# Patient Record
Sex: MALE | HISPANIC OR LATINO | Employment: PART TIME | URBAN - METROPOLITAN AREA
[De-identification: names, ages, dates, MRNs, and addresses within clinical notes are randomized per-mention and may not be internally consistent; named-entity substitution may affect disease eponyms.]

---

## 2018-01-01 ENCOUNTER — APPOINTMENT (OUTPATIENT)
Dept: RADIOLOGY | Facility: MEDICAL CENTER | Age: 78
DRG: 981 | End: 2018-01-01
Attending: INTERNAL MEDICINE
Payer: MEDICARE

## 2018-01-01 ENCOUNTER — RESOLUTE PROFESSIONAL BILLING HOSPITAL PROF FEE (OUTPATIENT)
Dept: HOSPITALIST | Facility: MEDICAL CENTER | Age: 78
End: 2018-01-01
Payer: MEDICARE

## 2018-01-01 ENCOUNTER — APPOINTMENT (OUTPATIENT)
Dept: RADIOLOGY | Facility: MEDICAL CENTER | Age: 78
DRG: 981 | End: 2018-01-01
Attending: EMERGENCY MEDICINE
Payer: MEDICARE

## 2018-01-01 ENCOUNTER — HOSPITAL ENCOUNTER (INPATIENT)
Facility: MEDICAL CENTER | Age: 78
LOS: 17 days | DRG: 981 | End: 2018-05-07
Attending: EMERGENCY MEDICINE | Admitting: INTERNAL MEDICINE
Payer: MEDICARE

## 2018-01-01 ENCOUNTER — APPOINTMENT (OUTPATIENT)
Dept: RADIOLOGY | Facility: MEDICAL CENTER | Age: 78
DRG: 981 | End: 2018-01-01
Attending: HOSPITALIST
Payer: MEDICARE

## 2018-01-01 VITALS
DIASTOLIC BLOOD PRESSURE: 69 MMHG | SYSTOLIC BLOOD PRESSURE: 146 MMHG | HEIGHT: 69 IN | HEART RATE: 77 BPM | WEIGHT: 181 LBS | BODY MASS INDEX: 26.81 KG/M2 | RESPIRATION RATE: 22 BRPM | OXYGEN SATURATION: 95 % | TEMPERATURE: 98.6 F

## 2018-01-01 DIAGNOSIS — J96.01 ACUTE RESPIRATORY FAILURE WITH HYPOXIA (HCC): ICD-10-CM

## 2018-01-01 DIAGNOSIS — T68.XXXA HYPOTHERMIA, INITIAL ENCOUNTER: ICD-10-CM

## 2018-01-01 DIAGNOSIS — E87.5 HYPERKALEMIA: ICD-10-CM

## 2018-01-01 DIAGNOSIS — R53.1 WEAKNESS: ICD-10-CM

## 2018-01-01 DIAGNOSIS — J96.01 ACUTE HYPOXEMIC RESPIRATORY FAILURE (HCC): ICD-10-CM

## 2018-01-01 DIAGNOSIS — N17.9 ACUTE KIDNEY INJURY (HCC): ICD-10-CM

## 2018-01-01 DIAGNOSIS — D64.9 ANEMIA, UNSPECIFIED TYPE: ICD-10-CM

## 2018-01-01 DIAGNOSIS — R13.10 DYSPHAGIA, UNSPECIFIED TYPE: ICD-10-CM

## 2018-01-01 DIAGNOSIS — G93.40 ENCEPHALOPATHY: ICD-10-CM

## 2018-01-01 LAB
ABO GROUP BLD: NORMAL
ACTION RANGE TRIGGERED IACRT: NO
ACTION RANGE TRIGGERED IACRT: YES
ACTION RANGE TRIGGERED IACRT: YES
ALBUMIN SERPL BCP-MCNC: 2.5 G/DL (ref 3.2–4.9)
ALBUMIN SERPL BCP-MCNC: 2.7 G/DL (ref 3.2–4.9)
ALBUMIN SERPL BCP-MCNC: 2.8 G/DL (ref 3.2–4.9)
ALBUMIN SERPL BCP-MCNC: 2.9 G/DL (ref 3.2–4.9)
ALBUMIN SERPL BCP-MCNC: 3 G/DL (ref 3.2–4.9)
ALBUMIN SERPL BCP-MCNC: 3.1 G/DL (ref 3.2–4.9)
ALBUMIN SERPL BCP-MCNC: 3.1 G/DL (ref 3.2–4.9)
ALBUMIN/GLOB SERPL: 0.8 G/DL
ALBUMIN/GLOB SERPL: 0.8 G/DL
ALBUMIN/GLOB SERPL: 0.9 G/DL
ALBUMIN/GLOB SERPL: 1 G/DL
ALBUMIN/GLOB SERPL: 1.1 G/DL
ALBUMIN/GLOB SERPL: 1.1 G/DL
ALBUMIN/GLOB SERPL: 1.2 G/DL
ALP SERPL-CCNC: 138 U/L (ref 30–99)
ALP SERPL-CCNC: 145 U/L (ref 30–99)
ALP SERPL-CCNC: 184 U/L (ref 30–99)
ALP SERPL-CCNC: 201 U/L (ref 30–99)
ALP SERPL-CCNC: 363 U/L (ref 30–99)
ALP SERPL-CCNC: 429 U/L (ref 30–99)
ALP SERPL-CCNC: 589 U/L (ref 30–99)
ALT SERPL-CCNC: 17 U/L (ref 2–50)
ALT SERPL-CCNC: 19 U/L (ref 2–50)
ALT SERPL-CCNC: 21 U/L (ref 2–50)
ALT SERPL-CCNC: 35 U/L (ref 2–50)
ALT SERPL-CCNC: 37 U/L (ref 2–50)
ALT SERPL-CCNC: 52 U/L (ref 2–50)
ALT SERPL-CCNC: 53 U/L (ref 2–50)
AMMONIA PLAS-SCNC: 29 UMOL/L (ref 11–45)
ANION GAP SERPL CALC-SCNC: 10 MMOL/L (ref 0–11.9)
ANION GAP SERPL CALC-SCNC: 11 MMOL/L (ref 0–11.9)
ANION GAP SERPL CALC-SCNC: 11 MMOL/L (ref 0–11.9)
ANION GAP SERPL CALC-SCNC: 12 MMOL/L (ref 0–11.9)
ANION GAP SERPL CALC-SCNC: 14 MMOL/L (ref 0–11.9)
ANION GAP SERPL CALC-SCNC: 6 MMOL/L (ref 0–11.9)
ANION GAP SERPL CALC-SCNC: 6 MMOL/L (ref 0–11.9)
ANION GAP SERPL CALC-SCNC: 7 MMOL/L (ref 0–11.9)
ANION GAP SERPL CALC-SCNC: 8 MMOL/L (ref 0–11.9)
ANION GAP SERPL CALC-SCNC: 9 MMOL/L (ref 0–11.9)
ANION GAP SERPL CALC-SCNC: 9 MMOL/L (ref 0–11.9)
APTT PPP: 214.5 SEC (ref 24.7–36)
APTT PPP: >240 SEC (ref 24.7–36)
AST SERPL-CCNC: 102 U/L (ref 12–45)
AST SERPL-CCNC: 23 U/L (ref 12–45)
AST SERPL-CCNC: 30 U/L (ref 12–45)
AST SERPL-CCNC: 34 U/L (ref 12–45)
AST SERPL-CCNC: 35 U/L (ref 12–45)
AST SERPL-CCNC: 35 U/L (ref 12–45)
AST SERPL-CCNC: 62 U/L (ref 12–45)
BACTERIA BLD CULT: NORMAL
BACTERIA BLD CULT: NORMAL
BACTERIA SPEC RESP CULT: ABNORMAL
BARCODED ABORH UBTYP: 7300
BARCODED PRD CODE UBPRD: NORMAL
BARCODED UNIT NUM UBUNT: NORMAL
BASE EXCESS BLDA CALC-SCNC: -10 MMOL/L (ref -4–3)
BASE EXCESS BLDA CALC-SCNC: -11 MMOL/L (ref -4–3)
BASE EXCESS BLDA CALC-SCNC: -11 MMOL/L (ref -4–3)
BASE EXCESS BLDA CALC-SCNC: -15 MMOL/L (ref -4–3)
BASE EXCESS BLDA CALC-SCNC: 1 MMOL/L (ref -4–3)
BASE EXCESS BLDA CALC-SCNC: 4 MMOL/L (ref -4–3)
BASE EXCESS BLDA CALC-SCNC: 6 MMOL/L (ref -4–3)
BASOPHILS # BLD AUTO: 0 % (ref 0–1.8)
BASOPHILS # BLD AUTO: 0.1 % (ref 0–1.8)
BASOPHILS # BLD AUTO: 0.2 % (ref 0–1.8)
BASOPHILS # BLD AUTO: 0.3 % (ref 0–1.8)
BASOPHILS # BLD AUTO: 0.4 % (ref 0–1.8)
BASOPHILS # BLD AUTO: 0.6 % (ref 0–1.8)
BASOPHILS # BLD AUTO: 0.8 % (ref 0–1.8)
BASOPHILS # BLD: 0 K/UL (ref 0–0.12)
BASOPHILS # BLD: 0.01 K/UL (ref 0–0.12)
BASOPHILS # BLD: 0.01 K/UL (ref 0–0.12)
BASOPHILS # BLD: 0.02 K/UL (ref 0–0.12)
BASOPHILS # BLD: 0.03 K/UL (ref 0–0.12)
BASOPHILS # BLD: 0.03 K/UL (ref 0–0.12)
BASOPHILS # BLD: 0.04 K/UL (ref 0–0.12)
BASOPHILS # BLD: 0.06 K/UL (ref 0–0.12)
BASOPHILS # BLD: 0.09 K/UL (ref 0–0.12)
BILIRUB SERPL-MCNC: 0.3 MG/DL (ref 0.1–1.5)
BILIRUB SERPL-MCNC: 0.4 MG/DL (ref 0.1–1.5)
BILIRUB SERPL-MCNC: 0.6 MG/DL (ref 0.1–1.5)
BILIRUB SERPL-MCNC: 0.7 MG/DL (ref 0.1–1.5)
BILIRUB SERPL-MCNC: 0.7 MG/DL (ref 0.1–1.5)
BLD GP AB SCN SERPL QL: NORMAL
BODY TEMPERATURE: ABNORMAL CENTIGRADE
BODY TEMPERATURE: ABNORMAL CENTIGRADE
BODY TEMPERATURE: ABNORMAL DEGREES
BUN SERPL-MCNC: 26 MG/DL (ref 8–22)
BUN SERPL-MCNC: 28 MG/DL (ref 8–22)
BUN SERPL-MCNC: 33 MG/DL (ref 8–22)
BUN SERPL-MCNC: 37 MG/DL (ref 8–22)
BUN SERPL-MCNC: 38 MG/DL (ref 8–22)
BUN SERPL-MCNC: 40 MG/DL (ref 8–22)
BUN SERPL-MCNC: 41 MG/DL (ref 8–22)
BUN SERPL-MCNC: 43 MG/DL (ref 8–22)
BUN SERPL-MCNC: 45 MG/DL (ref 8–22)
BUN SERPL-MCNC: 46 MG/DL (ref 8–22)
BUN SERPL-MCNC: 50 MG/DL (ref 8–22)
BUN SERPL-MCNC: 53 MG/DL (ref 8–22)
BUN SERPL-MCNC: 54 MG/DL (ref 8–22)
BUN SERPL-MCNC: 57 MG/DL (ref 8–22)
BUN SERPL-MCNC: 58 MG/DL (ref 8–22)
BUN SERPL-MCNC: 63 MG/DL (ref 8–22)
BUN SERPL-MCNC: 65 MG/DL (ref 8–22)
CALCIUM SERPL-MCNC: 7.8 MG/DL (ref 8.5–10.5)
CALCIUM SERPL-MCNC: 8 MG/DL (ref 8.5–10.5)
CALCIUM SERPL-MCNC: 8 MG/DL (ref 8.5–10.5)
CALCIUM SERPL-MCNC: 8.2 MG/DL (ref 8.5–10.5)
CALCIUM SERPL-MCNC: 8.4 MG/DL (ref 8.5–10.5)
CALCIUM SERPL-MCNC: 8.5 MG/DL (ref 8.5–10.5)
CALCIUM SERPL-MCNC: 8.5 MG/DL (ref 8.5–10.5)
CALCIUM SERPL-MCNC: 8.7 MG/DL (ref 8.5–10.5)
CALCIUM SERPL-MCNC: 8.7 MG/DL (ref 8.5–10.5)
CALCIUM SERPL-MCNC: 8.8 MG/DL (ref 8.5–10.5)
CALCIUM SERPL-MCNC: 9 MG/DL (ref 8.5–10.5)
CALCIUM SERPL-MCNC: 9.1 MG/DL (ref 8.5–10.5)
CFT BLD TEG: 33.2 MIN (ref 5–10)
CFT BLD TEG: 8.7 MIN (ref 5–10)
CFT P HPASE BLD TEG: 13.9 MIN (ref 5–10)
CHLORIDE SERPL-SCNC: 101 MMOL/L (ref 96–112)
CHLORIDE SERPL-SCNC: 103 MMOL/L (ref 96–112)
CHLORIDE SERPL-SCNC: 104 MMOL/L (ref 96–112)
CHLORIDE SERPL-SCNC: 105 MMOL/L (ref 96–112)
CHLORIDE SERPL-SCNC: 106 MMOL/L (ref 96–112)
CHLORIDE SERPL-SCNC: 106 MMOL/L (ref 96–112)
CHLORIDE SERPL-SCNC: 108 MMOL/L (ref 96–112)
CHLORIDE SERPL-SCNC: 108 MMOL/L (ref 96–112)
CHLORIDE SERPL-SCNC: 110 MMOL/L (ref 96–112)
CHLORIDE SERPL-SCNC: 110 MMOL/L (ref 96–112)
CHLORIDE SERPL-SCNC: 112 MMOL/L (ref 96–112)
CHLORIDE SERPL-SCNC: 97 MMOL/L (ref 96–112)
CHLORIDE SERPL-SCNC: 99 MMOL/L (ref 96–112)
CK SERPL-CCNC: 223 U/L (ref 0–154)
CLOT ANGLE BLD TEG: 28.8 DEGREES (ref 53–72)
CLOT ANGLE BLD TEG: 71.2 DEGREES (ref 53–72)
CLOT ANGLE P HPASE BLD TEG: 69.6 DEGREES (ref 53–72)
CLOT INIT P HPASE BLD TEG: 1.7 MIN (ref 1–3)
CLOT LYSIS 30M P MA LENFR BLD TEG: 0 % (ref 0–8)
CLOT LYSIS 30M P MA LENFR BLD TEG: 0 % (ref 0–8)
CLOT LYSIS 30M P MA LENFR BLD TEG: 3.2 % (ref 0–8)
CO2 BLDA-SCNC: 14 MMOL/L (ref 20–33)
CO2 BLDA-SCNC: 17 MMOL/L (ref 20–33)
CO2 BLDA-SCNC: 17 MMOL/L (ref 20–33)
CO2 BLDA-SCNC: 23 MMOL/L (ref 20–33)
CO2 BLDA-SCNC: 28 MMOL/L (ref 20–33)
CO2 BLDA-SCNC: 30 MMOL/L (ref 20–33)
CO2 BLDA-SCNC: 30 MMOL/L (ref 20–33)
CO2 SERPL-SCNC: 13 MMOL/L (ref 20–33)
CO2 SERPL-SCNC: 14 MMOL/L (ref 20–33)
CO2 SERPL-SCNC: 23 MMOL/L (ref 20–33)
CO2 SERPL-SCNC: 25 MMOL/L (ref 20–33)
CO2 SERPL-SCNC: 25 MMOL/L (ref 20–33)
CO2 SERPL-SCNC: 28 MMOL/L (ref 20–33)
CO2 SERPL-SCNC: 29 MMOL/L (ref 20–33)
CO2 SERPL-SCNC: 31 MMOL/L (ref 20–33)
CO2 SERPL-SCNC: 33 MMOL/L (ref 20–33)
COMPONENT FT 8504FT: NORMAL
COMPONENT FT 8504FT: NORMAL
COMPONENT R 8504R: NORMAL
COMPONENT R 8504R: NORMAL
CORTIS SERPL-MCNC: 60.4 UG/DL (ref 0–23)
CREAT SERPL-MCNC: 1.74 MG/DL (ref 0.5–1.4)
CREAT SERPL-MCNC: 1.76 MG/DL (ref 0.5–1.4)
CREAT SERPL-MCNC: 1.83 MG/DL (ref 0.5–1.4)
CREAT SERPL-MCNC: 2.03 MG/DL (ref 0.5–1.4)
CREAT SERPL-MCNC: 2.09 MG/DL (ref 0.5–1.4)
CREAT SERPL-MCNC: 2.1 MG/DL (ref 0.5–1.4)
CREAT SERPL-MCNC: 2.11 MG/DL (ref 0.5–1.4)
CREAT SERPL-MCNC: 2.26 MG/DL (ref 0.5–1.4)
CREAT SERPL-MCNC: 2.29 MG/DL (ref 0.5–1.4)
CREAT SERPL-MCNC: 2.38 MG/DL (ref 0.5–1.4)
CREAT SERPL-MCNC: 2.54 MG/DL (ref 0.5–1.4)
CREAT SERPL-MCNC: 2.6 MG/DL (ref 0.5–1.4)
CREAT SERPL-MCNC: 2.75 MG/DL (ref 0.5–1.4)
CREAT SERPL-MCNC: 2.78 MG/DL (ref 0.5–1.4)
CREAT SERPL-MCNC: 2.82 MG/DL (ref 0.5–1.4)
CREAT SERPL-MCNC: 2.88 MG/DL (ref 0.5–1.4)
CREAT SERPL-MCNC: 3.01 MG/DL (ref 0.5–1.4)
CREAT SERPL-MCNC: 3.18 MG/DL (ref 0.5–1.4)
CREAT SERPL-MCNC: 3.28 MG/DL (ref 0.5–1.4)
CREAT UR-MCNC: 83.1 MG/DL
CRP SERPL HS-MCNC: 14.98 MG/DL (ref 0–0.75)
CRP SERPL HS-MCNC: 8.3 MG/DL (ref 0–0.75)
CT.EXTRINSIC BLD ROTEM: 1.4 MIN (ref 1–3)
CT.EXTRINSIC BLD ROTEM: 9.9 MIN (ref 1–3)
EKG IMPRESSION: NORMAL
EKG IMPRESSION: NORMAL
EOSINOPHIL # BLD AUTO: 0 K/UL (ref 0–0.51)
EOSINOPHIL # BLD AUTO: 0 K/UL (ref 0–0.51)
EOSINOPHIL # BLD AUTO: 0.01 K/UL (ref 0–0.51)
EOSINOPHIL # BLD AUTO: 0.02 K/UL (ref 0–0.51)
EOSINOPHIL # BLD AUTO: 0.03 K/UL (ref 0–0.51)
EOSINOPHIL # BLD AUTO: 0.05 K/UL (ref 0–0.51)
EOSINOPHIL # BLD AUTO: 0.09 K/UL (ref 0–0.51)
EOSINOPHIL # BLD AUTO: 0.09 K/UL (ref 0–0.51)
EOSINOPHIL # BLD AUTO: 0.14 K/UL (ref 0–0.51)
EOSINOPHIL # BLD AUTO: 0.19 K/UL (ref 0–0.51)
EOSINOPHIL # BLD AUTO: 0.24 K/UL (ref 0–0.51)
EOSINOPHIL NFR BLD: 0 % (ref 0–6.9)
EOSINOPHIL NFR BLD: 0 % (ref 0–6.9)
EOSINOPHIL NFR BLD: 0.1 % (ref 0–6.9)
EOSINOPHIL NFR BLD: 0.2 % (ref 0–6.9)
EOSINOPHIL NFR BLD: 0.2 % (ref 0–6.9)
EOSINOPHIL NFR BLD: 0.5 % (ref 0–6.9)
EOSINOPHIL NFR BLD: 0.8 % (ref 0–6.9)
EOSINOPHIL NFR BLD: 1.1 % (ref 0–6.9)
EOSINOPHIL NFR BLD: 1.6 % (ref 0–6.9)
EOSINOPHIL NFR BLD: 1.8 % (ref 0–6.9)
EOSINOPHIL NFR BLD: 2.1 % (ref 0–6.9)
ERYTHROCYTE [DISTWIDTH] IN BLOOD BY AUTOMATED COUNT: 54.9 FL (ref 35.9–50)
ERYTHROCYTE [DISTWIDTH] IN BLOOD BY AUTOMATED COUNT: 55.4 FL (ref 35.9–50)
ERYTHROCYTE [DISTWIDTH] IN BLOOD BY AUTOMATED COUNT: 55.4 FL (ref 35.9–50)
ERYTHROCYTE [DISTWIDTH] IN BLOOD BY AUTOMATED COUNT: 55.5 FL (ref 35.9–50)
ERYTHROCYTE [DISTWIDTH] IN BLOOD BY AUTOMATED COUNT: 56.7 FL (ref 35.9–50)
ERYTHROCYTE [DISTWIDTH] IN BLOOD BY AUTOMATED COUNT: 57.1 FL (ref 35.9–50)
ERYTHROCYTE [DISTWIDTH] IN BLOOD BY AUTOMATED COUNT: 57.9 FL (ref 35.9–50)
ERYTHROCYTE [DISTWIDTH] IN BLOOD BY AUTOMATED COUNT: 58 FL (ref 35.9–50)
ERYTHROCYTE [DISTWIDTH] IN BLOOD BY AUTOMATED COUNT: 59 FL (ref 35.9–50)
ERYTHROCYTE [DISTWIDTH] IN BLOOD BY AUTOMATED COUNT: 59.7 FL (ref 35.9–50)
ERYTHROCYTE [DISTWIDTH] IN BLOOD BY AUTOMATED COUNT: 60.3 FL (ref 35.9–50)
ERYTHROCYTE [DISTWIDTH] IN BLOOD BY AUTOMATED COUNT: 60.7 FL (ref 35.9–50)
ERYTHROCYTE [DISTWIDTH] IN BLOOD BY AUTOMATED COUNT: 61.5 FL (ref 35.9–50)
ERYTHROCYTE [DISTWIDTH] IN BLOOD BY AUTOMATED COUNT: 61.6 FL (ref 35.9–50)
ERYTHROCYTE [DISTWIDTH] IN BLOOD BY AUTOMATED COUNT: 61.9 FL (ref 35.9–50)
EST. AVERAGE GLUCOSE BLD GHB EST-MCNC: 151 MG/DL
FERRITIN SERPL-MCNC: 127.3 NG/ML (ref 22–322)
FOLATE SERPL-MCNC: >23.5 NG/ML
GGT SERPL-CCNC: 494 U/L (ref 7–51)
GLOBULIN SER CALC-MCNC: 2.1 G/DL (ref 1.9–3.5)
GLOBULIN SER CALC-MCNC: 2.7 G/DL (ref 1.9–3.5)
GLOBULIN SER CALC-MCNC: 2.7 G/DL (ref 1.9–3.5)
GLOBULIN SER CALC-MCNC: 3.1 G/DL (ref 1.9–3.5)
GLOBULIN SER CALC-MCNC: 3.2 G/DL (ref 1.9–3.5)
GLOBULIN SER CALC-MCNC: 3.2 G/DL (ref 1.9–3.5)
GLOBULIN SER CALC-MCNC: 3.7 G/DL (ref 1.9–3.5)
GLUCOSE BLD-MCNC: 100 MG/DL (ref 65–99)
GLUCOSE BLD-MCNC: 103 MG/DL (ref 65–99)
GLUCOSE BLD-MCNC: 109 MG/DL (ref 65–99)
GLUCOSE BLD-MCNC: 110 MG/DL (ref 65–99)
GLUCOSE BLD-MCNC: 113 MG/DL (ref 65–99)
GLUCOSE BLD-MCNC: 118 MG/DL (ref 65–99)
GLUCOSE BLD-MCNC: 119 MG/DL (ref 65–99)
GLUCOSE BLD-MCNC: 120 MG/DL (ref 65–99)
GLUCOSE BLD-MCNC: 120 MG/DL (ref 65–99)
GLUCOSE BLD-MCNC: 121 MG/DL (ref 65–99)
GLUCOSE BLD-MCNC: 121 MG/DL (ref 65–99)
GLUCOSE BLD-MCNC: 124 MG/DL (ref 65–99)
GLUCOSE BLD-MCNC: 126 MG/DL (ref 65–99)
GLUCOSE BLD-MCNC: 127 MG/DL (ref 65–99)
GLUCOSE BLD-MCNC: 128 MG/DL (ref 65–99)
GLUCOSE BLD-MCNC: 130 MG/DL (ref 65–99)
GLUCOSE BLD-MCNC: 130 MG/DL (ref 65–99)
GLUCOSE BLD-MCNC: 131 MG/DL (ref 65–99)
GLUCOSE BLD-MCNC: 132 MG/DL (ref 65–99)
GLUCOSE BLD-MCNC: 136 MG/DL (ref 65–99)
GLUCOSE BLD-MCNC: 137 MG/DL (ref 65–99)
GLUCOSE BLD-MCNC: 139 MG/DL (ref 65–99)
GLUCOSE BLD-MCNC: 147 MG/DL (ref 65–99)
GLUCOSE BLD-MCNC: 149 MG/DL (ref 65–99)
GLUCOSE BLD-MCNC: 149 MG/DL (ref 65–99)
GLUCOSE BLD-MCNC: 151 MG/DL (ref 65–99)
GLUCOSE BLD-MCNC: 153 MG/DL (ref 65–99)
GLUCOSE BLD-MCNC: 157 MG/DL (ref 65–99)
GLUCOSE BLD-MCNC: 158 MG/DL (ref 65–99)
GLUCOSE BLD-MCNC: 158 MG/DL (ref 65–99)
GLUCOSE BLD-MCNC: 166 MG/DL (ref 65–99)
GLUCOSE BLD-MCNC: 168 MG/DL (ref 65–99)
GLUCOSE BLD-MCNC: 170 MG/DL (ref 65–99)
GLUCOSE BLD-MCNC: 171 MG/DL (ref 65–99)
GLUCOSE BLD-MCNC: 175 MG/DL (ref 65–99)
GLUCOSE BLD-MCNC: 177 MG/DL (ref 65–99)
GLUCOSE BLD-MCNC: 178 MG/DL (ref 65–99)
GLUCOSE BLD-MCNC: 181 MG/DL (ref 65–99)
GLUCOSE BLD-MCNC: 183 MG/DL (ref 65–99)
GLUCOSE BLD-MCNC: 184 MG/DL (ref 65–99)
GLUCOSE BLD-MCNC: 191 MG/DL (ref 65–99)
GLUCOSE BLD-MCNC: 196 MG/DL (ref 65–99)
GLUCOSE BLD-MCNC: 199 MG/DL (ref 65–99)
GLUCOSE BLD-MCNC: 200 MG/DL (ref 65–99)
GLUCOSE BLD-MCNC: 204 MG/DL (ref 65–99)
GLUCOSE BLD-MCNC: 218 MG/DL (ref 65–99)
GLUCOSE BLD-MCNC: 223 MG/DL (ref 65–99)
GLUCOSE BLD-MCNC: 223 MG/DL (ref 65–99)
GLUCOSE BLD-MCNC: 229 MG/DL (ref 65–99)
GLUCOSE BLD-MCNC: 240 MG/DL (ref 65–99)
GLUCOSE BLD-MCNC: 251 MG/DL (ref 65–99)
GLUCOSE BLD-MCNC: 254 MG/DL (ref 65–99)
GLUCOSE BLD-MCNC: 293 MG/DL (ref 65–99)
GLUCOSE BLD-MCNC: 306 MG/DL (ref 65–99)
GLUCOSE BLD-MCNC: 306 MG/DL (ref 65–99)
GLUCOSE BLD-MCNC: 53 MG/DL (ref 65–99)
GLUCOSE BLD-MCNC: 81 MG/DL (ref 65–99)
GLUCOSE BLD-MCNC: 88 MG/DL (ref 65–99)
GLUCOSE BLD-MCNC: 91 MG/DL (ref 65–99)
GLUCOSE BLD-MCNC: 92 MG/DL (ref 65–99)
GLUCOSE SERPL-MCNC: 109 MG/DL (ref 65–99)
GLUCOSE SERPL-MCNC: 110 MG/DL (ref 65–99)
GLUCOSE SERPL-MCNC: 113 MG/DL (ref 65–99)
GLUCOSE SERPL-MCNC: 125 MG/DL (ref 65–99)
GLUCOSE SERPL-MCNC: 130 MG/DL (ref 65–99)
GLUCOSE SERPL-MCNC: 136 MG/DL (ref 65–99)
GLUCOSE SERPL-MCNC: 137 MG/DL (ref 65–99)
GLUCOSE SERPL-MCNC: 138 MG/DL (ref 65–99)
GLUCOSE SERPL-MCNC: 155 MG/DL (ref 65–99)
GLUCOSE SERPL-MCNC: 158 MG/DL (ref 65–99)
GLUCOSE SERPL-MCNC: 171 MG/DL (ref 65–99)
GLUCOSE SERPL-MCNC: 177 MG/DL (ref 65–99)
GLUCOSE SERPL-MCNC: 177 MG/DL (ref 65–99)
GLUCOSE SERPL-MCNC: 189 MG/DL (ref 65–99)
GLUCOSE SERPL-MCNC: 218 MG/DL (ref 65–99)
GLUCOSE SERPL-MCNC: 235 MG/DL (ref 65–99)
GLUCOSE SERPL-MCNC: 300 MG/DL (ref 65–99)
GLUCOSE SERPL-MCNC: 60 MG/DL (ref 65–99)
GLUCOSE SERPL-MCNC: 84 MG/DL (ref 65–99)
GRAM STN SPEC: ABNORMAL
GRAM STN SPEC: NORMAL
HAPTOGLOB SERPL-MCNC: 171 MG/DL (ref 30–200)
HAV IGM SERPL QL IA: NEGATIVE
HBA1C MFR BLD: 6.9 % (ref 0–5.6)
HBV CORE IGM SER QL: NEGATIVE
HBV SURFACE AB SERPL IA-ACNC: 22.23 MIU/ML (ref 0–10)
HBV SURFACE AG SER QL: NEGATIVE
HCO3 BLDA-SCNC: 13 MMOL/L (ref 17–25)
HCO3 BLDA-SCNC: 13.5 MMOL/L (ref 17–25)
HCO3 BLDA-SCNC: 15.8 MMOL/L (ref 17–25)
HCO3 BLDA-SCNC: 15.8 MMOL/L (ref 17–25)
HCO3 BLDA-SCNC: 22.5 MMOL/L (ref 17–25)
HCO3 BLDA-SCNC: 26.9 MMOL/L (ref 17–25)
HCO3 BLDA-SCNC: 29 MMOL/L (ref 17–25)
HCO3 BLDA-SCNC: 29.4 MMOL/L (ref 17–25)
HCO3 BLDA-SCNC: 31 MMOL/L (ref 17–25)
HCT VFR BLD AUTO: 17.4 % (ref 42–52)
HCT VFR BLD AUTO: 21 % (ref 42–52)
HCT VFR BLD AUTO: 21.6 % (ref 42–52)
HCT VFR BLD AUTO: 22 % (ref 42–52)
HCT VFR BLD AUTO: 22.3 % (ref 42–52)
HCT VFR BLD AUTO: 22.3 % (ref 42–52)
HCT VFR BLD AUTO: 22.7 % (ref 42–52)
HCT VFR BLD AUTO: 22.8 % (ref 42–52)
HCT VFR BLD AUTO: 23.5 % (ref 42–52)
HCT VFR BLD AUTO: 23.6 % (ref 42–52)
HCT VFR BLD AUTO: 23.9 % (ref 42–52)
HCT VFR BLD AUTO: 24 % (ref 42–52)
HCT VFR BLD AUTO: 24.1 % (ref 42–52)
HCT VFR BLD AUTO: 24.5 % (ref 42–52)
HCT VFR BLD AUTO: 25.3 % (ref 42–52)
HCT VFR BLD AUTO: 25.3 % (ref 42–52)
HCT VFR BLD AUTO: 25.6 % (ref 42–52)
HCT VFR BLD AUTO: 26.1 % (ref 42–52)
HCT VFR BLD AUTO: 27.2 % (ref 42–52)
HCT VFR BLD AUTO: 28.3 % (ref 42–52)
HCT VFR BLD AUTO: 31.8 % (ref 42–52)
HCV AB SER QL: NEGATIVE
HGB BLD-MCNC: 5.7 G/DL (ref 14–18)
HGB BLD-MCNC: 7.2 G/DL (ref 14–18)
HGB BLD-MCNC: 7.3 G/DL (ref 14–18)
HGB BLD-MCNC: 7.4 G/DL (ref 14–18)
HGB BLD-MCNC: 7.5 G/DL (ref 14–18)
HGB BLD-MCNC: 7.7 G/DL (ref 14–18)
HGB BLD-MCNC: 7.8 G/DL (ref 14–18)
HGB BLD-MCNC: 7.9 G/DL (ref 14–18)
HGB BLD-MCNC: 8.1 G/DL (ref 14–18)
HGB BLD-MCNC: 8.3 G/DL (ref 14–18)
HGB BLD-MCNC: 8.5 G/DL (ref 14–18)
HGB BLD-MCNC: 8.7 G/DL (ref 14–18)
HGB BLD-MCNC: 8.8 G/DL (ref 14–18)
HGB BLD-MCNC: 9 G/DL (ref 14–18)
HGB BLD-MCNC: 9.8 G/DL (ref 14–18)
HGB RETIC QN AUTO: 31.3 PG/CELL (ref 29–35)
IMM GRANULOCYTES # BLD AUTO: 0.01 K/UL (ref 0–0.11)
IMM GRANULOCYTES # BLD AUTO: 0.01 K/UL (ref 0–0.11)
IMM GRANULOCYTES # BLD AUTO: 0.04 K/UL (ref 0–0.11)
IMM GRANULOCYTES # BLD AUTO: 0.05 K/UL (ref 0–0.11)
IMM GRANULOCYTES # BLD AUTO: 0.07 K/UL (ref 0–0.11)
IMM GRANULOCYTES # BLD AUTO: 0.09 K/UL (ref 0–0.11)
IMM GRANULOCYTES # BLD AUTO: 0.1 K/UL (ref 0–0.11)
IMM GRANULOCYTES NFR BLD AUTO: 0.3 % (ref 0–0.9)
IMM GRANULOCYTES NFR BLD AUTO: 0.4 % (ref 0–0.9)
IMM GRANULOCYTES NFR BLD AUTO: 0.5 % (ref 0–0.9)
IMM GRANULOCYTES NFR BLD AUTO: 0.5 % (ref 0–0.9)
IMM GRANULOCYTES NFR BLD AUTO: 0.7 % (ref 0–0.9)
IMM GRANULOCYTES NFR BLD AUTO: 0.8 % (ref 0–0.9)
IMM GRANULOCYTES NFR BLD AUTO: 0.8 % (ref 0–0.9)
IMM GRANULOCYTES NFR BLD AUTO: 0.9 % (ref 0–0.9)
IMM GRANULOCYTES NFR BLD AUTO: 0.9 % (ref 0–0.9)
IMM GRANULOCYTES NFR BLD AUTO: 1 % (ref 0–0.9)
IMM GRANULOCYTES NFR BLD AUTO: 1.2 % (ref 0–0.9)
IMM RETICS NFR: 24.8 % (ref 9.3–17.4)
INHALED O2 FLOW RATE: 3.5 L/MIN (ref 2–10)
INR PPP: 1.07 (ref 0.87–1.13)
INR PPP: 2.34 (ref 0.87–1.13)
INST. QUALIFIED PATIENT IIQPT: YES
IRON SATN MFR SERPL: 11 % (ref 15–55)
IRON SERPL-MCNC: 36 UG/DL (ref 50–180)
LACTATE BLD-SCNC: 1.2 MMOL/L (ref 0.5–2)
LACTATE BLD-SCNC: 1.9 MMOL/L (ref 0.5–2)
LDH SERPL L TO P-CCNC: 174 U/L (ref 107–266)
LDH SERPL L TO P-CCNC: 291 U/L (ref 107–266)
LIPASE SERPL-CCNC: 237 U/L (ref 11–82)
LV EJECT FRACT  99904: 65
LV EJECT FRACT MOD 2C 99903: 68.83
LV EJECT FRACT MOD 4C 99902: 78.63
LV EJECT FRACT MOD BP 99901: 73.13
LYMPHOCYTES # BLD AUTO: 0.35 K/UL (ref 1–4.8)
LYMPHOCYTES # BLD AUTO: 0.38 K/UL (ref 1–4.8)
LYMPHOCYTES # BLD AUTO: 0.48 K/UL (ref 1–4.8)
LYMPHOCYTES # BLD AUTO: 0.54 K/UL (ref 1–4.8)
LYMPHOCYTES # BLD AUTO: 0.55 K/UL (ref 1–4.8)
LYMPHOCYTES # BLD AUTO: 0.95 K/UL (ref 1–4.8)
LYMPHOCYTES # BLD AUTO: 0.95 K/UL (ref 1–4.8)
LYMPHOCYTES # BLD AUTO: 1.03 K/UL (ref 1–4.8)
LYMPHOCYTES # BLD AUTO: 1.12 K/UL (ref 1–4.8)
LYMPHOCYTES # BLD AUTO: 1.19 K/UL (ref 1–4.8)
LYMPHOCYTES # BLD AUTO: 1.19 K/UL (ref 1–4.8)
LYMPHOCYTES # BLD AUTO: 1.29 K/UL (ref 1–4.8)
LYMPHOCYTES # BLD AUTO: 1.4 K/UL (ref 1–4.8)
LYMPHOCYTES NFR BLD: 10.3 % (ref 22–41)
LYMPHOCYTES NFR BLD: 10.6 % (ref 22–41)
LYMPHOCYTES NFR BLD: 11.2 % (ref 22–41)
LYMPHOCYTES NFR BLD: 11.6 % (ref 22–41)
LYMPHOCYTES NFR BLD: 11.7 % (ref 22–41)
LYMPHOCYTES NFR BLD: 12.2 % (ref 22–41)
LYMPHOCYTES NFR BLD: 12.5 % (ref 22–41)
LYMPHOCYTES NFR BLD: 12.6 % (ref 22–41)
LYMPHOCYTES NFR BLD: 13.3 % (ref 22–41)
LYMPHOCYTES NFR BLD: 14 % (ref 22–41)
LYMPHOCYTES NFR BLD: 3.6 % (ref 22–41)
LYMPHOCYTES NFR BLD: 5.9 % (ref 22–41)
LYMPHOCYTES NFR BLD: 5.9 % (ref 22–41)
MAGNESIUM SERPL-MCNC: 2 MG/DL (ref 1.5–2.5)
MAGNESIUM SERPL-MCNC: 2 MG/DL (ref 1.5–2.5)
MAGNESIUM SERPL-MCNC: 2.1 MG/DL (ref 1.5–2.5)
MAGNESIUM SERPL-MCNC: 2.4 MG/DL (ref 1.5–2.5)
MAGNESIUM SERPL-MCNC: 2.6 MG/DL (ref 1.5–2.5)
MAGNESIUM SERPL-MCNC: 2.7 MG/DL (ref 1.5–2.5)
MCF BLD TEG: 76.5 MM (ref 50–70)
MCF BLD TEG: 76.7 MM (ref 50–70)
MCF P HPASE BLD TEG: 77.1 MM (ref 50–70)
MCH RBC QN AUTO: 30.3 PG (ref 27–33)
MCH RBC QN AUTO: 30.6 PG (ref 27–33)
MCH RBC QN AUTO: 30.8 PG (ref 27–33)
MCH RBC QN AUTO: 30.8 PG (ref 27–33)
MCH RBC QN AUTO: 31.2 PG (ref 27–33)
MCH RBC QN AUTO: 31.3 PG (ref 27–33)
MCH RBC QN AUTO: 31.8 PG (ref 27–33)
MCH RBC QN AUTO: 31.9 PG (ref 27–33)
MCH RBC QN AUTO: 32 PG (ref 27–33)
MCH RBC QN AUTO: 32.5 PG (ref 27–33)
MCH RBC QN AUTO: 33.3 PG (ref 27–33)
MCH RBC QN AUTO: 38.4 PG (ref 27–33)
MCHC RBC AUTO-ENTMCNC: 30.4 G/DL (ref 33.7–35.3)
MCHC RBC AUTO-ENTMCNC: 30.4 G/DL (ref 33.7–35.3)
MCHC RBC AUTO-ENTMCNC: 30.8 G/DL (ref 33.7–35.3)
MCHC RBC AUTO-ENTMCNC: 31.3 G/DL (ref 33.7–35.3)
MCHC RBC AUTO-ENTMCNC: 31.6 G/DL (ref 33.7–35.3)
MCHC RBC AUTO-ENTMCNC: 31.8 G/DL (ref 33.7–35.3)
MCHC RBC AUTO-ENTMCNC: 32.1 G/DL (ref 33.7–35.3)
MCHC RBC AUTO-ENTMCNC: 32.2 G/DL (ref 33.7–35.3)
MCHC RBC AUTO-ENTMCNC: 32.4 G/DL (ref 33.7–35.3)
MCHC RBC AUTO-ENTMCNC: 32.7 G/DL (ref 33.7–35.3)
MCHC RBC AUTO-ENTMCNC: 33.1 G/DL (ref 33.7–35.3)
MCHC RBC AUTO-ENTMCNC: 33.8 G/DL (ref 33.7–35.3)
MCHC RBC AUTO-ENTMCNC: 34.4 G/DL (ref 33.7–35.3)
MCHC RBC AUTO-ENTMCNC: 34.7 G/DL (ref 33.7–35.3)
MCHC RBC AUTO-ENTMCNC: 35.3 G/DL (ref 33.7–35.3)
MCV RBC AUTO: 100 FL (ref 81.4–97.8)
MCV RBC AUTO: 100.4 FL (ref 81.4–97.8)
MCV RBC AUTO: 101.3 FL (ref 81.4–97.8)
MCV RBC AUTO: 102.4 FL (ref 81.4–97.8)
MCV RBC AUTO: 105 FL (ref 81.4–97.8)
MCV RBC AUTO: 108.2 FL (ref 81.4–97.8)
MCV RBC AUTO: 108.8 FL (ref 81.4–97.8)
MCV RBC AUTO: 90.9 FL (ref 81.4–97.8)
MCV RBC AUTO: 91.1 FL (ref 81.4–97.8)
MCV RBC AUTO: 91.6 FL (ref 81.4–97.8)
MCV RBC AUTO: 93.3 FL (ref 81.4–97.8)
MCV RBC AUTO: 94.5 FL (ref 81.4–97.8)
MCV RBC AUTO: 95.7 FL (ref 81.4–97.8)
MCV RBC AUTO: 95.8 FL (ref 81.4–97.8)
MCV RBC AUTO: 97.2 FL (ref 81.4–97.8)
MONOCYTES # BLD AUTO: 0.11 K/UL (ref 0–0.85)
MONOCYTES # BLD AUTO: 0.35 K/UL (ref 0–0.85)
MONOCYTES # BLD AUTO: 0.36 K/UL (ref 0–0.85)
MONOCYTES # BLD AUTO: 0.49 K/UL (ref 0–0.85)
MONOCYTES # BLD AUTO: 0.51 K/UL (ref 0–0.85)
MONOCYTES # BLD AUTO: 0.52 K/UL (ref 0–0.85)
MONOCYTES # BLD AUTO: 0.52 K/UL (ref 0–0.85)
MONOCYTES # BLD AUTO: 0.56 K/UL (ref 0–0.85)
MONOCYTES # BLD AUTO: 0.58 K/UL (ref 0–0.85)
MONOCYTES # BLD AUTO: 0.59 K/UL (ref 0–0.85)
MONOCYTES # BLD AUTO: 0.6 K/UL (ref 0–0.85)
MONOCYTES # BLD AUTO: 0.61 K/UL (ref 0–0.85)
MONOCYTES # BLD AUTO: 0.69 K/UL (ref 0–0.85)
MONOCYTES NFR BLD AUTO: 4.2 % (ref 0–13.4)
MONOCYTES NFR BLD AUTO: 4.3 % (ref 0–13.4)
MONOCYTES NFR BLD AUTO: 4.8 % (ref 0–13.4)
MONOCYTES NFR BLD AUTO: 5.2 % (ref 0–13.4)
MONOCYTES NFR BLD AUTO: 5.4 % (ref 0–13.4)
MONOCYTES NFR BLD AUTO: 5.5 % (ref 0–13.4)
MONOCYTES NFR BLD AUTO: 5.7 % (ref 0–13.4)
MONOCYTES NFR BLD AUTO: 5.9 % (ref 0–13.4)
MONOCYTES NFR BLD AUTO: 6 % (ref 0–13.4)
MONOCYTES NFR BLD AUTO: 6.1 % (ref 0–13.4)
MONOCYTES NFR BLD AUTO: 6.4 % (ref 0–13.4)
MONOCYTES NFR BLD AUTO: 6.9 % (ref 0–13.4)
MONOCYTES NFR BLD AUTO: 9.2 % (ref 0–13.4)
NEUTROPHILS # BLD AUTO: 2.14 K/UL (ref 1.82–7.42)
NEUTROPHILS # BLD AUTO: 2.96 K/UL (ref 1.82–7.42)
NEUTROPHILS # BLD AUTO: 6.51 K/UL (ref 1.82–7.42)
NEUTROPHILS # BLD AUTO: 6.73 K/UL (ref 1.82–7.42)
NEUTROPHILS # BLD AUTO: 6.74 K/UL (ref 1.82–7.42)
NEUTROPHILS # BLD AUTO: 7.2 K/UL (ref 1.82–7.42)
NEUTROPHILS # BLD AUTO: 7.51 K/UL (ref 1.82–7.42)
NEUTROPHILS # BLD AUTO: 7.97 K/UL (ref 1.82–7.42)
NEUTROPHILS # BLD AUTO: 8.55 K/UL (ref 1.82–7.42)
NEUTROPHILS # BLD AUTO: 8.86 K/UL (ref 1.82–7.42)
NEUTROPHILS # BLD AUTO: 9.03 K/UL (ref 1.82–7.42)
NEUTROPHILS # BLD AUTO: 9.51 K/UL (ref 1.82–7.42)
NEUTROPHILS # BLD AUTO: 9.71 K/UL (ref 1.82–7.42)
NEUTROPHILS NFR BLD: 75.4 % (ref 44–72)
NEUTROPHILS NFR BLD: 79.1 % (ref 44–72)
NEUTROPHILS NFR BLD: 79.1 % (ref 44–72)
NEUTROPHILS NFR BLD: 79.6 % (ref 44–72)
NEUTROPHILS NFR BLD: 79.6 % (ref 44–72)
NEUTROPHILS NFR BLD: 80.1 % (ref 44–72)
NEUTROPHILS NFR BLD: 80.6 % (ref 44–72)
NEUTROPHILS NFR BLD: 80.9 % (ref 44–72)
NEUTROPHILS NFR BLD: 81.3 % (ref 44–72)
NEUTROPHILS NFR BLD: 82.5 % (ref 44–72)
NEUTROPHILS NFR BLD: 87.9 % (ref 44–72)
NEUTROPHILS NFR BLD: 89.1 % (ref 44–72)
NEUTROPHILS NFR BLD: 91 % (ref 44–72)
NRBC # BLD AUTO: 0 K/UL
NRBC # BLD AUTO: 0.02 K/UL
NRBC # BLD AUTO: 0.03 K/UL
NRBC # BLD AUTO: 0.04 K/UL
NRBC # BLD AUTO: 0.05 K/UL
NRBC # BLD AUTO: 0.11 K/UL
NRBC BLD-RTO: 0 /100 WBC
NRBC BLD-RTO: 0.2 /100 WBC
NRBC BLD-RTO: 0.3 /100 WBC
NRBC BLD-RTO: 0.5 /100 WBC
NRBC BLD-RTO: 0.5 /100 WBC
NRBC BLD-RTO: 0.6 /100 WBC
NRBC BLD-RTO: 1 /100 WBC
O2/TOTAL GAS SETTING VFR VENT: 30 %
O2/TOTAL GAS SETTING VFR VENT: 30 %
O2/TOTAL GAS SETTING VFR VENT: 40 %
O2/TOTAL GAS SETTING VFR VENT: 80 %
O2/TOTAL GAS SETTING VFR VENT: 80 %
PA AA BLD-ACNC: 40.9 %
PA AA BLD-ACNC: 45.3 %
PA ADP BLD-ACNC: 27.5 %
PA ADP BLD-ACNC: 34.9 %
PCO2 BLDA: 24.8 MMHG (ref 26–37)
PCO2 BLDA: 24.9 MMHG (ref 26–37)
PCO2 BLDA: 32.1 MMHG (ref 26–37)
PCO2 BLDA: 34.3 MMHG (ref 26–37)
PCO2 BLDA: 34.8 MMHG (ref 26–37)
PCO2 BLDA: 34.9 MMHG (ref 26–37)
PCO2 BLDA: 38.6 MMHG (ref 26–37)
PCO2 BLDA: 40.2 MMHG (ref 26–37)
PCO2 BLDA: 44.2 MMHG (ref 26–37)
PCO2 TEMP ADJ BLDA: 23.1 MMHG (ref 26–37)
PCO2 TEMP ADJ BLDA: 25 MMHG (ref 26–37)
PCO2 TEMP ADJ BLDA: 28.8 MMHG (ref 26–37)
PCO2 TEMP ADJ BLDA: 32 MMHG (ref 26–37)
PCO2 TEMP ADJ BLDA: 32.9 MMHG (ref 26–37)
PCO2 TEMP ADJ BLDA: 33.4 MMHG (ref 26–37)
PCO2 TEMP ADJ BLDA: 34.6 MMHG (ref 26–37)
PH BLDA: 7.14 [PH] (ref 7.4–7.5)
PH BLDA: 7.2 [PH] (ref 7.4–7.5)
PH BLDA: 7.26 [PH] (ref 7.4–7.5)
PH BLDA: 7.34 [PH] (ref 7.4–7.5)
PH BLDA: 7.46 [PH] (ref 7.4–7.5)
PH BLDA: 7.53 [PH] (ref 7.4–7.5)
PH BLDA: 7.53 [PH] (ref 7.4–7.5)
PH BLDA: 7.54 [PH] (ref 7.4–7.5)
PH BLDA: 7.56 [PH] (ref 7.4–7.5)
PH TEMP ADJ BLDA: 7.26 [PH] (ref 7.4–7.5)
PH TEMP ADJ BLDA: 7.33 [PH] (ref 7.4–7.5)
PH TEMP ADJ BLDA: 7.37 [PH] (ref 7.4–7.5)
PH TEMP ADJ BLDA: 7.53 [PH] (ref 7.4–7.5)
PH TEMP ADJ BLDA: 7.54 [PH] (ref 7.4–7.5)
PH TEMP ADJ BLDA: 7.54 [PH] (ref 7.4–7.5)
PH TEMP ADJ BLDA: 7.56 [PH] (ref 7.4–7.5)
PHOSPHATE SERPL-MCNC: 3.7 MG/DL (ref 2.5–4.5)
PHOSPHATE SERPL-MCNC: 4.2 MG/DL (ref 2.5–4.5)
PHOSPHATE SERPL-MCNC: 5.6 MG/DL (ref 2.5–4.5)
PHOSPHATE SERPL-MCNC: 5.9 MG/DL (ref 2.5–4.5)
PHOSPHATE SERPL-MCNC: 7.2 MG/DL (ref 2.5–4.5)
PLATELET # BLD AUTO: 162 K/UL (ref 164–446)
PLATELET # BLD AUTO: 170 K/UL (ref 164–446)
PLATELET # BLD AUTO: 184 K/UL (ref 164–446)
PLATELET # BLD AUTO: 196 K/UL (ref 164–446)
PLATELET # BLD AUTO: 207 K/UL (ref 164–446)
PLATELET # BLD AUTO: 234 K/UL (ref 164–446)
PLATELET # BLD AUTO: 239 K/UL (ref 164–446)
PLATELET # BLD AUTO: 267 K/UL (ref 164–446)
PLATELET # BLD AUTO: 268 K/UL (ref 164–446)
PLATELET # BLD AUTO: 287 K/UL (ref 164–446)
PLATELET # BLD AUTO: 343 K/UL (ref 164–446)
PLATELET # BLD AUTO: 354 K/UL (ref 164–446)
PLATELET # BLD AUTO: 377 K/UL (ref 164–446)
PLATELET # BLD AUTO: 419 K/UL (ref 164–446)
PLATELET # BLD AUTO: 437 K/UL (ref 164–446)
PMV BLD AUTO: 10 FL (ref 9–12.9)
PMV BLD AUTO: 10 FL (ref 9–12.9)
PMV BLD AUTO: 10.1 FL (ref 9–12.9)
PMV BLD AUTO: 10.2 FL (ref 9–12.9)
PMV BLD AUTO: 10.2 FL (ref 9–12.9)
PMV BLD AUTO: 10.3 FL (ref 9–12.9)
PMV BLD AUTO: 10.5 FL (ref 9–12.9)
PMV BLD AUTO: 10.5 FL (ref 9–12.9)
PMV BLD AUTO: 10.6 FL (ref 9–12.9)
PMV BLD AUTO: 10.7 FL (ref 9–12.9)
PMV BLD AUTO: 10.8 FL (ref 9–12.9)
PMV BLD AUTO: 11.1 FL (ref 9–12.9)
PMV BLD AUTO: 9.5 FL (ref 9–12.9)
PMV BLD AUTO: 9.9 FL (ref 9–12.9)
PMV BLD AUTO: 9.9 FL (ref 9–12.9)
PO2 BLDA: 111 MMHG (ref 64–87)
PO2 BLDA: 125 MMHG (ref 64–87)
PO2 BLDA: 245 MMHG (ref 64–87)
PO2 BLDA: 41 MMHG (ref 64–87)
PO2 BLDA: 63 MMHG (ref 64–87)
PO2 BLDA: 65.6 MMHG (ref 64–87)
PO2 BLDA: 71.1 MMHG (ref 64–87)
PO2 BLDA: 72 MMHG (ref 64–87)
PO2 BLDA: 84 MMHG (ref 64–87)
PO2 TEMP ADJ BLDA: 102 MMHG (ref 64–87)
PO2 TEMP ADJ BLDA: 125 MMHG (ref 64–87)
PO2 TEMP ADJ BLDA: 225 MMHG (ref 64–87)
PO2 TEMP ADJ BLDA: 30 MMHG (ref 64–87)
PO2 TEMP ADJ BLDA: 63 MMHG (ref 64–87)
PO2 TEMP ADJ BLDA: 68 MMHG (ref 64–87)
PO2 TEMP ADJ BLDA: 86 MMHG (ref 64–87)
POTASSIUM SERPL-SCNC: 3.3 MMOL/L (ref 3.6–5.5)
POTASSIUM SERPL-SCNC: 3.5 MMOL/L (ref 3.6–5.5)
POTASSIUM SERPL-SCNC: 3.5 MMOL/L (ref 3.6–5.5)
POTASSIUM SERPL-SCNC: 3.6 MMOL/L (ref 3.6–5.5)
POTASSIUM SERPL-SCNC: 3.7 MMOL/L (ref 3.6–5.5)
POTASSIUM SERPL-SCNC: 3.8 MMOL/L (ref 3.6–5.5)
POTASSIUM SERPL-SCNC: 3.9 MMOL/L (ref 3.6–5.5)
POTASSIUM SERPL-SCNC: 4 MMOL/L (ref 3.6–5.5)
POTASSIUM SERPL-SCNC: 4.2 MMOL/L (ref 3.6–5.5)
POTASSIUM SERPL-SCNC: 4.2 MMOL/L (ref 3.6–5.5)
POTASSIUM SERPL-SCNC: 4.3 MMOL/L (ref 3.6–5.5)
POTASSIUM SERPL-SCNC: 4.9 MMOL/L (ref 3.6–5.5)
POTASSIUM SERPL-SCNC: 5.3 MMOL/L (ref 3.6–5.5)
POTASSIUM SERPL-SCNC: 6 MMOL/L (ref 3.6–5.5)
POTASSIUM SERPL-SCNC: 6.5 MMOL/L (ref 3.6–5.5)
PREALB SERPL-MCNC: 12 MG/DL (ref 18–38)
PREALB SERPL-MCNC: 16 MG/DL (ref 18–38)
PROCALCITONIN SERPL-MCNC: 0.14 NG/ML
PRODUCT TYPE UPROD: NORMAL
PROT SERPL-MCNC: 4.6 G/DL (ref 6–8.2)
PROT SERPL-MCNC: 5.6 G/DL (ref 6–8.2)
PROT SERPL-MCNC: 5.8 G/DL (ref 6–8.2)
PROT SERPL-MCNC: 5.9 G/DL (ref 6–8.2)
PROT SERPL-MCNC: 5.9 G/DL (ref 6–8.2)
PROT SERPL-MCNC: 6.3 G/DL (ref 6–8.2)
PROT SERPL-MCNC: 6.7 G/DL (ref 6–8.2)
PROTHROMBIN TIME: 13.6 SEC (ref 12–14.6)
PROTHROMBIN TIME: 25.3 SEC (ref 12–14.6)
RBC # BLD AUTO: 1.83 M/UL (ref 4.7–6.1)
RBC # BLD AUTO: 2.16 M/UL (ref 4.7–6.1)
RBC # BLD AUTO: 2.33 M/UL (ref 4.7–6.1)
RBC # BLD AUTO: 2.37 M/UL (ref 4.7–6.1)
RBC # BLD AUTO: 2.37 M/UL (ref 4.7–6.1)
RBC # BLD AUTO: 2.38 M/UL (ref 4.7–6.1)
RBC # BLD AUTO: 2.41 M/UL (ref 4.7–6.1)
RBC # BLD AUTO: 2.47 M/UL (ref 4.7–6.1)
RBC # BLD AUTO: 2.52 M/UL (ref 4.7–6.1)
RBC # BLD AUTO: 2.53 M/UL (ref 4.7–6.1)
RBC # BLD AUTO: 2.61 M/UL (ref 4.7–6.1)
RBC # BLD AUTO: 2.65 M/UL (ref 4.7–6.1)
RBC # BLD AUTO: 2.72 M/UL (ref 4.7–6.1)
RBC # BLD AUTO: 2.82 M/UL (ref 4.7–6.1)
RBC # BLD AUTO: 2.94 M/UL (ref 4.7–6.1)
RETICS # AUTO: 0.07 M/UL (ref 0.04–0.06)
RETICS/RBC NFR: 2.8 % (ref 0.8–2.1)
RH BLD: NORMAL
RHODAMINE-AURAMINE STN SPEC: NORMAL
SAO2 % BLDA: 100 % (ref 93–99)
SAO2 % BLDA: 66 % (ref 93–99)
SAO2 % BLDA: 85.1 % (ref 93–99)
SAO2 % BLDA: 92.9 % (ref 93–99)
SAO2 % BLDA: 95 % (ref 93–99)
SAO2 % BLDA: 96 % (ref 93–99)
SAO2 % BLDA: 98 % (ref 93–99)
SAO2 % BLDA: 98 % (ref 93–99)
SAO2 % BLDA: 99 % (ref 93–99)
SIGNIFICANT IND 70042: ABNORMAL
SIGNIFICANT IND 70042: NORMAL
SITE SITE: ABNORMAL
SITE SITE: NORMAL
SODIUM SERPL-SCNC: 130 MMOL/L (ref 135–145)
SODIUM SERPL-SCNC: 131 MMOL/L (ref 135–145)
SODIUM SERPL-SCNC: 132 MMOL/L (ref 135–145)
SODIUM SERPL-SCNC: 134 MMOL/L (ref 135–145)
SODIUM SERPL-SCNC: 137 MMOL/L (ref 135–145)
SODIUM SERPL-SCNC: 137 MMOL/L (ref 135–145)
SODIUM SERPL-SCNC: 140 MMOL/L (ref 135–145)
SODIUM SERPL-SCNC: 142 MMOL/L (ref 135–145)
SODIUM SERPL-SCNC: 143 MMOL/L (ref 135–145)
SODIUM SERPL-SCNC: 144 MMOL/L (ref 135–145)
SODIUM SERPL-SCNC: 144 MMOL/L (ref 135–145)
SODIUM SERPL-SCNC: 146 MMOL/L (ref 135–145)
SODIUM SERPL-SCNC: 147 MMOL/L (ref 135–145)
SODIUM SERPL-SCNC: 147 MMOL/L (ref 135–145)
SODIUM SERPL-SCNC: 148 MMOL/L (ref 135–145)
SODIUM SERPL-SCNC: 150 MMOL/L (ref 135–145)
SODIUM SERPL-SCNC: 151 MMOL/L (ref 135–145)
SODIUM UR-SCNC: 40 MMOL/L
SOURCE SOURCE: ABNORMAL
SOURCE SOURCE: NORMAL
SPECIMEN DRAWN FROM PATIENT: ABNORMAL
TEG ALGORITHM TGALG: ABNORMAL
TEG ALGORITHM TGALG: ABNORMAL
TIBC SERPL-MCNC: 319 UG/DL (ref 250–450)
TRIGL SERPL-MCNC: 145 MG/DL (ref 0–149)
TRIGL SERPL-MCNC: 446 MG/DL (ref 0–149)
TRIGL SERPL-MCNC: 92 MG/DL (ref 0–149)
TROPONIN I SERPL-MCNC: 0.36 NG/ML (ref 0–0.04)
TROPONIN I SERPL-MCNC: 0.4 NG/ML (ref 0–0.04)
TROPONIN I SERPL-MCNC: 0.41 NG/ML (ref 0–0.04)
TSH SERPL DL<=0.005 MIU/L-ACNC: 0.88 UIU/ML (ref 0.38–5.33)
UNIT STATUS USTAT: NORMAL
VIT B12 SERPL-MCNC: >1500 PG/ML (ref 211–911)
WBC # BLD AUTO: 10.6 K/UL (ref 4.8–10.8)
WBC # BLD AUTO: 10.7 K/UL (ref 4.8–10.8)
WBC # BLD AUTO: 11.2 K/UL (ref 4.8–10.8)
WBC # BLD AUTO: 11.2 K/UL (ref 4.8–10.8)
WBC # BLD AUTO: 11.5 K/UL (ref 4.8–10.8)
WBC # BLD AUTO: 2.6 K/UL (ref 4.8–10.8)
WBC # BLD AUTO: 3.9 K/UL (ref 4.8–10.8)
WBC # BLD AUTO: 4.3 K/UL (ref 4.8–10.8)
WBC # BLD AUTO: 7.1 K/UL (ref 4.8–10.8)
WBC # BLD AUTO: 8.1 K/UL (ref 4.8–10.8)
WBC # BLD AUTO: 8.1 K/UL (ref 4.8–10.8)
WBC # BLD AUTO: 8.5 K/UL (ref 4.8–10.8)
WBC # BLD AUTO: 8.5 K/UL (ref 4.8–10.8)
WBC # BLD AUTO: 9.1 K/UL (ref 4.8–10.8)
WBC # BLD AUTO: 9.4 K/UL (ref 4.8–10.8)

## 2018-01-01 PROCEDURE — A9270 NON-COVERED ITEM OR SERVICE: HCPCS | Performed by: HOSPITALIST

## 2018-01-01 PROCEDURE — 97162 PT EVAL MOD COMPLEX 30 MIN: CPT

## 2018-01-01 PROCEDURE — 51798 US URINE CAPACITY MEASURE: CPT

## 2018-01-01 PROCEDURE — 700102 HCHG RX REV CODE 250 W/ 637 OVERRIDE(OP): Performed by: INTERNAL MEDICINE

## 2018-01-01 PROCEDURE — 71045 X-RAY EXAM CHEST 1 VIEW: CPT

## 2018-01-01 PROCEDURE — 95951 EEG: CPT | Mod: 52

## 2018-01-01 PROCEDURE — 82962 GLUCOSE BLOOD TEST: CPT

## 2018-01-01 PROCEDURE — 700105 HCHG RX REV CODE 258

## 2018-01-01 PROCEDURE — 36415 COLL VENOUS BLD VENIPUNCTURE: CPT

## 2018-01-01 PROCEDURE — 94003 VENT MGMT INPAT SUBQ DAY: CPT

## 2018-01-01 PROCEDURE — 31720 CLEARANCE OF AIRWAYS: CPT

## 2018-01-01 PROCEDURE — 700102 HCHG RX REV CODE 250 W/ 637 OVERRIDE(OP): Performed by: HOSPITALIST

## 2018-01-01 PROCEDURE — 86901 BLOOD TYPING SEROLOGIC RH(D): CPT

## 2018-01-01 PROCEDURE — 700111 HCHG RX REV CODE 636 W/ 250 OVERRIDE (IP): Performed by: INTERNAL MEDICINE

## 2018-01-01 PROCEDURE — 5A1D70Z PERFORMANCE OF URINARY FILTRATION, INTERMITTENT, LESS THAN 6 HOURS PER DAY: ICD-10-PCS | Performed by: INTERNAL MEDICINE

## 2018-01-01 PROCEDURE — A9270 NON-COVERED ITEM OR SERVICE: HCPCS | Performed by: INTERNAL MEDICINE

## 2018-01-01 PROCEDURE — 80048 BASIC METABOLIC PNL TOTAL CA: CPT

## 2018-01-01 PROCEDURE — 80053 COMPREHEN METABOLIC PANEL: CPT

## 2018-01-01 PROCEDURE — 700101 HCHG RX REV CODE 250: Performed by: EMERGENCY MEDICINE

## 2018-01-01 PROCEDURE — 84132 ASSAY OF SERUM POTASSIUM: CPT

## 2018-01-01 PROCEDURE — 84100 ASSAY OF PHOSPHORUS: CPT

## 2018-01-01 PROCEDURE — 700102 HCHG RX REV CODE 250 W/ 637 OVERRIDE(OP): Performed by: PSYCHIATRY & NEUROLOGY

## 2018-01-01 PROCEDURE — 99233 SBSQ HOSP IP/OBS HIGH 50: CPT | Performed by: INTERNAL MEDICINE

## 2018-01-01 PROCEDURE — 700105 HCHG RX REV CODE 258: Performed by: INTERNAL MEDICINE

## 2018-01-01 PROCEDURE — C9113 INJ PANTOPRAZOLE SODIUM, VIA: HCPCS | Performed by: INTERNAL MEDICINE

## 2018-01-01 PROCEDURE — 87070 CULTURE OTHR SPECIMN AEROBIC: CPT

## 2018-01-01 PROCEDURE — 86140 C-REACTIVE PROTEIN: CPT

## 2018-01-01 PROCEDURE — 83036 HEMOGLOBIN GLYCOSYLATED A1C: CPT

## 2018-01-01 PROCEDURE — 4A00X4Z MEASUREMENT OF CENTRAL NERVOUS ELECTRICAL ACTIVITY, EXTERNAL APPROACH: ICD-10-PCS | Performed by: PSYCHIATRY & NEUROLOGY

## 2018-01-01 PROCEDURE — 770022 HCHG ROOM/CARE - ICU (200)

## 2018-01-01 PROCEDURE — P9017 PLASMA 1 DONOR FRZ W/IN 8 HR: HCPCS | Mod: 91

## 2018-01-01 PROCEDURE — 85018 HEMOGLOBIN: CPT | Mod: 91

## 2018-01-01 PROCEDURE — 83690 ASSAY OF LIPASE: CPT

## 2018-01-01 PROCEDURE — 85025 COMPLETE CBC W/AUTO DIFF WBC: CPT

## 2018-01-01 PROCEDURE — A9270 NON-COVERED ITEM OR SERVICE: HCPCS | Performed by: PSYCHIATRY & NEUROLOGY

## 2018-01-01 PROCEDURE — 36600 WITHDRAWAL OF ARTERIAL BLOOD: CPT

## 2018-01-01 PROCEDURE — 87102 FUNGUS ISOLATION CULTURE: CPT

## 2018-01-01 PROCEDURE — 82962 GLUCOSE BLOOD TEST: CPT | Mod: 91

## 2018-01-01 PROCEDURE — 700111 HCHG RX REV CODE 636 W/ 250 OVERRIDE (IP): Performed by: HOSPITALIST

## 2018-01-01 PROCEDURE — 86706 HEP B SURFACE ANTIBODY: CPT

## 2018-01-01 PROCEDURE — 84300 ASSAY OF URINE SODIUM: CPT

## 2018-01-01 PROCEDURE — 90935 HEMODIALYSIS ONE EVALUATION: CPT

## 2018-01-01 PROCEDURE — G8996 SWALLOW CURRENT STATUS: HCPCS | Mod: CK

## 2018-01-01 PROCEDURE — G8988 SELF CARE GOAL STATUS: HCPCS | Mod: CJ

## 2018-01-01 PROCEDURE — 770001 HCHG ROOM/CARE - MED/SURG/GYN PRIV*

## 2018-01-01 PROCEDURE — 83615 LACTATE (LD) (LDH) ENZYME: CPT

## 2018-01-01 PROCEDURE — 83605 ASSAY OF LACTIC ACID: CPT | Mod: 91

## 2018-01-01 PROCEDURE — 700111 HCHG RX REV CODE 636 W/ 250 OVERRIDE (IP)

## 2018-01-01 PROCEDURE — 770006 HCHG ROOM/CARE - MED/SURG/GYN SEMI*

## 2018-01-01 PROCEDURE — 700101 HCHG RX REV CODE 250: Performed by: INTERNAL MEDICINE

## 2018-01-01 PROCEDURE — C1751 CATH, INF, PER/CENT/MIDLINE: HCPCS

## 2018-01-01 PROCEDURE — 99233 SBSQ HOSP IP/OBS HIGH 50: CPT | Performed by: HOSPITALIST

## 2018-01-01 PROCEDURE — 84443 ASSAY THYROID STIM HORMONE: CPT

## 2018-01-01 PROCEDURE — 306595 SYSTEM,FEED TUBE CLOG ZAPPER: Performed by: INTERNAL MEDICINE

## 2018-01-01 PROCEDURE — 84134 ASSAY OF PREALBUMIN: CPT

## 2018-01-01 PROCEDURE — 770020 HCHG ROOM/CARE - TELE (206)

## 2018-01-01 PROCEDURE — 96367 TX/PROPH/DG ADDL SEQ IV INF: CPT

## 2018-01-01 PROCEDURE — 85027 COMPLETE CBC AUTOMATED: CPT

## 2018-01-01 PROCEDURE — 82803 BLOOD GASES ANY COMBINATION: CPT

## 2018-01-01 PROCEDURE — C1751 CATH, INF, PER/CENT/MIDLINE: HCPCS | Performed by: EMERGENCY MEDICINE

## 2018-01-01 PROCEDURE — 82728 ASSAY OF FERRITIN: CPT

## 2018-01-01 PROCEDURE — 83735 ASSAY OF MAGNESIUM: CPT

## 2018-01-01 PROCEDURE — 99223 1ST HOSP IP/OBS HIGH 75: CPT | Mod: AI | Performed by: HOSPITALIST

## 2018-01-01 PROCEDURE — 82140 ASSAY OF AMMONIA: CPT

## 2018-01-01 PROCEDURE — 87205 SMEAR GRAM STAIN: CPT

## 2018-01-01 PROCEDURE — 94640 AIRWAY INHALATION TREATMENT: CPT

## 2018-01-01 PROCEDURE — 85018 HEMOGLOBIN: CPT

## 2018-01-01 PROCEDURE — 97530 THERAPEUTIC ACTIVITIES: CPT

## 2018-01-01 PROCEDURE — 74018 RADEX ABDOMEN 1 VIEW: CPT

## 2018-01-01 PROCEDURE — 83010 ASSAY OF HAPTOGLOBIN QUANT: CPT

## 2018-01-01 PROCEDURE — 83540 ASSAY OF IRON: CPT

## 2018-01-01 PROCEDURE — 85014 HEMATOCRIT: CPT

## 2018-01-01 PROCEDURE — 94002 VENT MGMT INPAT INIT DAY: CPT

## 2018-01-01 PROCEDURE — 30233K1 TRANSFUSION OF NONAUTOLOGOUS FROZEN PLASMA INTO PERIPHERAL VEIN, PERCUTANEOUS APPROACH: ICD-10-PCS | Performed by: EMERGENCY MEDICINE

## 2018-01-01 PROCEDURE — 85347 COAGULATION TIME ACTIVATED: CPT | Mod: 91

## 2018-01-01 PROCEDURE — 99291 CRITICAL CARE FIRST HOUR: CPT

## 2018-01-01 PROCEDURE — 84478 ASSAY OF TRIGLYCERIDES: CPT

## 2018-01-01 PROCEDURE — 84145 PROCALCITONIN (PCT): CPT

## 2018-01-01 PROCEDURE — 700105 HCHG RX REV CODE 258: Performed by: HOSPITALIST

## 2018-01-01 PROCEDURE — 82550 ASSAY OF CK (CPK): CPT

## 2018-01-01 PROCEDURE — 84132 ASSAY OF SERUM POTASSIUM: CPT | Mod: 91

## 2018-01-01 PROCEDURE — 85046 RETICYTE/HGB CONCENTRATE: CPT

## 2018-01-01 PROCEDURE — 82746 ASSAY OF FOLIC ACID SERUM: CPT

## 2018-01-01 PROCEDURE — 92610 EVALUATE SWALLOWING FUNCTION: CPT

## 2018-01-01 PROCEDURE — 93306 TTE W/DOPPLER COMPLETE: CPT

## 2018-01-01 PROCEDURE — 36556 INSERT NON-TUNNEL CV CATH: CPT

## 2018-01-01 PROCEDURE — 700101 HCHG RX REV CODE 250

## 2018-01-01 PROCEDURE — G8987 SELF CARE CURRENT STATUS: HCPCS | Mod: CL

## 2018-01-01 PROCEDURE — 5A1955Z RESPIRATORY VENTILATION, GREATER THAN 96 CONSECUTIVE HOURS: ICD-10-PCS | Performed by: EMERGENCY MEDICINE

## 2018-01-01 PROCEDURE — 82607 VITAMIN B-12: CPT

## 2018-01-01 PROCEDURE — 302978 HCHG BRONCHOSCOPY-DIAGNOSTIC

## 2018-01-01 PROCEDURE — 0B9F8ZZ DRAINAGE OF RIGHT LOWER LUNG LOBE, VIA NATURAL OR ARTIFICIAL OPENING ENDOSCOPIC: ICD-10-PCS | Performed by: INTERNAL MEDICINE

## 2018-01-01 PROCEDURE — 74019 RADEX ABDOMEN 2 VIEWS: CPT

## 2018-01-01 PROCEDURE — 85025 COMPLETE CBC W/AUTO DIFF WBC: CPT | Mod: 91

## 2018-01-01 PROCEDURE — 96375 TX/PRO/DX INJ NEW DRUG ADDON: CPT

## 2018-01-01 PROCEDURE — 96368 THER/DIAG CONCURRENT INF: CPT

## 2018-01-01 PROCEDURE — 70450 CT HEAD/BRAIN W/O DYE: CPT

## 2018-01-01 PROCEDURE — 82977 ASSAY OF GGT: CPT

## 2018-01-01 PROCEDURE — 700111 HCHG RX REV CODE 636 W/ 250 OVERRIDE (IP): Performed by: FAMILY MEDICINE

## 2018-01-01 PROCEDURE — 93005 ELECTROCARDIOGRAM TRACING: CPT | Performed by: INTERNAL MEDICINE

## 2018-01-01 PROCEDURE — 96365 THER/PROPH/DIAG IV INF INIT: CPT

## 2018-01-01 PROCEDURE — A6206 CONTACT LAYER <= 16 SQ IN: HCPCS | Performed by: HOSPITALIST

## 2018-01-01 PROCEDURE — 93010 ELECTROCARDIOGRAM REPORT: CPT | Performed by: INTERNAL MEDICINE

## 2018-01-01 PROCEDURE — 94150 VITAL CAPACITY TEST: CPT

## 2018-01-01 PROCEDURE — 74150 CT ABDOMEN W/O CONTRAST: CPT

## 2018-01-01 PROCEDURE — 97165 OT EVAL LOW COMPLEX 30 MIN: CPT

## 2018-01-01 PROCEDURE — 302136 NUTRITION PUMP: Performed by: INTERNAL MEDICINE

## 2018-01-01 PROCEDURE — 700101 HCHG RX REV CODE 250: Performed by: HOSPITALIST

## 2018-01-01 PROCEDURE — B543ZZA ULTRASONOGRAPHY OF RIGHT JUGULAR VEINS, GUIDANCE: ICD-10-PCS | Performed by: EMERGENCY MEDICINE

## 2018-01-01 PROCEDURE — 02HV33Z INSERTION OF INFUSION DEVICE INTO SUPERIOR VENA CAVA, PERCUTANEOUS APPROACH: ICD-10-PCS | Performed by: INTERNAL MEDICINE

## 2018-01-01 PROCEDURE — 93306 TTE W/DOPPLER COMPLETE: CPT | Mod: 26 | Performed by: INTERNAL MEDICINE

## 2018-01-01 PROCEDURE — 86850 RBC ANTIBODY SCREEN: CPT

## 2018-01-01 PROCEDURE — 82803 BLOOD GASES ANY COMBINATION: CPT | Mod: 91

## 2018-01-01 PROCEDURE — 85730 THROMBOPLASTIN TIME PARTIAL: CPT

## 2018-01-01 PROCEDURE — G8997 SWALLOW GOAL STATUS: HCPCS | Mod: CI

## 2018-01-01 PROCEDURE — 84100 ASSAY OF PHOSPHORUS: CPT | Mod: 91

## 2018-01-01 PROCEDURE — 87040 BLOOD CULTURE FOR BACTERIA: CPT

## 2018-01-01 PROCEDURE — 302214 INTUBATION BOX: Performed by: EMERGENCY MEDICINE

## 2018-01-01 PROCEDURE — 99356 PR PROLONGED SVC I/P OR OBS SETTING 1ST HOUR: CPT | Performed by: INTERNAL MEDICINE

## 2018-01-01 PROCEDURE — 88305 TISSUE EXAM BY PATHOLOGIST: CPT

## 2018-01-01 PROCEDURE — 700111 HCHG RX REV CODE 636 W/ 250 OVERRIDE (IP): Mod: JG | Performed by: INTERNAL MEDICINE

## 2018-01-01 PROCEDURE — P9016 RBC LEUKOCYTES REDUCED: HCPCS | Mod: 91

## 2018-01-01 PROCEDURE — 84484 ASSAY OF TROPONIN QUANT: CPT | Mod: 91

## 2018-01-01 PROCEDURE — 87116 MYCOBACTERIA CULTURE: CPT

## 2018-01-01 PROCEDURE — 88112 CYTOPATH CELL ENHANCE TECH: CPT

## 2018-01-01 PROCEDURE — 83550 IRON BINDING TEST: CPT

## 2018-01-01 PROCEDURE — 30233L1 TRANSFUSION OF NONAUTOLOGOUS FRESH PLASMA INTO PERIPHERAL VEIN, PERCUTANEOUS APPROACH: ICD-10-PCS | Performed by: EMERGENCY MEDICINE

## 2018-01-01 PROCEDURE — 86900 BLOOD TYPING SEROLOGIC ABO: CPT

## 2018-01-01 PROCEDURE — 700105 HCHG RX REV CODE 258: Performed by: EMERGENCY MEDICINE

## 2018-01-01 PROCEDURE — G8979 MOBILITY GOAL STATUS: HCPCS | Mod: CJ

## 2018-01-01 PROCEDURE — 700111 HCHG RX REV CODE 636 W/ 250 OVERRIDE (IP): Performed by: EMERGENCY MEDICINE

## 2018-01-01 PROCEDURE — 02HV33Z INSERTION OF INFUSION DEVICE INTO SUPERIOR VENA CAVA, PERCUTANEOUS APPROACH: ICD-10-PCS | Performed by: EMERGENCY MEDICINE

## 2018-01-01 PROCEDURE — 85610 PROTHROMBIN TIME: CPT

## 2018-01-01 PROCEDURE — 86923 COMPATIBILITY TEST ELECTRIC: CPT | Mod: 91

## 2018-01-01 PROCEDURE — 85384 FIBRINOGEN ACTIVITY: CPT

## 2018-01-01 PROCEDURE — 0BH18EZ INSERTION OF ENDOTRACHEAL AIRWAY INTO TRACHEA, VIA NATURAL OR ARTIFICIAL OPENING ENDOSCOPIC: ICD-10-PCS | Performed by: EMERGENCY MEDICINE

## 2018-01-01 PROCEDURE — 80048 BASIC METABOLIC PNL TOTAL CA: CPT | Mod: 91

## 2018-01-01 PROCEDURE — G8978 MOBILITY CURRENT STATUS: HCPCS | Mod: CL

## 2018-01-01 PROCEDURE — 82570 ASSAY OF URINE CREATININE: CPT

## 2018-01-01 PROCEDURE — 87206 SMEAR FLUORESCENT/ACID STAI: CPT

## 2018-01-01 PROCEDURE — 306565 RIGID MIT RESTRAINT(PAIR): Performed by: INTERNAL MEDICINE

## 2018-01-01 PROCEDURE — 30233N1 TRANSFUSION OF NONAUTOLOGOUS RED BLOOD CELLS INTO PERIPHERAL VEIN, PERCUTANEOUS APPROACH: ICD-10-PCS | Performed by: EMERGENCY MEDICINE

## 2018-01-01 PROCEDURE — 82533 TOTAL CORTISOL: CPT

## 2018-01-01 PROCEDURE — 87015 SPECIMEN INFECT AGNT CONCNTJ: CPT

## 2018-01-01 PROCEDURE — 85576 BLOOD PLATELET AGGREGATION: CPT | Mod: 91

## 2018-01-01 PROCEDURE — 36430 TRANSFUSION BLD/BLD COMPNT: CPT

## 2018-01-01 PROCEDURE — 93005 ELECTROCARDIOGRAM TRACING: CPT | Performed by: EMERGENCY MEDICINE

## 2018-01-01 PROCEDURE — 36556 INSERT NON-TUNNEL CV CATH: CPT | Mod: RT

## 2018-01-01 PROCEDURE — 80074 ACUTE HEPATITIS PANEL: CPT

## 2018-01-01 RX ORDER — LORAZEPAM 2 MG/ML
1 CONCENTRATE ORAL
Status: DISCONTINUED | OUTPATIENT
Start: 2018-01-01 | End: 2018-05-08 | Stop reason: HOSPADM

## 2018-01-01 RX ORDER — CALCIUM CHLORIDE 100 MG/ML
INJECTION INTRAVENOUS; INTRAVENTRICULAR
Status: COMPLETED
Start: 2018-01-01 | End: 2018-01-01

## 2018-01-01 RX ORDER — AZITHROMYCIN 250 MG/1
250 TABLET, FILM COATED ORAL DAILY
Status: COMPLETED | OUTPATIENT
Start: 2018-01-01 | End: 2018-01-01

## 2018-01-01 RX ORDER — AMOXICILLIN 250 MG
2 CAPSULE ORAL 2 TIMES DAILY
Status: DISCONTINUED | OUTPATIENT
Start: 2018-01-01 | End: 2018-01-01

## 2018-01-01 RX ORDER — CHLORHEXIDINE GLUCONATE ORAL RINSE 1.2 MG/ML
15 SOLUTION DENTAL 2 TIMES DAILY
Status: DISCONTINUED | OUTPATIENT
Start: 2018-01-01 | End: 2018-01-01

## 2018-01-01 RX ORDER — GEMFIBROZIL 600 MG/1
600 TABLET, FILM COATED ORAL DAILY
Status: DISCONTINUED | OUTPATIENT
Start: 2018-01-01 | End: 2018-01-01

## 2018-01-01 RX ORDER — LEVETIRACETAM 100 MG/ML
500 SOLUTION ORAL EVERY 12 HOURS
Qty: 240 ML
Start: 2018-01-01

## 2018-01-01 RX ORDER — ALPRAZOLAM 0.5 MG/1
1 TABLET ORAL ONCE
Status: COMPLETED | OUTPATIENT
Start: 2018-01-01 | End: 2018-01-01

## 2018-01-01 RX ORDER — POTASSIUM CHLORIDE 7.45 MG/ML
10 INJECTION INTRAVENOUS
Status: COMPLETED | OUTPATIENT
Start: 2018-01-01 | End: 2018-01-01

## 2018-01-01 RX ORDER — PHENYLEPHRINE HCL IN 0.9% NACL 0.5 MG/5ML
100 SYRINGE (ML) INTRAVENOUS
Status: DISCONTINUED | OUTPATIENT
Start: 2018-01-01 | End: 2018-01-01

## 2018-01-01 RX ORDER — ONDANSETRON 4 MG/1
4 TABLET, ORALLY DISINTEGRATING ORAL EVERY 4 HOURS PRN
Status: DISCONTINUED | OUTPATIENT
Start: 2018-01-01 | End: 2018-01-01

## 2018-01-01 RX ORDER — INSULIN GLARGINE 100 [IU]/ML
10 INJECTION, SOLUTION SUBCUTANEOUS EVERY EVENING
Status: DISCONTINUED | OUTPATIENT
Start: 2018-01-01 | End: 2018-01-01

## 2018-01-01 RX ORDER — HEPARIN SODIUM 1000 [USP'U]/ML
INJECTION, SOLUTION INTRAVENOUS; SUBCUTANEOUS
Status: COMPLETED
Start: 2018-01-01 | End: 2018-01-01

## 2018-01-01 RX ORDER — GEMFIBROZIL 600 MG/1
600 TABLET, FILM COATED ORAL 2 TIMES DAILY
Status: DISCONTINUED | OUTPATIENT
Start: 2018-01-01 | End: 2018-01-01

## 2018-01-01 RX ORDER — HALOPERIDOL 5 MG/ML
1 INJECTION INTRAMUSCULAR EVERY 4 HOURS PRN
Status: DISCONTINUED | OUTPATIENT
Start: 2018-01-01 | End: 2018-01-01

## 2018-01-01 RX ORDER — LEVETIRACETAM 100 MG/ML
1000 SOLUTION ORAL EVERY 12 HOURS
Status: DISCONTINUED | OUTPATIENT
Start: 2018-01-01 | End: 2018-01-01

## 2018-01-01 RX ORDER — HALOPERIDOL 5 MG/ML
2 INJECTION INTRAMUSCULAR
Status: DISCONTINUED | OUTPATIENT
Start: 2018-01-01 | End: 2018-01-01

## 2018-01-01 RX ORDER — HALOPERIDOL 5 MG/ML
1 INJECTION INTRAMUSCULAR
Status: DISCONTINUED | OUTPATIENT
Start: 2018-01-01 | End: 2018-01-01

## 2018-01-01 RX ORDER — ONDANSETRON 2 MG/ML
4 INJECTION INTRAMUSCULAR; INTRAVENOUS EVERY 4 HOURS PRN
Status: DISCONTINUED | OUTPATIENT
Start: 2018-01-01 | End: 2018-01-01

## 2018-01-01 RX ORDER — ACETAMINOPHEN 325 MG/1
650 TABLET ORAL EVERY 6 HOURS PRN
Status: DISCONTINUED | OUTPATIENT
Start: 2018-01-01 | End: 2018-01-01

## 2018-01-01 RX ORDER — DEXTROSE, SODIUM CHLORIDE, SODIUM LACTATE, POTASSIUM CHLORIDE, AND CALCIUM CHLORIDE 5; .6; .31; .03; .02 G/100ML; G/100ML; G/100ML; G/100ML; G/100ML
INJECTION, SOLUTION INTRAVENOUS CONTINUOUS
Status: DISCONTINUED | OUTPATIENT
Start: 2018-01-01 | End: 2018-01-01

## 2018-01-01 RX ORDER — MIDAZOLAM HYDROCHLORIDE 1 MG/ML
2 INJECTION INTRAMUSCULAR; INTRAVENOUS ONCE
Status: COMPLETED | OUTPATIENT
Start: 2018-01-01 | End: 2018-01-01

## 2018-01-01 RX ORDER — SODIUM CHLORIDE, SODIUM LACTATE, POTASSIUM CHLORIDE, CALCIUM CHLORIDE 600; 310; 30; 20 MG/100ML; MG/100ML; MG/100ML; MG/100ML
2000 INJECTION, SOLUTION INTRAVENOUS ONCE
Status: COMPLETED | OUTPATIENT
Start: 2018-01-01 | End: 2018-01-01

## 2018-01-01 RX ORDER — SODIUM CHLORIDE 450 MG/100ML
INJECTION, SOLUTION INTRAVENOUS CONTINUOUS
Status: DISCONTINUED | OUTPATIENT
Start: 2018-01-01 | End: 2018-01-01

## 2018-01-01 RX ORDER — SODIUM CHLORIDE 9 MG/ML
INJECTION, SOLUTION INTRAVENOUS
Status: COMPLETED
Start: 2018-01-01 | End: 2018-01-01

## 2018-01-01 RX ORDER — AZITHROMYCIN 500 MG/1
500 INJECTION, POWDER, LYOPHILIZED, FOR SOLUTION INTRAVENOUS ONCE
Status: COMPLETED | OUTPATIENT
Start: 2018-01-01 | End: 2018-01-01

## 2018-01-01 RX ORDER — SODIUM CHLORIDE 9 MG/ML
INJECTION, SOLUTION INTRAVENOUS
Status: ACTIVE
Start: 2018-01-01 | End: 2018-01-01

## 2018-01-01 RX ORDER — POTASSIUM CHLORIDE 14.9 MG/ML
20 INJECTION INTRAVENOUS ONCE
Status: COMPLETED | OUTPATIENT
Start: 2018-01-01 | End: 2018-01-01

## 2018-01-01 RX ORDER — LEVETIRACETAM 100 MG/ML
500 SOLUTION ORAL EVERY 12 HOURS
Status: DISCONTINUED | OUTPATIENT
Start: 2018-01-01 | End: 2018-01-01

## 2018-01-01 RX ORDER — HYDRALAZINE HYDROCHLORIDE 20 MG/ML
20 INJECTION INTRAMUSCULAR; INTRAVENOUS EVERY 6 HOURS PRN
Status: DISCONTINUED | OUTPATIENT
Start: 2018-01-01 | End: 2018-01-01

## 2018-01-01 RX ORDER — HEPARIN SODIUM 1000 [USP'U]/ML
3000 INJECTION, SOLUTION INTRAVENOUS; SUBCUTANEOUS
Status: DISCONTINUED | OUTPATIENT
Start: 2018-01-01 | End: 2018-01-01 | Stop reason: HOSPADM

## 2018-01-01 RX ORDER — DEXTROSE MONOHYDRATE 50 MG/ML
INJECTION, SOLUTION INTRAVENOUS CONTINUOUS
Status: DISCONTINUED | OUTPATIENT
Start: 2018-01-01 | End: 2018-01-01

## 2018-01-01 RX ORDER — CALCIUM CHLORIDE 100 MG/ML
1 INJECTION INTRAVENOUS; INTRAVENTRICULAR ONCE
Status: COMPLETED | OUTPATIENT
Start: 2018-01-01 | End: 2018-01-01

## 2018-01-01 RX ORDER — LORAZEPAM 2 MG/ML
1 INJECTION INTRAMUSCULAR ONCE
Status: COMPLETED | OUTPATIENT
Start: 2018-01-01 | End: 2018-01-01

## 2018-01-01 RX ORDER — PANTOPRAZOLE SODIUM 40 MG/10ML
40 INJECTION, POWDER, LYOPHILIZED, FOR SOLUTION INTRAVENOUS EVERY 12 HOURS
Status: DISCONTINUED | OUTPATIENT
Start: 2018-01-01 | End: 2018-01-01

## 2018-01-01 RX ORDER — DEXTROSE MONOHYDRATE 25 G/50ML
25 INJECTION, SOLUTION INTRAVENOUS
Status: DISCONTINUED | OUTPATIENT
Start: 2018-01-01 | End: 2018-01-01 | Stop reason: HOSPADM

## 2018-01-01 RX ORDER — ATROPINE SULFATE 10 MG/ML
2 SOLUTION/ DROPS OPHTHALMIC EVERY 4 HOURS PRN
Status: DISCONTINUED | OUTPATIENT
Start: 2018-01-01 | End: 2018-05-08 | Stop reason: HOSPADM

## 2018-01-01 RX ORDER — POLYETHYLENE GLYCOL 3350 17 G/17G
1 POWDER, FOR SOLUTION ORAL
Status: DISCONTINUED | OUTPATIENT
Start: 2018-01-01 | End: 2018-01-01

## 2018-01-01 RX ORDER — ROCURONIUM BROMIDE 10 MG/ML
INJECTION, SOLUTION INTRAVENOUS
Status: COMPLETED
Start: 2018-01-01 | End: 2018-01-01

## 2018-01-01 RX ORDER — AMLODIPINE BESYLATE 10 MG/1
10 TABLET ORAL ONCE
Status: COMPLETED | OUTPATIENT
Start: 2018-01-01 | End: 2018-01-01

## 2018-01-01 RX ORDER — LISINOPRIL 10 MG/1
10 TABLET ORAL EVERY EVENING
COMMUNITY

## 2018-01-01 RX ORDER — GEMFIBROZIL 600 MG/1
300 TABLET, FILM COATED ORAL NIGHTLY
Status: DISCONTINUED | OUTPATIENT
Start: 2018-01-01 | End: 2018-01-01

## 2018-01-01 RX ORDER — LEVETIRACETAM 500 MG/1
500 TABLET ORAL 2 TIMES DAILY
Status: DISCONTINUED | OUTPATIENT
Start: 2018-01-01 | End: 2018-01-01

## 2018-01-01 RX ORDER — ROCURONIUM BROMIDE 10 MG/ML
50 INJECTION, SOLUTION INTRAVENOUS ONCE
Status: ACTIVE | OUTPATIENT
Start: 2018-01-01 | End: 2018-01-01

## 2018-01-01 RX ORDER — ETOMIDATE 2 MG/ML
10 INJECTION INTRAVENOUS ONCE
Status: COMPLETED | OUTPATIENT
Start: 2018-01-01 | End: 2018-01-01

## 2018-01-01 RX ORDER — LORAZEPAM 2 MG/ML
1 INJECTION INTRAMUSCULAR
Status: DISCONTINUED | OUTPATIENT
Start: 2018-01-01 | End: 2018-05-08 | Stop reason: HOSPADM

## 2018-01-01 RX ORDER — BISACODYL 10 MG
10 SUPPOSITORY, RECTAL RECTAL
Status: DISCONTINUED | OUTPATIENT
Start: 2018-01-01 | End: 2018-01-01

## 2018-01-01 RX ORDER — DEXTROSE MONOHYDRATE 25 G/50ML
50 INJECTION, SOLUTION INTRAVENOUS ONCE
Status: COMPLETED | OUTPATIENT
Start: 2018-01-01 | End: 2018-01-01

## 2018-01-01 RX ORDER — MORPHINE SULFATE 4 MG/ML
2-4 INJECTION, SOLUTION INTRAMUSCULAR; INTRAVENOUS EVERY 4 HOURS PRN
Status: DISCONTINUED | OUTPATIENT
Start: 2018-01-01 | End: 2018-01-01

## 2018-01-01 RX ORDER — HALOPERIDOL 5 MG/ML
2-5 INJECTION INTRAMUSCULAR EVERY 6 HOURS PRN
Status: DISCONTINUED | OUTPATIENT
Start: 2018-01-01 | End: 2018-01-01

## 2018-01-01 RX ORDER — AMLODIPINE BESYLATE 10 MG/1
10 TABLET ORAL
Status: DISCONTINUED | OUTPATIENT
Start: 2018-01-01 | End: 2018-01-01

## 2018-01-01 RX ORDER — FAMOTIDINE 20 MG/1
20 TABLET, FILM COATED ORAL DAILY
Status: DISCONTINUED | OUTPATIENT
Start: 2018-01-01 | End: 2018-01-01

## 2018-01-01 RX ORDER — LORAZEPAM 2 MG/ML
1-2 INJECTION INTRAMUSCULAR
Status: DISCONTINUED | OUTPATIENT
Start: 2018-01-01 | End: 2018-01-01

## 2018-01-01 RX ORDER — LIDOCAINE HYDROCHLORIDE 10 MG/ML
1 INJECTION, SOLUTION INFILTRATION; PERINEURAL PRN
Status: DISCONTINUED | OUTPATIENT
Start: 2018-01-01 | End: 2018-01-01 | Stop reason: HOSPADM

## 2018-01-01 RX ORDER — HEPARIN SODIUM 5000 [USP'U]/ML
5000 INJECTION, SOLUTION INTRAVENOUS; SUBCUTANEOUS EVERY 8 HOURS
Status: DISCONTINUED | OUTPATIENT
Start: 2018-01-01 | End: 2018-01-01

## 2018-01-01 RX ORDER — IPRATROPIUM BROMIDE AND ALBUTEROL SULFATE 2.5; .5 MG/3ML; MG/3ML
3 SOLUTION RESPIRATORY (INHALATION)
Status: DISCONTINUED | OUTPATIENT
Start: 2018-01-01 | End: 2018-01-01

## 2018-01-01 RX ORDER — HEPARIN SODIUM 1000 [USP'U]/ML
3100 INJECTION, SOLUTION INTRAVENOUS; SUBCUTANEOUS
Status: DISCONTINUED | OUTPATIENT
Start: 2018-01-01 | End: 2018-01-01

## 2018-01-01 RX ORDER — GEMFIBROZIL 600 MG/1
600 TABLET, FILM COATED ORAL 2 TIMES DAILY
COMMUNITY

## 2018-01-01 RX ORDER — DIPHENHYDRAMINE HYDROCHLORIDE 50 MG/ML
25 INJECTION INTRAMUSCULAR; INTRAVENOUS ONCE
Status: COMPLETED | OUTPATIENT
Start: 2018-01-01 | End: 2018-01-01

## 2018-01-01 RX ORDER — ALUMINA, MAGNESIA, AND SIMETHICONE 2400; 2400; 240 MG/30ML; MG/30ML; MG/30ML
10 SUSPENSION ORAL 4 TIMES DAILY PRN
Status: DISCONTINUED | OUTPATIENT
Start: 2018-01-01 | End: 2018-01-01

## 2018-01-01 RX ORDER — SODIUM CHLORIDE, SODIUM LACTATE, POTASSIUM CHLORIDE, CALCIUM CHLORIDE 600; 310; 30; 20 MG/100ML; MG/100ML; MG/100ML; MG/100ML
INJECTION, SOLUTION INTRAVENOUS CONTINUOUS
Status: DISCONTINUED | OUTPATIENT
Start: 2018-01-01 | End: 2018-01-01

## 2018-01-01 RX ORDER — ROCURONIUM BROMIDE 10 MG/ML
100 INJECTION, SOLUTION INTRAVENOUS ONCE
Status: COMPLETED | OUTPATIENT
Start: 2018-01-01 | End: 2018-01-01

## 2018-01-01 RX ORDER — POTASSIUM CHLORIDE 14.9 MG/ML
20 INJECTION INTRAVENOUS ONCE
Status: DISCONTINUED | OUTPATIENT
Start: 2018-01-01 | End: 2018-01-01

## 2018-01-01 RX ORDER — AMLODIPINE BESYLATE 5 MG/1
5 TABLET ORAL
Status: DISCONTINUED | OUTPATIENT
Start: 2018-01-01 | End: 2018-01-01

## 2018-01-01 RX ORDER — AMLODIPINE BESYLATE 10 MG/1
10 TABLET ORAL DAILY
Qty: 30 TAB
Start: 2018-01-01

## 2018-01-01 RX ORDER — ASPIRIN 81 MG/1
81 TABLET, CHEWABLE ORAL DAILY
Status: DISCONTINUED | OUTPATIENT
Start: 2018-01-01 | End: 2018-01-01

## 2018-01-01 RX ADMIN — LEVETIRACETAM 500 MG: 100 SOLUTION ORAL at 08:58

## 2018-01-01 RX ADMIN — CHLORHEXIDINE GLUCONATE 15 ML: 1.2 RINSE ORAL at 20:12

## 2018-01-01 RX ADMIN — LEVETIRACETAM 500 MG: 100 SOLUTION ORAL at 18:39

## 2018-01-01 RX ADMIN — SODIUM CHLORIDE 1000 MG: 9 INJECTION, SOLUTION INTRAVENOUS at 00:38

## 2018-01-01 RX ADMIN — SODIUM CHLORIDE: 9 INJECTION, SOLUTION INTRAVENOUS at 08:30

## 2018-01-01 RX ADMIN — GEMFIBROZIL 600 MG: 600 TABLET ORAL at 10:50

## 2018-01-01 RX ADMIN — IPRATROPIUM BROMIDE AND ALBUTEROL SULFATE 3 ML: .5; 3 SOLUTION RESPIRATORY (INHALATION) at 03:43

## 2018-01-01 RX ADMIN — HEPARIN SODIUM 3100 UNITS: 1000 INJECTION, SOLUTION INTRAVENOUS; SUBCUTANEOUS at 18:34

## 2018-01-01 RX ADMIN — LORAZEPAM 2 MG: 2 INJECTION INTRAMUSCULAR; INTRAVENOUS at 09:10

## 2018-01-01 RX ADMIN — HYDROCORTISONE SODIUM SUCCINATE 100 MG: 100 INJECTION, POWDER, FOR SOLUTION INTRAMUSCULAR; INTRAVENOUS at 21:21

## 2018-01-01 RX ADMIN — SENNOSIDES AND DOCUSATE SODIUM 2 TABLET: 8.6; 5 TABLET ORAL at 09:04

## 2018-01-01 RX ADMIN — FENTANYL CITRATE 50 MCG: 50 INJECTION INTRAMUSCULAR; INTRAVENOUS at 20:42

## 2018-01-01 RX ADMIN — SODIUM CHLORIDE, POTASSIUM CHLORIDE, SODIUM LACTATE AND CALCIUM CHLORIDE: 600; 310; 30; 20 INJECTION, SOLUTION INTRAVENOUS at 11:19

## 2018-01-01 RX ADMIN — INSULIN HUMAN 2 UNITS: 100 INJECTION, SOLUTION PARENTERAL at 06:37

## 2018-01-01 RX ADMIN — SENNOSIDES AND DOCUSATE SODIUM 2 TABLET: 8.6; 5 TABLET ORAL at 20:38

## 2018-01-01 RX ADMIN — HEPARIN SODIUM 5000 UNITS: 5000 INJECTION, SOLUTION INTRAVENOUS; SUBCUTANEOUS at 21:08

## 2018-01-01 RX ADMIN — HEPARIN SODIUM 3000 UNITS: 1000 INJECTION, SOLUTION INTRAVENOUS; SUBCUTANEOUS at 12:25

## 2018-01-01 RX ADMIN — VASOPRESSIN 0.03 UNITS/MIN: 20 INJECTION INTRAVENOUS at 08:37

## 2018-01-01 RX ADMIN — IPRATROPIUM BROMIDE AND ALBUTEROL SULFATE 3 ML: .5; 3 SOLUTION RESPIRATORY (INHALATION) at 06:33

## 2018-01-01 RX ADMIN — HEPARIN SODIUM 5000 UNITS: 5000 INJECTION, SOLUTION INTRAVENOUS; SUBCUTANEOUS at 13:20

## 2018-01-01 RX ADMIN — INSULIN HUMAN 6 UNITS: 100 INJECTION, SOLUTION PARENTERAL at 00:34

## 2018-01-01 RX ADMIN — CALCIUM CHLORIDE 1 G: 100 INJECTION INTRAVENOUS; INTRAVENTRICULAR at 08:39

## 2018-01-01 RX ADMIN — INSULIN HUMAN 2 UNITS: 100 INJECTION, SOLUTION PARENTERAL at 16:46

## 2018-01-01 RX ADMIN — IPRATROPIUM BROMIDE AND ALBUTEROL SULFATE 3 ML: .5; 3 SOLUTION RESPIRATORY (INHALATION) at 10:59

## 2018-01-01 RX ADMIN — SENNOSIDES AND DOCUSATE SODIUM 2 TABLET: 8.6; 5 TABLET ORAL at 18:17

## 2018-01-01 RX ADMIN — LEVETIRACETAM 1000 MG: 100 SOLUTION ORAL at 08:18

## 2018-01-01 RX ADMIN — SENNOSIDES AND DOCUSATE SODIUM 2 TABLET: 8.6; 5 TABLET ORAL at 21:08

## 2018-01-01 RX ADMIN — CHLORHEXIDINE GLUCONATE 15 ML: 1.2 RINSE ORAL at 09:06

## 2018-01-01 RX ADMIN — FENTANYL CITRATE 100 MCG: 50 INJECTION, SOLUTION INTRAMUSCULAR; INTRAVENOUS at 20:37

## 2018-01-01 RX ADMIN — HEPARIN SODIUM 5000 UNITS: 5000 INJECTION, SOLUTION INTRAVENOUS; SUBCUTANEOUS at 14:30

## 2018-01-01 RX ADMIN — INSULIN HUMAN 2 UNITS: 100 INJECTION, SOLUTION PARENTERAL at 05:48

## 2018-01-01 RX ADMIN — AZITHROMYCIN MONOHYDRATE 500 MG: 500 INJECTION, POWDER, LYOPHILIZED, FOR SOLUTION INTRAVENOUS at 03:08

## 2018-01-01 RX ADMIN — CALCIUM CHLORIDE 1 G: 100 INJECTION, SOLUTION INTRAVENOUS at 08:39

## 2018-01-01 RX ADMIN — LEVETIRACETAM 500 MG: 100 SOLUTION ORAL at 23:43

## 2018-01-01 RX ADMIN — HYDROCORTISONE SODIUM SUCCINATE 25 MG: 100 INJECTION, POWDER, FOR SOLUTION INTRAMUSCULAR; INTRAVENOUS at 12:40

## 2018-01-01 RX ADMIN — DEXMEDETOMIDINE HYDROCHLORIDE 1.5 MCG/KG/HR: 100 INJECTION, SOLUTION INTRAVENOUS at 22:30

## 2018-01-01 RX ADMIN — SENNOSIDES AND DOCUSATE SODIUM 2 TABLET: 8.6; 5 TABLET ORAL at 17:42

## 2018-01-01 RX ADMIN — GEMFIBROZIL 600 MG: 600 TABLET ORAL at 22:29

## 2018-01-01 RX ADMIN — FENTANYL CITRATE 50 MCG: 50 INJECTION INTRAMUSCULAR; INTRAVENOUS at 22:00

## 2018-01-01 RX ADMIN — AMPICILLIN SODIUM AND SULBACTAM SODIUM 3 G: 2; 1 INJECTION, POWDER, FOR SOLUTION INTRAMUSCULAR; INTRAVENOUS at 15:00

## 2018-01-01 RX ADMIN — SODIUM CHLORIDE 1000 MG: 9 INJECTION, SOLUTION INTRAVENOUS at 00:02

## 2018-01-01 RX ADMIN — HYDROCORTISONE SODIUM SUCCINATE 100 MG: 100 INJECTION, POWDER, FOR SOLUTION INTRAMUSCULAR; INTRAVENOUS at 21:05

## 2018-01-01 RX ADMIN — SENNOSIDES AND DOCUSATE SODIUM 2 TABLET: 8.6; 5 TABLET ORAL at 08:44

## 2018-01-01 RX ADMIN — ALUMINUM HYDROXIDE, MAGNESIUM HYDROXIDE,SIMETHICONE 10 ML: 400; 400; 40 LIQUID ORAL at 15:20

## 2018-01-01 RX ADMIN — HEPARIN SODIUM 5000 UNITS: 5000 INJECTION, SOLUTION INTRAVENOUS; SUBCUTANEOUS at 05:34

## 2018-01-01 RX ADMIN — AMLODIPINE BESYLATE 10 MG: 10 TABLET ORAL at 04:06

## 2018-01-01 RX ADMIN — ACETAMINOPHEN 650 MG: 325 TABLET, FILM COATED ORAL at 20:10

## 2018-01-01 RX ADMIN — HYDROCORTISONE SODIUM SUCCINATE 100 MG: 100 INJECTION, POWDER, FOR SOLUTION INTRAMUSCULAR; INTRAVENOUS at 05:41

## 2018-01-01 RX ADMIN — LEVETIRACETAM 500 MG: 100 SOLUTION ORAL at 08:40

## 2018-01-01 RX ADMIN — MAGNESIUM HYDROXIDE 30 ML: 400 SUSPENSION ORAL at 21:50

## 2018-01-01 RX ADMIN — AMPICILLIN SODIUM AND SULBACTAM SODIUM 3 G: 2; 1 INJECTION, POWDER, FOR SOLUTION INTRAMUSCULAR; INTRAVENOUS at 03:03

## 2018-01-01 RX ADMIN — LEVETIRACETAM 1000 MG: 100 SOLUTION ORAL at 20:40

## 2018-01-01 RX ADMIN — FAMOTIDINE 20 MG: 20 TABLET ORAL at 08:56

## 2018-01-01 RX ADMIN — INSULIN HUMAN 6 UNITS: 100 INJECTION, SOLUTION PARENTERAL at 05:56

## 2018-01-01 RX ADMIN — NOREPINEPHRINE BITARTRATE 3 MCG/MIN: 1 INJECTION INTRAVENOUS at 06:14

## 2018-01-01 RX ADMIN — INSULIN HUMAN 5 UNITS: 100 INJECTION, SOLUTION PARENTERAL at 11:36

## 2018-01-01 RX ADMIN — SENNOSIDES AND DOCUSATE SODIUM 2 TABLET: 8.6; 5 TABLET ORAL at 09:06

## 2018-01-01 RX ADMIN — SENNOSIDES AND DOCUSATE SODIUM 2 TABLET: 8.6; 5 TABLET ORAL at 22:29

## 2018-01-01 RX ADMIN — CHLORHEXIDINE GLUCONATE 15 ML: 1.2 RINSE ORAL at 21:20

## 2018-01-01 RX ADMIN — IPRATROPIUM BROMIDE AND ALBUTEROL SULFATE 3 ML: .5; 3 SOLUTION RESPIRATORY (INHALATION) at 10:35

## 2018-01-01 RX ADMIN — HEPARIN SODIUM 5000 UNITS: 5000 INJECTION, SOLUTION INTRAVENOUS; SUBCUTANEOUS at 14:38

## 2018-01-01 RX ADMIN — LORAZEPAM 1 MG: 2 INJECTION INTRAMUSCULAR; INTRAVENOUS at 21:38

## 2018-01-01 RX ADMIN — HEPARIN SODIUM 5000 UNITS: 5000 INJECTION, SOLUTION INTRAVENOUS; SUBCUTANEOUS at 13:08

## 2018-01-01 RX ADMIN — HEPARIN SODIUM 5000 UNITS: 5000 INJECTION, SOLUTION INTRAVENOUS; SUBCUTANEOUS at 13:50

## 2018-01-01 RX ADMIN — OMEPRAZOLE 40 MG: 20 CAPSULE, DELAYED RELEASE ORAL at 09:04

## 2018-01-01 RX ADMIN — PROPOFOL 25 MCG/KG/MIN: 10 INJECTION, EMULSION INTRAVENOUS at 16:06

## 2018-01-01 RX ADMIN — POTASSIUM CHLORIDE 10 MEQ: 7.46 INJECTION, SOLUTION INTRAVENOUS at 23:24

## 2018-01-01 RX ADMIN — IPRATROPIUM BROMIDE AND ALBUTEROL SULFATE 3 ML: .5; 3 SOLUTION RESPIRATORY (INHALATION) at 07:18

## 2018-01-01 RX ADMIN — SODIUM CHLORIDE 1000 MG: 9 INJECTION, SOLUTION INTRAVENOUS at 12:33

## 2018-01-01 RX ADMIN — SENNOSIDES AND DOCUSATE SODIUM 2 TABLET: 8.6; 5 TABLET ORAL at 21:20

## 2018-01-01 RX ADMIN — HYDROCORTISONE SODIUM SUCCINATE 50 MG: 100 INJECTION, POWDER, FOR SOLUTION INTRAMUSCULAR; INTRAVENOUS at 13:14

## 2018-01-01 RX ADMIN — GEMFIBROZIL 600 MG: 600 TABLET ORAL at 19:37

## 2018-01-01 RX ADMIN — LEVETIRACETAM 500 MG: 100 SOLUTION ORAL at 23:24

## 2018-01-01 RX ADMIN — SODIUM CHLORIDE: 4.5 INJECTION, SOLUTION INTRAVENOUS at 07:36

## 2018-01-01 RX ADMIN — INSULIN HUMAN 5 UNITS: 100 INJECTION, SOLUTION PARENTERAL at 00:08

## 2018-01-01 RX ADMIN — DEXMEDETOMIDINE HYDROCHLORIDE 1.4 MCG/KG/HR: 100 INJECTION, SOLUTION INTRAVENOUS at 20:37

## 2018-01-01 RX ADMIN — DEXTROSE MONOHYDRATE: 50 INJECTION, SOLUTION INTRAVENOUS at 09:32

## 2018-01-01 RX ADMIN — IPRATROPIUM BROMIDE AND ALBUTEROL SULFATE 3 ML: .5; 3 SOLUTION RESPIRATORY (INHALATION) at 19:02

## 2018-01-01 RX ADMIN — OMEPRAZOLE 40 MG: 20 CAPSULE, DELAYED RELEASE ORAL at 07:55

## 2018-01-01 RX ADMIN — CHLORHEXIDINE GLUCONATE 15 ML: 1.2 RINSE ORAL at 09:04

## 2018-01-01 RX ADMIN — LEVETIRACETAM 500 MG: 100 SOLUTION ORAL at 04:15

## 2018-01-01 RX ADMIN — IPRATROPIUM BROMIDE AND ALBUTEROL SULFATE 3 ML: .5; 3 SOLUTION RESPIRATORY (INHALATION) at 22:49

## 2018-01-01 RX ADMIN — HEPARIN SODIUM 5000 UNITS: 5000 INJECTION, SOLUTION INTRAVENOUS; SUBCUTANEOUS at 20:30

## 2018-01-01 RX ADMIN — ALUMINUM HYDROXIDE, MAGNESIUM HYDROXIDE,SIMETHICONE 10 ML: 400; 400; 40 LIQUID ORAL at 17:13

## 2018-01-01 RX ADMIN — HEPARIN SODIUM 5000 UNITS: 5000 INJECTION, SOLUTION INTRAVENOUS; SUBCUTANEOUS at 08:58

## 2018-01-01 RX ADMIN — FENTANYL CITRATE 100 MCG: 50 INJECTION, SOLUTION INTRAMUSCULAR; INTRAVENOUS at 18:27

## 2018-01-01 RX ADMIN — ALUMINUM HYDROXIDE, MAGNESIUM HYDROXIDE,SIMETHICONE 10 ML: 400; 400; 40 LIQUID ORAL at 09:27

## 2018-01-01 RX ADMIN — GEMFIBROZIL 600 MG: 600 TABLET ORAL at 23:09

## 2018-01-01 RX ADMIN — INSULIN HUMAN 2 UNITS: 100 INJECTION, SOLUTION PARENTERAL at 11:41

## 2018-01-01 RX ADMIN — LORAZEPAM 2 MG: 2 INJECTION INTRAMUSCULAR; INTRAVENOUS at 03:36

## 2018-01-01 RX ADMIN — IPRATROPIUM BROMIDE AND ALBUTEROL SULFATE 3 ML: .5; 3 SOLUTION RESPIRATORY (INHALATION) at 19:03

## 2018-01-01 RX ADMIN — HYDROCORTISONE SODIUM SUCCINATE 50 MG: 100 INJECTION, POWDER, FOR SOLUTION INTRAMUSCULAR; INTRAVENOUS at 20:38

## 2018-01-01 RX ADMIN — AZITHROMYCIN 250 MG: 250 TABLET, FILM COATED ORAL at 09:06

## 2018-01-01 RX ADMIN — HEPARIN SODIUM 5000 UNITS: 5000 INJECTION, SOLUTION INTRAVENOUS; SUBCUTANEOUS at 05:18

## 2018-01-01 RX ADMIN — INSULIN HUMAN 2 UNITS: 100 INJECTION, SOLUTION PARENTERAL at 23:37

## 2018-01-01 RX ADMIN — FAMOTIDINE 20 MG: 20 TABLET ORAL at 09:14

## 2018-01-01 RX ADMIN — HEPARIN SODIUM 5000 UNITS: 5000 INJECTION, SOLUTION INTRAVENOUS; SUBCUTANEOUS at 06:04

## 2018-01-01 RX ADMIN — ROCURONIUM BROMIDE: 10 INJECTION, SOLUTION INTRAVENOUS at 12:18

## 2018-01-01 RX ADMIN — HEPARIN SODIUM 3000 UNITS: 1000 INJECTION, SOLUTION INTRAVENOUS; SUBCUTANEOUS at 08:50

## 2018-01-01 RX ADMIN — HEPARIN SODIUM 5000 UNITS: 5000 INJECTION, SOLUTION INTRAVENOUS; SUBCUTANEOUS at 14:31

## 2018-01-01 RX ADMIN — INSULIN HUMAN 2 UNITS: 100 INJECTION, SOLUTION PARENTERAL at 00:27

## 2018-01-01 RX ADMIN — LORAZEPAM 1 MG: 2 INJECTION INTRAMUSCULAR; INTRAVENOUS at 04:05

## 2018-01-01 RX ADMIN — AMLODIPINE BESYLATE 10 MG: 10 TABLET ORAL at 07:33

## 2018-01-01 RX ADMIN — ALUMINUM HYDROXIDE, MAGNESIUM HYDROXIDE,SIMETHICONE 10 ML: 400; 400; 40 LIQUID ORAL at 13:08

## 2018-01-01 RX ADMIN — GEMFIBROZIL 600 MG: 600 TABLET ORAL at 08:00

## 2018-01-01 RX ADMIN — PROPOFOL 50 MCG/KG/MIN: 10 INJECTION, EMULSION INTRAVENOUS at 19:07

## 2018-01-01 RX ADMIN — NOREPINEPHRINE BITARTRATE 20 MCG/MIN: 1 INJECTION INTRAVENOUS at 17:00

## 2018-01-01 RX ADMIN — LEVETIRACETAM 1000 MG: 100 SOLUTION ORAL at 21:16

## 2018-01-01 RX ADMIN — SENNOSIDES AND DOCUSATE SODIUM 2 TABLET: 8.6; 5 TABLET ORAL at 08:58

## 2018-01-01 RX ADMIN — ALUMINUM HYDROXIDE, MAGNESIUM HYDROXIDE,SIMETHICONE 10 ML: 400; 400; 40 LIQUID ORAL at 17:02

## 2018-01-01 RX ADMIN — ALUMINUM HYDROXIDE, MAGNESIUM HYDROXIDE,SIMETHICONE 10 ML: 400; 400; 40 LIQUID ORAL at 10:25

## 2018-01-01 RX ADMIN — INSULIN HUMAN 3 UNITS: 100 INJECTION, SOLUTION PARENTERAL at 00:08

## 2018-01-01 RX ADMIN — GEMFIBROZIL 600 MG: 600 TABLET ORAL at 21:09

## 2018-01-01 RX ADMIN — AZITHROMYCIN 250 MG: 250 TABLET, FILM COATED ORAL at 09:04

## 2018-01-01 RX ADMIN — LORAZEPAM 2 MG: 2 INJECTION INTRAMUSCULAR; INTRAVENOUS at 00:37

## 2018-01-01 RX ADMIN — ALUMINUM HYDROXIDE, MAGNESIUM HYDROXIDE,SIMETHICONE 10 ML: 400; 400; 40 LIQUID ORAL at 12:56

## 2018-01-01 RX ADMIN — HALOPERIDOL LACTATE 1 MG: 5 INJECTION, SOLUTION INTRAMUSCULAR at 18:12

## 2018-01-01 RX ADMIN — LORAZEPAM 2 MG: 2 INJECTION INTRAMUSCULAR; INTRAVENOUS at 09:40

## 2018-01-01 RX ADMIN — IPRATROPIUM BROMIDE AND ALBUTEROL SULFATE 3 ML: .5; 3 SOLUTION RESPIRATORY (INHALATION) at 03:00

## 2018-01-01 RX ADMIN — SENNOSIDES AND DOCUSATE SODIUM 2 TABLET: 8.6; 5 TABLET ORAL at 09:00

## 2018-01-01 RX ADMIN — PANTOPRAZOLE SODIUM 40 MG: 40 INJECTION, POWDER, LYOPHILIZED, FOR SOLUTION INTRAVENOUS at 09:00

## 2018-01-01 RX ADMIN — LORAZEPAM 2 MG: 2 INJECTION INTRAMUSCULAR; INTRAVENOUS at 02:35

## 2018-01-01 RX ADMIN — IPRATROPIUM BROMIDE AND ALBUTEROL SULFATE 3 ML: .5; 3 SOLUTION RESPIRATORY (INHALATION) at 19:22

## 2018-01-01 RX ADMIN — SENNOSIDES AND DOCUSATE SODIUM 2 TABLET: 8.6; 5 TABLET ORAL at 04:14

## 2018-01-01 RX ADMIN — HEPARIN SODIUM 5000 UNITS: 5000 INJECTION, SOLUTION INTRAVENOUS; SUBCUTANEOUS at 05:22

## 2018-01-01 RX ADMIN — SODIUM CHLORIDE 250 ML: 9 INJECTION, SOLUTION INTRAVENOUS at 05:23

## 2018-01-01 RX ADMIN — HEPARIN SODIUM 5000 UNITS: 5000 INJECTION, SOLUTION INTRAVENOUS; SUBCUTANEOUS at 05:27

## 2018-01-01 RX ADMIN — INSULIN HUMAN 2 UNITS: 100 INJECTION, SOLUTION PARENTERAL at 05:58

## 2018-01-01 RX ADMIN — ACETAMINOPHEN 650 MG: 325 TABLET, FILM COATED ORAL at 19:37

## 2018-01-01 RX ADMIN — ASPIRIN 81 MG: 81 TABLET, CHEWABLE ORAL at 22:59

## 2018-01-01 RX ADMIN — HEPARIN SODIUM 5000 UNITS: 5000 INJECTION, SOLUTION INTRAVENOUS; SUBCUTANEOUS at 22:24

## 2018-01-01 RX ADMIN — SENNOSIDES AND DOCUSATE SODIUM 2 TABLET: 8.6; 5 TABLET ORAL at 20:10

## 2018-01-01 RX ADMIN — IPRATROPIUM BROMIDE AND ALBUTEROL SULFATE 3 ML: .5; 3 SOLUTION RESPIRATORY (INHALATION) at 18:47

## 2018-01-01 RX ADMIN — SENNOSIDES AND DOCUSATE SODIUM 2 TABLET: 8.6; 5 TABLET ORAL at 09:14

## 2018-01-01 RX ADMIN — LEVETIRACETAM 500 MG: 100 SOLUTION ORAL at 23:10

## 2018-01-01 RX ADMIN — ALUMINUM HYDROXIDE, MAGNESIUM HYDROXIDE,SIMETHICONE 10 ML: 400; 400; 40 LIQUID ORAL at 20:40

## 2018-01-01 RX ADMIN — HALOPERIDOL LACTATE 1 MG: 5 INJECTION, SOLUTION INTRAMUSCULAR at 22:28

## 2018-01-01 RX ADMIN — HEPARIN SODIUM 5000 UNITS: 5000 INJECTION, SOLUTION INTRAVENOUS; SUBCUTANEOUS at 23:46

## 2018-01-01 RX ADMIN — INSULIN GLARGINE 10 UNITS: 100 INJECTION, SOLUTION SUBCUTANEOUS at 20:40

## 2018-01-01 RX ADMIN — HEPARIN SODIUM 5000 UNITS: 5000 INJECTION, SOLUTION INTRAVENOUS; SUBCUTANEOUS at 14:53

## 2018-01-01 RX ADMIN — ACETAMINOPHEN 650 MG: 325 TABLET, FILM COATED ORAL at 22:39

## 2018-01-01 RX ADMIN — EPOETIN ALFA 3000 UNITS: 3000 SOLUTION INTRAVENOUS; SUBCUTANEOUS at 08:57

## 2018-01-01 RX ADMIN — HEPARIN SODIUM 5000 UNITS: 5000 INJECTION, SOLUTION INTRAVENOUS; SUBCUTANEOUS at 05:44

## 2018-01-01 RX ADMIN — EPINEPHRINE 5 MCG/MIN: 1 INJECTION, SOLUTION INTRAMUSCULAR; SUBCUTANEOUS at 02:55

## 2018-01-01 RX ADMIN — FENTANYL CITRATE 100 MCG: 50 INJECTION, SOLUTION INTRAMUSCULAR; INTRAVENOUS at 23:12

## 2018-01-01 RX ADMIN — INSULIN GLARGINE 10 UNITS: 100 INJECTION, SOLUTION SUBCUTANEOUS at 20:56

## 2018-01-01 RX ADMIN — SENNOSIDES AND DOCUSATE SODIUM 2 TABLET: 8.6; 5 TABLET ORAL at 20:33

## 2018-01-01 RX ADMIN — HEPARIN SODIUM 5000 UNITS: 5000 INJECTION, SOLUTION INTRAVENOUS; SUBCUTANEOUS at 17:05

## 2018-01-01 RX ADMIN — VASOPRESSIN 0.03 UNITS/MIN: 20 INJECTION INTRAVENOUS at 19:42

## 2018-01-01 RX ADMIN — LEVETIRACETAM 1000 MG: 100 SOLUTION ORAL at 09:13

## 2018-01-01 RX ADMIN — ACETAMINOPHEN 650 MG: 325 TABLET, FILM COATED ORAL at 01:57

## 2018-01-01 RX ADMIN — SODIUM CHLORIDE, POTASSIUM CHLORIDE, SODIUM LACTATE AND CALCIUM CHLORIDE 2000 ML: 600; 310; 30; 20 INJECTION, SOLUTION INTRAVENOUS at 02:45

## 2018-01-01 RX ADMIN — LORAZEPAM 1 MG: 2 INJECTION INTRAMUSCULAR; INTRAVENOUS at 19:28

## 2018-01-01 RX ADMIN — DEXMEDETOMIDINE HYDROCHLORIDE 0.4 MCG/KG/HR: 100 INJECTION, SOLUTION INTRAVENOUS at 13:42

## 2018-01-01 RX ADMIN — DEXMEDETOMIDINE HYDROCHLORIDE 1.5 MCG/KG/HR: 100 INJECTION, SOLUTION INTRAVENOUS at 14:15

## 2018-01-01 RX ADMIN — SODIUM CHLORIDE: 4.5 INJECTION, SOLUTION INTRAVENOUS at 03:00

## 2018-01-01 RX ADMIN — GEMFIBROZIL 300 MG: 600 TABLET ORAL at 23:09

## 2018-01-01 RX ADMIN — HYDROCORTISONE SODIUM SUCCINATE 50 MG: 100 INJECTION, POWDER, FOR SOLUTION INTRAMUSCULAR; INTRAVENOUS at 20:33

## 2018-01-01 RX ADMIN — ALUMINUM HYDROXIDE, MAGNESIUM HYDROXIDE,SIMETHICONE 10 ML: 400; 400; 40 LIQUID ORAL at 17:27

## 2018-01-01 RX ADMIN — GEMFIBROZIL 600 MG: 600 TABLET ORAL at 12:31

## 2018-01-01 RX ADMIN — VASOPRESSIN 0.03 UNITS/MIN: 20 INJECTION INTRAVENOUS at 18:38

## 2018-01-01 RX ADMIN — ALPRAZOLAM 1 MG: 0.5 TABLET ORAL at 22:12

## 2018-01-01 RX ADMIN — ONDANSETRON HYDROCHLORIDE 4 MG: 2 INJECTION, SOLUTION INTRAMUSCULAR; INTRAVENOUS at 20:12

## 2018-01-01 RX ADMIN — SODIUM CHLORIDE: 9 INJECTION, SOLUTION INTRAVENOUS at 14:30

## 2018-01-01 RX ADMIN — IPRATROPIUM BROMIDE AND ALBUTEROL SULFATE 3 ML: .5; 3 SOLUTION RESPIRATORY (INHALATION) at 06:41

## 2018-01-01 RX ADMIN — AMPICILLIN SODIUM AND SULBACTAM SODIUM 3 G: 2; 1 INJECTION, POWDER, FOR SOLUTION INTRAMUSCULAR; INTRAVENOUS at 13:30

## 2018-01-01 RX ADMIN — HEPARIN SODIUM 5000 UNITS: 5000 INJECTION, SOLUTION INTRAVENOUS; SUBCUTANEOUS at 22:29

## 2018-01-01 RX ADMIN — IPRATROPIUM BROMIDE AND ALBUTEROL SULFATE 3 ML: .5; 3 SOLUTION RESPIRATORY (INHALATION) at 14:21

## 2018-01-01 RX ADMIN — ALUMINUM HYDROXIDE, MAGNESIUM HYDROXIDE,SIMETHICONE 10 ML: 400; 400; 40 LIQUID ORAL at 23:34

## 2018-01-01 RX ADMIN — HYDROCORTISONE SODIUM SUCCINATE 100 MG: 100 INJECTION, POWDER, FOR SOLUTION INTRAMUSCULAR; INTRAVENOUS at 18:09

## 2018-01-01 RX ADMIN — HEPARIN SODIUM 5000 UNITS: 5000 INJECTION, SOLUTION INTRAVENOUS; SUBCUTANEOUS at 20:10

## 2018-01-01 RX ADMIN — GEMFIBROZIL 300 MG: 600 TABLET ORAL at 20:10

## 2018-01-01 RX ADMIN — SENNOSIDES AND DOCUSATE SODIUM 2 TABLET: 8.6; 5 TABLET ORAL at 08:00

## 2018-01-01 RX ADMIN — CHLORHEXIDINE GLUCONATE 15 ML: 1.2 RINSE ORAL at 20:32

## 2018-01-01 RX ADMIN — GEMFIBROZIL 600 MG: 600 TABLET ORAL at 11:50

## 2018-01-01 RX ADMIN — AMLODIPINE BESYLATE 5 MG: 5 TABLET ORAL at 22:14

## 2018-01-01 RX ADMIN — AMLODIPINE BESYLATE 10 MG: 10 TABLET ORAL at 19:36

## 2018-01-01 RX ADMIN — SENNOSIDES AND DOCUSATE SODIUM 2 TABLET: 8.6; 5 TABLET ORAL at 20:12

## 2018-01-01 RX ADMIN — HEPARIN SODIUM 3000 UNITS: 1000 INJECTION, SOLUTION INTRAVENOUS; SUBCUTANEOUS at 10:36

## 2018-01-01 RX ADMIN — HYDROCORTISONE SODIUM SUCCINATE 50 MG: 100 INJECTION, POWDER, FOR SOLUTION INTRAMUSCULAR; INTRAVENOUS at 05:44

## 2018-01-01 RX ADMIN — LEVETIRACETAM 500 MG: 100 SOLUTION ORAL at 05:56

## 2018-01-01 RX ADMIN — DEXTROSE MONOHYDRATE: 50 INJECTION, SOLUTION INTRAVENOUS at 23:24

## 2018-01-01 RX ADMIN — LEVETIRACETAM 500 MG: 100 SOLUTION ORAL at 19:35

## 2018-01-01 RX ADMIN — IPRATROPIUM BROMIDE AND ALBUTEROL SULFATE 3 ML: .5; 3 SOLUTION RESPIRATORY (INHALATION) at 11:25

## 2018-01-01 RX ADMIN — LORAZEPAM 1 MG: 2 INJECTION INTRAMUSCULAR; INTRAVENOUS at 06:20

## 2018-01-01 RX ADMIN — HYDROCORTISONE SODIUM SUCCINATE 100 MG: 100 INJECTION, POWDER, FOR SOLUTION INTRAMUSCULAR; INTRAVENOUS at 14:00

## 2018-01-01 RX ADMIN — HEPARIN SODIUM 5000 UNITS: 5000 INJECTION, SOLUTION INTRAVENOUS; SUBCUTANEOUS at 12:40

## 2018-01-01 RX ADMIN — AMLODIPINE BESYLATE 10 MG: 10 TABLET ORAL at 09:03

## 2018-01-01 RX ADMIN — LEVETIRACETAM 500 MG: 100 SOLUTION ORAL at 05:21

## 2018-01-01 RX ADMIN — SENNOSIDES AND DOCUSATE SODIUM 2 TABLET: 8.6; 5 TABLET ORAL at 08:40

## 2018-01-01 RX ADMIN — FAMOTIDINE 20 MG: 20 TABLET ORAL at 08:18

## 2018-01-01 RX ADMIN — PROPOFOL 25 MCG/KG/MIN: 10 INJECTION, EMULSION INTRAVENOUS at 21:11

## 2018-01-01 RX ADMIN — IPRATROPIUM BROMIDE AND ALBUTEROL SULFATE 3 ML: .5; 3 SOLUTION RESPIRATORY (INHALATION) at 06:25

## 2018-01-01 RX ADMIN — ONDANSETRON HYDROCHLORIDE 4 MG: 2 INJECTION, SOLUTION INTRAMUSCULAR; INTRAVENOUS at 02:27

## 2018-01-01 RX ADMIN — PROPOFOL 35 MCG/KG/MIN: 10 INJECTION, EMULSION INTRAVENOUS at 03:41

## 2018-01-01 RX ADMIN — SODIUM CHLORIDE, POTASSIUM CHLORIDE, SODIUM LACTATE AND CALCIUM CHLORIDE: 600; 310; 30; 20 INJECTION, SOLUTION INTRAVENOUS at 06:29

## 2018-01-01 RX ADMIN — HEPARIN SODIUM 5000 UNITS: 5000 INJECTION, SOLUTION INTRAVENOUS; SUBCUTANEOUS at 21:16

## 2018-01-01 RX ADMIN — SODIUM CHLORIDE: 4.5 INJECTION, SOLUTION INTRAVENOUS at 23:17

## 2018-01-01 RX ADMIN — LEVETIRACETAM 500 MG: 100 SOLUTION ORAL at 08:00

## 2018-01-01 RX ADMIN — SENNOSIDES AND DOCUSATE SODIUM 2 TABLET: 8.6; 5 TABLET ORAL at 05:54

## 2018-01-01 RX ADMIN — DEXMEDETOMIDINE HYDROCHLORIDE 1.5 MCG/KG/HR: 100 INJECTION, SOLUTION INTRAVENOUS at 08:51

## 2018-01-01 RX ADMIN — HALOPERIDOL LACTATE 2 MG: 5 INJECTION, SOLUTION INTRAMUSCULAR at 21:05

## 2018-01-01 RX ADMIN — POTASSIUM CHLORIDE 20 MEQ: 200 INJECTION, SOLUTION INTRAVENOUS at 11:01

## 2018-01-01 RX ADMIN — PROPOFOL 25 MCG/KG/MIN: 10 INJECTION, EMULSION INTRAVENOUS at 11:40

## 2018-01-01 RX ADMIN — HEPARIN SODIUM 5000 UNITS: 5000 INJECTION, SOLUTION INTRAVENOUS; SUBCUTANEOUS at 05:59

## 2018-01-01 RX ADMIN — ALUMINUM HYDROXIDE, MAGNESIUM HYDROXIDE,SIMETHICONE 10 ML: 400; 400; 40 LIQUID ORAL at 21:51

## 2018-01-01 RX ADMIN — HEPARIN SODIUM 5000 UNITS: 5000 INJECTION, SOLUTION INTRAVENOUS; SUBCUTANEOUS at 14:23

## 2018-01-01 RX ADMIN — LEVETIRACETAM 1000 MG: 100 SOLUTION ORAL at 12:06

## 2018-01-01 RX ADMIN — FENTANYL CITRATE 25 MCG: 50 INJECTION, SOLUTION INTRAMUSCULAR; INTRAVENOUS at 03:09

## 2018-01-01 RX ADMIN — PANTOPRAZOLE SODIUM 40 MG: 40 INJECTION, POWDER, LYOPHILIZED, FOR SOLUTION INTRAVENOUS at 08:44

## 2018-01-01 RX ADMIN — FENTANYL CITRATE 50 MCG: 50 INJECTION INTRAMUSCULAR; INTRAVENOUS at 23:01

## 2018-01-01 RX ADMIN — MORPHINE SULFATE 4 MG: 4 INJECTION INTRAVENOUS at 03:03

## 2018-01-01 RX ADMIN — LEVETIRACETAM 500 MG: 100 SOLUTION ORAL at 20:12

## 2018-01-01 RX ADMIN — AMLODIPINE BESYLATE 10 MG: 10 TABLET ORAL at 08:00

## 2018-01-01 RX ADMIN — LEVETIRACETAM 500 MG: 100 SOLUTION ORAL at 17:37

## 2018-01-01 RX ADMIN — HYDROCORTISONE SODIUM SUCCINATE 50 MG: 100 INJECTION, POWDER, FOR SOLUTION INTRAMUSCULAR; INTRAVENOUS at 05:07

## 2018-01-01 RX ADMIN — ALUMINUM HYDROXIDE, MAGNESIUM HYDROXIDE,SIMETHICONE 10 ML: 400; 400; 40 LIQUID ORAL at 13:17

## 2018-01-01 RX ADMIN — GEMFIBROZIL 600 MG: 600 TABLET ORAL at 09:03

## 2018-01-01 RX ADMIN — LORAZEPAM 2 MG: 2 INJECTION INTRAMUSCULAR; INTRAVENOUS at 20:17

## 2018-01-01 RX ADMIN — SODIUM BICARBONATE 100 MEQ: 84 INJECTION, SOLUTION INTRAVENOUS at 02:00

## 2018-01-01 RX ADMIN — HYDROCORTISONE SODIUM SUCCINATE 100 MG: 100 INJECTION, POWDER, FOR SOLUTION INTRAMUSCULAR; INTRAVENOUS at 05:50

## 2018-01-01 RX ADMIN — HEPARIN SODIUM 5000 UNITS: 5000 INJECTION, SOLUTION INTRAVENOUS; SUBCUTANEOUS at 15:07

## 2018-01-01 RX ADMIN — DEXMEDETOMIDINE HYDROCHLORIDE 1.5 MCG/KG/HR: 100 INJECTION, SOLUTION INTRAVENOUS at 00:00

## 2018-01-01 RX ADMIN — MORPHINE SULFATE 4 MG: 4 INJECTION INTRAVENOUS at 20:18

## 2018-01-01 RX ADMIN — AMPICILLIN SODIUM AND SULBACTAM SODIUM 3 G: 2; 1 INJECTION, POWDER, FOR SOLUTION INTRAMUSCULAR; INTRAVENOUS at 15:02

## 2018-01-01 RX ADMIN — SODIUM CHLORIDE 1000 MG: 9 INJECTION, SOLUTION INTRAVENOUS at 13:04

## 2018-01-01 RX ADMIN — DEXMEDETOMIDINE HYDROCHLORIDE 1.4 MCG/KG/HR: 100 INJECTION, SOLUTION INTRAVENOUS at 17:59

## 2018-01-01 RX ADMIN — GEMFIBROZIL 600 MG: 600 TABLET ORAL at 11:26

## 2018-01-01 RX ADMIN — SODIUM CHLORIDE 1000 MG: 9 INJECTION, SOLUTION INTRAVENOUS at 23:12

## 2018-01-01 RX ADMIN — SENNOSIDES AND DOCUSATE SODIUM 2 TABLET: 8.6; 5 TABLET ORAL at 09:03

## 2018-01-01 RX ADMIN — CHLORHEXIDINE GLUCONATE 15 ML: 1.2 RINSE ORAL at 20:38

## 2018-01-01 RX ADMIN — INSULIN HUMAN 3 UNITS: 100 INJECTION, SOLUTION PARENTERAL at 16:21

## 2018-01-01 RX ADMIN — SENNOSIDES AND DOCUSATE SODIUM 2 TABLET: 8.6; 5 TABLET ORAL at 22:13

## 2018-01-01 RX ADMIN — IPRATROPIUM BROMIDE AND ALBUTEROL SULFATE 3 ML: .5; 3 SOLUTION RESPIRATORY (INHALATION) at 11:31

## 2018-01-01 RX ADMIN — AMPICILLIN SODIUM AND SULBACTAM SODIUM 3 G: 2; 1 INJECTION, POWDER, FOR SOLUTION INTRAMUSCULAR; INTRAVENOUS at 03:10

## 2018-01-01 RX ADMIN — HEPARIN SODIUM 5000 UNITS: 5000 INJECTION, SOLUTION INTRAVENOUS; SUBCUTANEOUS at 04:05

## 2018-01-01 RX ADMIN — SENNOSIDES AND DOCUSATE SODIUM 2 TABLET: 8.6; 5 TABLET ORAL at 20:40

## 2018-01-01 RX ADMIN — DEXMEDETOMIDINE HYDROCHLORIDE 1.3 MCG/KG/HR: 100 INJECTION, SOLUTION INTRAVENOUS at 11:24

## 2018-01-01 RX ADMIN — AZITHROMYCIN 250 MG: 250 TABLET, FILM COATED ORAL at 07:55

## 2018-01-01 RX ADMIN — MIDAZOLAM 2 MG: 1 INJECTION INTRAMUSCULAR; INTRAVENOUS at 02:00

## 2018-01-01 RX ADMIN — CHLORHEXIDINE GLUCONATE 15 ML: 1.2 RINSE ORAL at 21:06

## 2018-01-01 RX ADMIN — HEPARIN SODIUM 5000 UNITS: 5000 INJECTION, SOLUTION INTRAVENOUS; SUBCUTANEOUS at 05:32

## 2018-01-01 RX ADMIN — POLYETHYLENE GLYCOL 3350 1 PACKET: 17 POWDER, FOR SOLUTION ORAL at 21:16

## 2018-01-01 RX ADMIN — DEXTROSE MONOHYDRATE 50 ML: 25 INJECTION, SOLUTION INTRAVENOUS at 02:15

## 2018-01-01 RX ADMIN — CHLORHEXIDINE GLUCONATE 15 ML: 1.2 RINSE ORAL at 09:00

## 2018-01-01 RX ADMIN — SENNOSIDES AND DOCUSATE SODIUM 2 TABLET: 8.6; 5 TABLET ORAL at 19:35

## 2018-01-01 RX ADMIN — GEMFIBROZIL 600 MG: 600 TABLET ORAL at 20:31

## 2018-01-01 RX ADMIN — HEPARIN SODIUM 5000 UNITS: 5000 INJECTION, SOLUTION INTRAVENOUS; SUBCUTANEOUS at 05:54

## 2018-01-01 RX ADMIN — IPRATROPIUM BROMIDE AND ALBUTEROL SULFATE 3 ML: .5; 3 SOLUTION RESPIRATORY (INHALATION) at 22:42

## 2018-01-01 RX ADMIN — MORPHINE SULFATE 2 MG: 4 INJECTION INTRAVENOUS at 00:23

## 2018-01-01 RX ADMIN — INSULIN HUMAN 3 UNITS: 100 INJECTION, SOLUTION PARENTERAL at 12:00

## 2018-01-01 RX ADMIN — SODIUM CHLORIDE: 4.5 INJECTION, SOLUTION INTRAVENOUS at 05:35

## 2018-01-01 RX ADMIN — IPRATROPIUM BROMIDE AND ALBUTEROL SULFATE 3 ML: .5; 3 SOLUTION RESPIRATORY (INHALATION) at 15:07

## 2018-01-01 RX ADMIN — INSULIN HUMAN 2 UNITS: 100 INJECTION, SOLUTION PARENTERAL at 11:56

## 2018-01-01 RX ADMIN — DEXMEDETOMIDINE HYDROCHLORIDE 1.5 MCG/KG/HR: 100 INJECTION, SOLUTION INTRAVENOUS at 10:56

## 2018-01-01 RX ADMIN — DEXMEDETOMIDINE HYDROCHLORIDE 1.5 MCG/KG/HR: 100 INJECTION, SOLUTION INTRAVENOUS at 03:00

## 2018-01-01 RX ADMIN — CHLORHEXIDINE GLUCONATE 15 ML: 1.2 RINSE ORAL at 07:55

## 2018-01-01 RX ADMIN — ALUMINUM HYDROXIDE, MAGNESIUM HYDROXIDE,SIMETHICONE 10 ML: 400; 400; 40 LIQUID ORAL at 18:12

## 2018-01-01 RX ADMIN — ATROPINE SULFATE 1 MG: 0.1 INJECTION, SOLUTION INTRAVENOUS at 02:15

## 2018-01-01 RX ADMIN — INSULIN HUMAN 2 UNITS: 100 INJECTION, SOLUTION PARENTERAL at 18:24

## 2018-01-01 RX ADMIN — HEPARIN SODIUM 5000 UNITS: 5000 INJECTION, SOLUTION INTRAVENOUS; SUBCUTANEOUS at 05:30

## 2018-01-01 RX ADMIN — POTASSIUM CHLORIDE 10 MEQ: 7.46 INJECTION, SOLUTION INTRAVENOUS at 22:11

## 2018-01-01 RX ADMIN — IPRATROPIUM BROMIDE AND ALBUTEROL SULFATE 3 ML: .5; 3 SOLUTION RESPIRATORY (INHALATION) at 03:05

## 2018-01-01 RX ADMIN — FENTANYL CITRATE 50 MCG: 50 INJECTION, SOLUTION INTRAMUSCULAR; INTRAVENOUS at 01:02

## 2018-01-01 RX ADMIN — SODIUM CHLORIDE: 4.5 INJECTION, SOLUTION INTRAVENOUS at 18:16

## 2018-01-01 RX ADMIN — IPRATROPIUM BROMIDE AND ALBUTEROL SULFATE 3 ML: .5; 3 SOLUTION RESPIRATORY (INHALATION) at 14:55

## 2018-01-01 RX ADMIN — PROPOFOL 20 MCG/KG/MIN: 10 INJECTION, EMULSION INTRAVENOUS at 09:16

## 2018-01-01 RX ADMIN — HYDROCORTISONE SODIUM SUCCINATE 25 MG: 100 INJECTION, POWDER, FOR SOLUTION INTRAMUSCULAR; INTRAVENOUS at 05:45

## 2018-01-01 RX ADMIN — SODIUM CHLORIDE 1000 MG: 9 INJECTION, SOLUTION INTRAVENOUS at 01:21

## 2018-01-01 RX ADMIN — AMLODIPINE BESYLATE 5 MG: 5 TABLET ORAL at 05:20

## 2018-01-01 RX ADMIN — PROPOFOL 15 MCG/KG/MIN: 10 INJECTION, EMULSION INTRAVENOUS at 04:46

## 2018-01-01 RX ADMIN — HEPARIN SODIUM 5000 UNITS: 5000 INJECTION, SOLUTION INTRAVENOUS; SUBCUTANEOUS at 06:02

## 2018-01-01 RX ADMIN — HEPARIN SODIUM 5000 UNITS: 5000 INJECTION, SOLUTION INTRAVENOUS; SUBCUTANEOUS at 20:32

## 2018-01-01 RX ADMIN — INSULIN HUMAN 3 UNITS: 100 INJECTION, SOLUTION PARENTERAL at 18:07

## 2018-01-01 RX ADMIN — HEPARIN SODIUM 5000 UNITS: 5000 INJECTION, SOLUTION INTRAVENOUS; SUBCUTANEOUS at 20:18

## 2018-01-01 RX ADMIN — IPRATROPIUM BROMIDE AND ALBUTEROL SULFATE 3 ML: .5; 3 SOLUTION RESPIRATORY (INHALATION) at 11:22

## 2018-01-01 RX ADMIN — SENNOSIDES AND DOCUSATE SODIUM 2 TABLET: 8.6; 5 TABLET ORAL at 17:39

## 2018-01-01 RX ADMIN — ALTEPLASE 2 MG: 2.2 INJECTION, POWDER, LYOPHILIZED, FOR SOLUTION INTRAVENOUS at 12:02

## 2018-01-01 RX ADMIN — IPRATROPIUM BROMIDE AND ALBUTEROL SULFATE 3 ML: .5; 3 SOLUTION RESPIRATORY (INHALATION) at 14:34

## 2018-01-01 RX ADMIN — DEXTROSE MONOHYDRATE: 50 INJECTION, SOLUTION INTRAVENOUS at 11:28

## 2018-01-01 RX ADMIN — INSULIN HUMAN 3 UNITS: 100 INJECTION, SOLUTION PARENTERAL at 05:15

## 2018-01-01 RX ADMIN — Medication 100 MCG: at 01:43

## 2018-01-01 RX ADMIN — IPRATROPIUM BROMIDE AND ALBUTEROL SULFATE 3 ML: .5; 3 SOLUTION RESPIRATORY (INHALATION) at 02:41

## 2018-01-01 RX ADMIN — LEVETIRACETAM 500 MG: 100 SOLUTION ORAL at 18:03

## 2018-01-01 RX ADMIN — IPRATROPIUM BROMIDE AND ALBUTEROL SULFATE 3 ML: .5; 3 SOLUTION RESPIRATORY (INHALATION) at 23:19

## 2018-01-01 RX ADMIN — FENTANYL CITRATE 100 MCG: 50 INJECTION, SOLUTION INTRAMUSCULAR; INTRAVENOUS at 05:21

## 2018-01-01 RX ADMIN — ROCURONIUM BROMIDE 100 MG: 10 INJECTION, SOLUTION INTRAVENOUS at 02:30

## 2018-01-01 RX ADMIN — GEMFIBROZIL 600 MG: 600 TABLET ORAL at 17:31

## 2018-01-01 RX ADMIN — GEMFIBROZIL 300 MG: 600 TABLET ORAL at 22:13

## 2018-01-01 RX ADMIN — ACETAMINOPHEN 650 MG: 325 TABLET, FILM COATED ORAL at 21:23

## 2018-01-01 RX ADMIN — FAMOTIDINE 20 MG: 20 TABLET ORAL at 09:06

## 2018-01-01 RX ADMIN — PROPOFOL 25 MCG/KG/MIN: 10 INJECTION, EMULSION INTRAVENOUS at 14:27

## 2018-01-01 RX ADMIN — INSULIN HUMAN 5 UNITS: 100 INJECTION, SOLUTION PARENTERAL at 11:20

## 2018-01-01 RX ADMIN — HYDROCORTISONE SODIUM SUCCINATE 50 MG: 100 INJECTION, POWDER, FOR SOLUTION INTRAMUSCULAR; INTRAVENOUS at 13:41

## 2018-01-01 RX ADMIN — INSULIN HUMAN 2 UNITS: 100 INJECTION, SOLUTION PARENTERAL at 18:00

## 2018-01-01 RX ADMIN — MORPHINE SULFATE 2 MG: 4 INJECTION INTRAVENOUS at 04:26

## 2018-01-01 RX ADMIN — DEXMEDETOMIDINE HYDROCHLORIDE 1.5 MCG/KG/HR: 100 INJECTION, SOLUTION INTRAVENOUS at 08:15

## 2018-01-01 RX ADMIN — INSULIN GLARGINE 10 UNITS: 100 INJECTION, SOLUTION SUBCUTANEOUS at 20:45

## 2018-01-01 RX ADMIN — SODIUM BICARBONATE 50 MEQ: 84 INJECTION, SOLUTION INTRAVENOUS at 08:59

## 2018-01-01 RX ADMIN — FENTANYL CITRATE 100 MCG: 50 INJECTION, SOLUTION INTRAMUSCULAR; INTRAVENOUS at 01:12

## 2018-01-01 RX ADMIN — GEMFIBROZIL 600 MG: 600 TABLET ORAL at 18:05

## 2018-01-01 RX ADMIN — IPRATROPIUM BROMIDE AND ALBUTEROL SULFATE 3 ML: .5; 3 SOLUTION RESPIRATORY (INHALATION) at 08:25

## 2018-01-01 RX ADMIN — LEVETIRACETAM 500 MG: 100 SOLUTION ORAL at 07:35

## 2018-01-01 RX ADMIN — ALUMINUM HYDROXIDE, MAGNESIUM HYDROXIDE,SIMETHICONE 10 ML: 400; 400; 40 LIQUID ORAL at 11:05

## 2018-01-01 RX ADMIN — ASPIRIN 81 MG: 81 TABLET, CHEWABLE ORAL at 08:00

## 2018-01-01 RX ADMIN — PROPOFOL 25 MCG/KG/MIN: 10 INJECTION, EMULSION INTRAVENOUS at 00:05

## 2018-01-01 RX ADMIN — IPRATROPIUM BROMIDE AND ALBUTEROL SULFATE 3 ML: .5; 3 SOLUTION RESPIRATORY (INHALATION) at 18:36

## 2018-01-01 RX ADMIN — BISACODYL 10 MG: 10 SUPPOSITORY RECTAL at 09:28

## 2018-01-01 RX ADMIN — NOREPINEPHRINE BITARTRATE 14 MCG/MIN: 1 INJECTION INTRAVENOUS at 01:42

## 2018-01-01 RX ADMIN — PANTOPRAZOLE SODIUM 40 MG: 40 INJECTION, POWDER, LYOPHILIZED, FOR SOLUTION INTRAVENOUS at 21:24

## 2018-01-01 RX ADMIN — AMPICILLIN SODIUM AND SULBACTAM SODIUM 3 G: 2; 1 INJECTION, POWDER, FOR SOLUTION INTRAMUSCULAR; INTRAVENOUS at 14:25

## 2018-01-01 RX ADMIN — LEVETIRACETAM 500 MG: 100 SOLUTION ORAL at 22:29

## 2018-01-01 RX ADMIN — ETOMIDATE 10 MG: 2 INJECTION INTRAVENOUS at 02:30

## 2018-01-01 RX ADMIN — SODIUM CHLORIDE: 4.5 INJECTION, SOLUTION INTRAVENOUS at 08:12

## 2018-01-01 RX ADMIN — HYDROCORTISONE SODIUM SUCCINATE 25 MG: 100 INJECTION, POWDER, FOR SOLUTION INTRAMUSCULAR; INTRAVENOUS at 20:32

## 2018-01-01 RX ADMIN — IPRATROPIUM BROMIDE AND ALBUTEROL SULFATE 3 ML: .5; 3 SOLUTION RESPIRATORY (INHALATION) at 06:52

## 2018-01-01 RX ADMIN — CHLORHEXIDINE GLUCONATE 15 ML: 1.2 RINSE ORAL at 08:44

## 2018-01-01 RX ADMIN — HEPARIN SODIUM 5000 UNITS: 5000 INJECTION, SOLUTION INTRAVENOUS; SUBCUTANEOUS at 23:10

## 2018-01-01 RX ADMIN — VASOPRESSIN 0.03 UNITS/MIN: 20 INJECTION INTRAVENOUS at 05:15

## 2018-01-01 RX ADMIN — AMLODIPINE BESYLATE 10 MG: 10 TABLET ORAL at 05:54

## 2018-01-01 RX ADMIN — ASPIRIN 81 MG: 81 TABLET, CHEWABLE ORAL at 08:41

## 2018-01-01 RX ADMIN — GEMFIBROZIL 600 MG: 600 TABLET ORAL at 11:32

## 2018-01-01 RX ADMIN — PROPOFOL 5 MCG/KG/MIN: 10 INJECTION, EMULSION INTRAVENOUS at 08:49

## 2018-01-01 RX ADMIN — IPRATROPIUM BROMIDE AND ALBUTEROL SULFATE 3 ML: .5; 3 SOLUTION RESPIRATORY (INHALATION) at 14:46

## 2018-01-01 RX ADMIN — GEMFIBROZIL 600 MG: 600 TABLET ORAL at 08:41

## 2018-01-01 RX ADMIN — SODIUM CHLORIDE, POTASSIUM CHLORIDE, SODIUM LACTATE AND CALCIUM CHLORIDE: 600; 310; 30; 20 INJECTION, SOLUTION INTRAVENOUS at 23:08

## 2018-01-01 RX ADMIN — FAMOTIDINE 20 MG: 20 TABLET ORAL at 05:21

## 2018-01-01 RX ADMIN — ALUMINUM HYDROXIDE, MAGNESIUM HYDROXIDE,SIMETHICONE 10 ML: 400; 400; 40 LIQUID ORAL at 18:00

## 2018-01-01 RX ADMIN — ACETAMINOPHEN 650 MG: 325 TABLET, FILM COATED ORAL at 22:13

## 2018-01-01 RX ADMIN — INSULIN HUMAN 3 UNITS: 100 INJECTION, SOLUTION PARENTERAL at 17:18

## 2018-01-01 RX ADMIN — EPOETIN ALFA 2000 UNITS: 2000 SOLUTION INTRAVENOUS; SUBCUTANEOUS at 08:57

## 2018-01-01 RX ADMIN — AMLODIPINE BESYLATE 10 MG: 10 TABLET ORAL at 08:58

## 2018-01-01 RX ADMIN — PROPOFOL 15 MCG/KG/MIN: 10 INJECTION, EMULSION INTRAVENOUS at 01:39

## 2018-01-01 RX ADMIN — MIDAZOLAM HYDROCHLORIDE 2 MG: 1 INJECTION, SOLUTION INTRAMUSCULAR; INTRAVENOUS at 03:08

## 2018-01-01 RX ADMIN — SENNOSIDES AND DOCUSATE SODIUM 2 TABLET: 8.6; 5 TABLET ORAL at 08:18

## 2018-01-01 RX ADMIN — CALCIUM CHLORIDE 1 G: 100 INJECTION, SOLUTION INTRAVENOUS at 01:30

## 2018-01-01 RX ADMIN — ACETAMINOPHEN 650 MG: 325 TABLET, FILM COATED ORAL at 16:59

## 2018-01-01 RX ADMIN — DIPHENHYDRAMINE HYDROCHLORIDE 25 MG: 50 INJECTION, SOLUTION INTRAMUSCULAR; INTRAVENOUS at 03:42

## 2018-01-01 RX ADMIN — DEXMEDETOMIDINE HYDROCHLORIDE 1.5 MCG/KG/HR: 100 INJECTION, SOLUTION INTRAVENOUS at 05:07

## 2018-01-01 RX ADMIN — SODIUM CHLORIDE: 4.5 INJECTION, SOLUTION INTRAVENOUS at 22:21

## 2018-01-01 RX ADMIN — LEVETIRACETAM 500 MG: 100 SOLUTION ORAL at 21:07

## 2018-01-01 RX ADMIN — AMLODIPINE BESYLATE 10 MG: 10 TABLET ORAL at 08:41

## 2018-01-01 RX ADMIN — IPRATROPIUM BROMIDE AND ALBUTEROL SULFATE 3 ML: .5; 3 SOLUTION RESPIRATORY (INHALATION) at 15:43

## 2018-01-01 RX ADMIN — AMPICILLIN SODIUM AND SULBACTAM SODIUM 3 G: 2; 1 INJECTION, POWDER, FOR SOLUTION INTRAMUSCULAR; INTRAVENOUS at 04:25

## 2018-01-01 RX ADMIN — INSULIN HUMAN 2 UNITS: 100 INJECTION, SOLUTION PARENTERAL at 06:16

## 2018-01-01 RX ADMIN — IPRATROPIUM BROMIDE AND ALBUTEROL SULFATE 3 ML: .5; 3 SOLUTION RESPIRATORY (INHALATION) at 03:27

## 2018-01-01 RX ADMIN — SODIUM CHLORIDE 1000 MG: 9 INJECTION, SOLUTION INTRAVENOUS at 13:31

## 2018-01-01 RX ADMIN — HEPARIN SODIUM 5000 UNITS: 5000 INJECTION, SOLUTION INTRAVENOUS; SUBCUTANEOUS at 20:40

## 2018-01-01 RX ADMIN — ALUMINUM HYDROXIDE, MAGNESIUM HYDROXIDE,SIMETHICONE 10 ML: 400; 400; 40 LIQUID ORAL at 03:41

## 2018-01-01 RX ADMIN — ALUMINUM HYDROXIDE, MAGNESIUM HYDROXIDE,SIMETHICONE 10 ML: 400; 400; 40 LIQUID ORAL at 15:47

## 2018-01-01 RX ADMIN — CHLORHEXIDINE GLUCONATE 15 ML: 1.2 RINSE ORAL at 08:56

## 2018-01-01 RX ADMIN — INSULIN HUMAN 2 UNITS: 100 INJECTION, SOLUTION PARENTERAL at 17:48

## 2018-01-01 RX ADMIN — IPRATROPIUM BROMIDE AND ALBUTEROL SULFATE 3 ML: .5; 3 SOLUTION RESPIRATORY (INHALATION) at 23:47

## 2018-01-01 RX ADMIN — INSULIN HUMAN 2 UNITS: 100 INJECTION, SOLUTION PARENTERAL at 11:14

## 2018-01-01 RX ADMIN — AZITHROMYCIN FOR INJECTION INJECTION, POWDER, LYOPHILIZED, FOR SOLUTION 500 MG: 500 INJECTION INTRAVENOUS at 04:12

## 2018-01-01 RX ADMIN — SODIUM CHLORIDE: 4.5 INJECTION, SOLUTION INTRAVENOUS at 12:35

## 2018-01-01 RX ADMIN — CHLORHEXIDINE GLUCONATE 15 ML: 1.2 RINSE ORAL at 20:33

## 2018-01-01 RX ADMIN — SODIUM CHLORIDE 500 ML: 9 INJECTION, SOLUTION INTRAVENOUS at 22:11

## 2018-01-01 RX ADMIN — INSULIN HUMAN 2 UNITS: 100 INJECTION, SOLUTION PARENTERAL at 12:00

## 2018-01-01 RX ADMIN — DEXMEDETOMIDINE HYDROCHLORIDE 1.5 MCG/KG/HR: 100 INJECTION, SOLUTION INTRAVENOUS at 05:35

## 2018-01-01 RX ADMIN — INSULIN HUMAN 2 UNITS: 100 INJECTION, SOLUTION PARENTERAL at 00:43

## 2018-01-01 RX ADMIN — SODIUM CHLORIDE: 4.5 INJECTION, SOLUTION INTRAVENOUS at 00:44

## 2018-01-01 ASSESSMENT — ENCOUNTER SYMPTOMS
CHILLS: 0
CHILLS: 0
SENSORY CHANGE: 0
HEADACHES: 0
CHILLS: 0
COUGH: 0
MUSCULOSKELETAL NEGATIVE: 1
DIAPHORESIS: 0
NERVOUS/ANXIOUS: 0
NERVOUS/ANXIOUS: 0
NAUSEA: 1
FEVER: 0
SPEECH CHANGE: 0
DIAPHORESIS: 0
EYES NEGATIVE: 1
NAUSEA: 1
HEADACHES: 0
SPEECH CHANGE: 0
INSOMNIA: 1
ABDOMINAL PAIN: 0
DIARRHEA: 0
FEVER: 0
SPUTUM PRODUCTION: 0
FEVER: 0
SHORTNESS OF BREATH: 1
VOMITING: 0
SHORTNESS OF BREATH: 0
ABDOMINAL PAIN: 0
SPUTUM PRODUCTION: 0
FOCAL WEAKNESS: 0
EYES NEGATIVE: 1
MUSCULOSKELETAL NEGATIVE: 1
FEVER: 0
SENSORY CHANGE: 0
COUGH: 0
CHILLS: 0
FOCAL WEAKNESS: 0
PALPITATIONS: 0
INSOMNIA: 1
SHORTNESS OF BREATH: 1
DIARRHEA: 0
VOMITING: 0
PALPITATIONS: 0
SHORTNESS OF BREATH: 0

## 2018-01-01 ASSESSMENT — COGNITIVE AND FUNCTIONAL STATUS - GENERAL
EATING MEALS: TOTAL
TOILETING: TOTAL
MOVING FROM LYING ON BACK TO SITTING ON SIDE OF FLAT BED: UNABLE
STANDING UP FROM CHAIR USING ARMS: TOTAL
STANDING UP FROM CHAIR USING ARMS: TOTAL
MOBILITY SCORE: 6
CLIMB 3 TO 5 STEPS WITH RAILING: TOTAL
SUGGESTED CMS G CODE MODIFIER DAILY ACTIVITY: CL
CLIMB 3 TO 5 STEPS WITH RAILING: TOTAL
DRESSING REGULAR LOWER BODY CLOTHING: TOTAL
SUGGESTED CMS G CODE MODIFIER MOBILITY: CN
DAILY ACTIVITIY SCORE: 13
SUGGESTED CMS G CODE MODIFIER DAILY ACTIVITY: CN
MOVING TO AND FROM BED TO CHAIR: UNABLE
MOVING FROM LYING ON BACK TO SITTING ON SIDE OF FLAT BED: UNABLE
DRESSING REGULAR UPPER BODY CLOTHING: A LITTLE
MOVING TO AND FROM BED TO CHAIR: UNABLE
DRESSING REGULAR LOWER BODY CLOTHING: A LOT
HELP NEEDED FOR BATHING: TOTAL
CLIMB 3 TO 5 STEPS WITH RAILING: TOTAL
WALKING IN HOSPITAL ROOM: TOTAL
TURNING FROM BACK TO SIDE WHILE IN FLAT BAD: A LOT
SUGGESTED CMS G CODE MODIFIER MOBILITY: CN
WALKING IN HOSPITAL ROOM: TOTAL
PERSONAL GROOMING: TOTAL
TURNING FROM BACK TO SIDE WHILE IN FLAT BAD: UNABLE
MOVING TO AND FROM BED TO CHAIR: UNABLE
TOILETING: A LOT
WALKING IN HOSPITAL ROOM: TOTAL
EATING MEALS: TOTAL
HELP NEEDED FOR BATHING: A LOT
STANDING UP FROM CHAIR USING ARMS: TOTAL
DRESSING REGULAR UPPER BODY CLOTHING: TOTAL
SUGGESTED CMS G CODE MODIFIER MOBILITY: CM
MOBILITY SCORE: 6
MOVING FROM LYING ON BACK TO SITTING ON SIDE OF FLAT BED: A LOT
DAILY ACTIVITIY SCORE: 6
PERSONAL GROOMING: A LITTLE
MOBILITY SCORE: 8
TURNING FROM BACK TO SIDE WHILE IN FLAT BAD: UNABLE

## 2018-01-01 ASSESSMENT — PATIENT HEALTH QUESTIONNAIRE - PHQ9
SUM OF ALL RESPONSES TO PHQ9 QUESTIONS 1 AND 2: 0
2. FEELING DOWN, DEPRESSED, IRRITABLE, OR HOPELESS: NOT AT ALL
1. LITTLE INTEREST OR PLEASURE IN DOING THINGS: NOT AT ALL
2. FEELING DOWN, DEPRESSED, IRRITABLE, OR HOPELESS: NOT AT ALL
SUM OF ALL RESPONSES TO PHQ9 QUESTIONS 1 AND 2: 0
1. LITTLE INTEREST OR PLEASURE IN DOING THINGS: NOT AT ALL

## 2018-01-01 ASSESSMENT — PAIN SCALES - GENERAL
PAINLEVEL_OUTOF10: 0
PAINLEVEL_OUTOF10: 0
PAINLEVEL_OUTOF10: 2
PAINLEVEL_OUTOF10: ASSUMED PAIN PRESENT
PAINLEVEL_OUTOF10: 0
PAINLEVEL_OUTOF10: 0
PAINLEVEL_OUTOF10: 1
PAINLEVEL_OUTOF10: 8
PAINLEVEL_OUTOF10: ASSUMED PAIN PRESENT
PAINLEVEL_OUTOF10: 0
PAINLEVEL_OUTOF10: 3
PAINLEVEL_OUTOF10: 0
PAINLEVEL_OUTOF10: 3
PAINLEVEL_OUTOF10: 0
PAINLEVEL_OUTOF10: 6
PAINLEVEL_OUTOF10: 2
PAINLEVEL_OUTOF10: ASSUMED PAIN PRESENT
PAINLEVEL_OUTOF10: ASSUMED PAIN PRESENT
PAINLEVEL_OUTOF10: 0
PAINLEVEL_OUTOF10: ASSUMED PAIN PRESENT
PAINLEVEL_OUTOF10: 0
PAINLEVEL_OUTOF10: 0
PAINLEVEL_OUTOF10: 2
PAINLEVEL_OUTOF10: 2
PAINLEVEL_OUTOF10: 7
PAINLEVEL_OUTOF10: 0
PAINLEVEL_OUTOF10: 7
PAINLEVEL_OUTOF10: 0
PAINLEVEL_OUTOF10: 0
PAINLEVEL_OUTOF10: 2
PAINLEVEL_OUTOF10: 0
PAINLEVEL_OUTOF10: 2
PAINLEVEL_OUTOF10: 0
PAINLEVEL_OUTOF10: 8
PAINLEVEL_OUTOF10: 8
PAINLEVEL_OUTOF10: ASSUMED PAIN PRESENT
PAINLEVEL_OUTOF10: 4
PAINLEVEL_OUTOF10: 2
PAINLEVEL_OUTOF10: 8
PAINLEVEL_OUTOF10: 0
PAINLEVEL_OUTOF10: 7
PAINLEVEL_OUTOF10: 0
PAINLEVEL_OUTOF10: 0
PAINLEVEL_OUTOF10: 3
PAINLEVEL_OUTOF10: 0
PAINLEVEL_OUTOF10: 7
PAINLEVEL_OUTOF10: 0
PAINLEVEL_OUTOF10: 0
PAINLEVEL_OUTOF10: 9
PAINLEVEL_OUTOF10: 0
PAINLEVEL_OUTOF10: 4
PAINLEVEL_OUTOF10: 0
PAINLEVEL_OUTOF10: ASSUMED PAIN PRESENT
PAINLEVEL_OUTOF10: 0

## 2018-01-01 ASSESSMENT — GAIT ASSESSMENTS
GAIT LEVEL OF ASSIST: UNABLE TO PARTICIPATE
GAIT LEVEL OF ASSIST: MAXIMAL ASSIST
DISTANCE (FEET): 10
DEVIATION: SHUFFLED GAIT;BRADYKINETIC;DECREASED TOE OFF;DECREASED HEEL STRIKE
ASSISTIVE DEVICE: HAND HELD ASSIST

## 2018-01-01 ASSESSMENT — LIFESTYLE VARIABLES
EVER_SMOKED: NEVER
DO YOU DRINK ALCOHOL: NO

## 2018-01-01 ASSESSMENT — COPD QUESTIONNAIRES
HAVE YOU SMOKED AT LEAST 100 CIGARETTES IN YOUR ENTIRE LIFE: NO/DON'T KNOW
DO YOU EVER COUGH UP ANY MUCUS OR PHLEGM?: YES, A FEW DAYS A WEEK OR MONTH
COPD SCREENING SCORE: 3
DURING THE PAST 4 WEEKS HOW MUCH DID YOU FEEL SHORT OF BREATH: NONE/LITTLE OF THE TIME

## 2018-01-01 ASSESSMENT — PULMONARY FUNCTION TESTS: FVC: 1.88

## 2018-01-01 ASSESSMENT — ACTIVITIES OF DAILY LIVING (ADL): TOILETING: REQUIRES ASSIST

## 2018-04-20 PROBLEM — N17.9 ACUTE ON CHRONIC KIDNEY FAILURE (HCC): Status: ACTIVE | Noted: 2018-01-01

## 2018-04-20 PROBLEM — D64.9 SYMPTOMATIC ANEMIA: Status: ACTIVE | Noted: 2018-01-01

## 2018-04-20 PROBLEM — N18.9 ACUTE ON CHRONIC KIDNEY FAILURE (HCC): Status: ACTIVE | Noted: 2018-01-01

## 2018-04-20 PROBLEM — T68.XXXA HYPOTHERMIA: Status: ACTIVE | Noted: 2018-01-01

## 2018-04-20 PROBLEM — D62 ACUTE BLOOD LOSS ANEMIA: Status: ACTIVE | Noted: 2018-01-01

## 2018-04-20 PROBLEM — J96.00 ACUTE RESPIRATORY FAILURE (HCC): Status: ACTIVE | Noted: 2018-01-01

## 2018-04-20 NOTE — CARE PLAN
Problem: Infection  Goal: Will remain free from infection  Outcome: PROGRESSING AS EXPECTED  Standard precautions in place with every patient interaction. Frequent handwashing and foaming utilized to prevent spread of infection to patient or staff members.

## 2018-04-20 NOTE — ED NOTES
[place] from Lab called with critical result of pH at 7.14. Critical lab result read back to [place].   Dr. Siegel notified of critical lab result.  Critical lab result read back by Dr. Siegel.

## 2018-04-20 NOTE — ED PROVIDER NOTES
"ED Provider Note    ER PROVIDER NOTE        CHIEF COMPLAINT  No chief complaint on file.      HPI  Jose Connor is a 77 y.o. male who presents as transfer from . No history is obtainable from the patient due to his clinical status. Apparently he was initially having some slight slurred speech and right sided facial droop yesterday, although no other complaints and was acting like himself. He went to the hospital there had unremarkable MRI. And went home. Today he was acting confused and \"dazed\" per his wife and they went back to the emergency department. When he arrived at the outside hospital, his pulse was 24 and he was very difficult to arouse, he was given atropine, with heart rate into the 60s and he was much more alert. He was noted to be disoriented and confused although history limited at outside hospital as well. Over the last several days he initially had some constipation but then has had some progressive diarrhea, no dark stool or blood in his stool for life. Was not complaining of any abdominal pain but appeared to have some abdominal pain at the outside hospital as well. He had not been complaining of any chest pain, headaches, no vomiting. No known ingestions and no fevers or chills. No other abnormal movement or weakness although wife states he still seemed to have some slurred speech today    In route from outside facility he required a total of 400 of ketamine for sedation as he was very agitated and on arrival here he is somnolent but intermittently agitated    REVIEW OF SYSTEMS  Full history is limited due to patients clinical status PAST MEDICAL HISTORY   has a past medical history of Arthritis; Cholesterol blood decreased; Diabetes; Heart burn; Hiatus hernia syndrome; Hypertension; Pain (7/3/12); and Pain (4/15/14).    SURGICAL HISTORY   has a past surgical history that includes cervical disk and fusion anterior (7/11/2012); other (1991); and lumbar decompression (4/21/2014).    FAMILY " "HISTORY  History reviewed. No pertinent family history.    SOCIAL HISTORY  Social History     Social History   • Marital status:      Spouse name: N/A   • Number of children: N/A   • Years of education: N/A     Social History Main Topics   • Smoking status: Former Smoker     Packs/day: 1.00     Years: 10.00     Types: Cigarettes     Quit date: 1/1/1965   • Smokeless tobacco: Never Used   • Alcohol use No   • Drug use: No   • Sexual activity: Not on file     Other Topics Concern   • Not on file     Social History Narrative   • No narrative on file      History   Drug Use No       CURRENT MEDICATIONS  Home Medications    **Home medications have not yet been reviewed for this encounter**         ALLERGIES  Allergies   Allergen Reactions   • Hydrocodone      Passes out   • Oxycodone      Passes out       PHYSICAL EXAM  VITAL SIGNS: BP (!) 83/44   Pulse (!) 49   Temp (!) 35.8 °C (96.4 °F)   Resp (!) 26   Ht 1.753 m (5' 9\")   Wt 98 kg (216 lb 0.8 oz)   SpO2 100%   BMI 31.91 kg/m²   Pulse ox interpretation: I interpret this pulse ox as normal.    Constitutional: Ill-appearing Patient minimally responsive, he is somnolent but will occasionally become agitated for a few seconds, he is responsive to painful stimuli  HENT: No signs of trauma, Bilateral external ears normal, Nose normal.   Eyes: Pupils are equal and minimally reactive, Conjunctiva normal, Non-icteric.   Neck: Normal range of motion, no rigidity Supple, No stridor.   Lymphatic: No lymphadenopathy noted.   Cardiovascular: Bradycardic, no murmurs.   Thorax & Lungs: Diminished breath sounds throughout.   Abdomen: Bowel sounds normal, Soft, No tenderness, No masses, No pulsatile masses. No peritoneal signs.  Skin: Cool Dry, multiple areas of ecchymosis to upper arms.   Back: No bony tenderness, No CVA tenderness.   Extremities: Intact distal pulses, No cyanosis,   Musculoskeletal: No tenderness to palpation or major deformities noted.   Neurologic: " Patient is primarily somnolent well-built occasionally become agitated and moving all 4 extremities, does not open his eyes and no verbal response, no clonus, no seizure activity noted      DIAGNOSTIC STUDIES / PROCEDURES    Reviewed records from outside facility, troponin 0.5, CK-MB 19  Urine drug screen positive for TCA otherwise negative    CT of abdomen and pelvis with contrast is normal with no aneurysm or other abnormality, CT of chest shows multifocal infiltrate although no other abnormality CT of head is unremarkable  Creatinine 2.8, BUN 58 potassium 6.3 bicarb 10  At outside facility he was started on heparin drip  He was also given Protonix    EKG  Interpreted by me    Rhythm: Sinus bradycardia Rate: 54  Axis: normal  Intervals: normal  Ectopy: none  Conduction: normal  ST Segments: no acute change  T Waves: T-wave inversions inferiorly and laterally  Q Waves: none  Changes are new from prior ECG in 2014    LABS  Labs Reviewed   CBC WITH DIFFERENTIAL - Abnormal; Notable for the following:        Result Value    WBC 3.9 (*)     RBC 2.33 (*)     Hemoglobin 7.3 (*)     Hematocrit 22.3 (*)     MCHC 32.7 (*)     RDW 60.7 (*)     Neutrophils-Polys 75.40 (*)     Lymphocytes 14.00 (*)     Lymphs (Absolute) 0.55 (*)     All other components within normal limits    Narrative:     Indicate which anticoagulants the patient is on:->UNKNOWN   COMP METABOLIC PANEL - Abnormal; Notable for the following:     Sodium 130 (*)     Potassium 6.0 (*)     Co2 13 (*)     Glucose 60 (*)     Bun 57 (*)     Creatinine 2.82 (*)     Calcium 8.2 (*)     Alkaline Phosphatase 184 (*)     Albumin 3.1 (*)     Total Protein 5.8 (*)     All other components within normal limits    Narrative:     Indicate which anticoagulants the patient is on:->UNKNOWN   TROPONIN - Abnormal; Notable for the following:     Troponin I 0.41 (*)     All other components within normal limits    Narrative:     Indicate which anticoagulants the patient is  on:->UNKNOWN   LIPASE - Abnormal; Notable for the following:     Lipase 237 (*)     All other components within normal limits    Narrative:     Indicate which anticoagulants the patient is on:->UNKNOWN   MAGNESIUM - Abnormal; Notable for the following:     Magnesium 2.7 (*)     All other components within normal limits    Narrative:     Indicate which anticoagulants the patient is on:->UNKNOWN   PHOSPHORUS - Abnormal; Notable for the following:     Phosphorus 7.2 (*)     All other components within normal limits    Narrative:     Indicate which anticoagulants the patient is on:->UNKNOWN   PROTHROMBIN TIME - Abnormal; Notable for the following:     PT 25.3 (*)     INR 2.34 (*)     All other components within normal limits    Narrative:     Indicate which anticoagulants the patient is on:->UNKNOWN   APTT - Abnormal; Notable for the following:     APTT >240.0 (*)     All other components within normal limits    Narrative:     Indicate which anticoagulants the patient is on:->UNKNOWN   ARTERIAL BLOOD GAS - Abnormal; Notable for the following:     Ph 7.14 (*)     Pco2 38.6 (*)     O2 Saturation 85.1 (*)     Hco3 13 (*)     Base Excess -15 (*)     All other components within normal limits   ESTIMATED GFR - Abnormal; Notable for the following:     GFR If  27 (*)     GFR If Non  22 (*)     All other components within normal limits    Narrative:     Indicate which anticoagulants the patient is on:->UNKNOWN   CBC WITH DIFFERENTIAL - Abnormal; Notable for the following:     WBC 2.6 (*)     RBC 1.83 (*)     Hemoglobin 5.7 (*)     Hematocrit 17.4 (*)     MCHC 32.4 (*)     RDW 59.7 (*)     Neutrophils-Polys 81.30 (*)     Lymphocytes 13.30 (*)     Lymphs (Absolute) 0.35 (*)     All other components within normal limits    Narrative:     Indicate which anticoagulants the patient is on:->HEPARIN   ACCU-CHEK GLUCOSE - Abnormal; Notable for the following:     Glucose - Accu-Ck 53 (*)     All other  "components within normal limits   ISTAT ARTERIAL BLOOD GAS - Abnormal; Notable for the following:     Ph 7.203 (*)     Pco2 40.2 (*)     Po2 41 (*)     Tco2 17 (*)     S02 66 (*)     Hco3 15.8 (*)     BE -11 (*)     Ph Temp Greg 7.264 (*)     Po2 Temp Cor 30 (*)     All other components within normal limits   ISTAT ARTERIAL BLOOD GAS - Abnormal; Notable for the following:     Ph 7.263 (*)     Po2 245 (*)     Tco2 17 (*)     S02 100 (*)     Hco3 15.8 (*)     BE -10 (*)     Ph Temp Greg 7.325 (*)     Po2 Temp Cor 225 (*)     All other components within normal limits   LACTIC ACID    Narrative:     Indicate which anticoagulants the patient is on:->UNKNOWN   LACTIC ACID   BLOOD CULTURE    Narrative:     Per Hospital Policy: Only change Specimen Src: to \"Line\" if  specified by physician order.   BLOOD CULTURE    Narrative:     Per Hospital Policy: Only change Specimen Src: to \"Line\" if  specified by physician order.   TSH    Narrative:     Indicate which anticoagulants the patient is on:->UNKNOWN   COD (ADULT)   COMP METABOLIC PANEL    Narrative:     Indicate which anticoagulants the patient is on:->HEPARIN   APTT    Narrative:     Indicate which anticoagulants the patient is on:->HEPARIN   PLATELET MAPPING WITH BASIC TEG    Narrative:     Is the patient on heparin (including low molecular weight  heparin, subcutaneous heparin, or lovenox)?->Yes  Do you want to extend TEG graph to LY30? (If no, graph will  terminate at MA)->Yes   FRESH FROZEN PLASMA    Narrative:     2 units   PROCALCITONIN   HEMOGLOBIN A1C   HEMOGLOBIN AND HEMATOCRIT   CULTURE RESP W/O GRAM STAIN   AFB CULTURE   CYTOLOGY   FUNGAL CULTURE   CULTURE RESPIRATORY W/ GRM STN   TRIGLYCERIDE   RELEASE RED BLOOD CELLS   RELEASE FRESH FROZEN PLASMA   TRANSFUSE RED BLOOD CELLS-NURSING COMMUNICATION   TRANSFUSE RED BLOOD CELLS-NURSING COMMUNICATION   TRANSFUSE FRESH FROZEN PLASMA-NURSING COMMUNICATION       All labs reviewed by me.    RADIOLOGY  DX-CHEST-PORTABLE " (1 VIEW)   Final Result      Findings consistent with mild pulmonary edema. Endotracheal tube and central venous line in place as noted above.      ECHOCARDIOGRAM COMP W/O CONT    (Results Pending)   DX-CHEST-PORTABLE (1 VIEW)    (Results Pending)     The radiologist's interpretation of all radiological studies have been reviewed by me.    COURSE & MEDICAL DECISION MAKING  Nursing notes, VS, PMSFHx reviewed in chart.    12:03 AM Patient seen and examined at bedside. Patient will be treated with fluids and calcium. Ordered for labs, ECG to evaluate his symptoms.     12:55 AM    Patient's CMP has returned, he is significant only acidemic, reevaluated, no real change in his mental status although he does seem to be in some pain and agitated intermittently, will trial a small dose of fentanyl    CBC has returned with low hemoglobin, COD ordered, discussed with wife and he has had no blood in his stool or dark tarry stool    1:21 AM    reevaluated, blood pressure systolics 100, heart rate 50s no real change in clinical status      1:42 AM  Patient is becoming hypoxemic, his respiratory drive appears to be diminishing, will plan for intubation    2:05 AM  In preparation for intubation, patient still bradycardic to low 40s so will give atropine, 1 mg, as well as phenylephrine as his systolics are in the 70s. With his acidemia, 2 units of bicarb as well    Bedside glucose 53, ordered for amp D 50    EMERGENT INTUBATION PROCEDURE NOTE  INDICATION: Respiratory failure  TECHNIQUE: Rapid sequence with emergent consent  After pre-oxygenating the patient for 2 minutes, a modified rapid-sequence induction was performed using etomidate and rocuronium with cricoid pressure. Using a #4 glide scope  blade, a grade 1 view was obtained, and a 8.0 endotracheal tube was placed and secured.  Placement was confirmed with auscultation and end-tidal CO2.  COMPLICATIONS: None. The patient tolerated the procedure well with no  complications.    Immediately had vent settings with high respiratory rate of 26    Fentanyl and Versed as needed for sedation while intubated    2:22 AM  CENTRAL LINE PROCEDURE NOTE: (with ultrasound guidance)  The patient was consented all risks benefits and alternatives were discussed.  LOCATION: RIJ  POSITION:  trendelenburg.  PROCEDURE NOTE:  Sterile prep, gown, and drape protocols were used. The vascular triangle was identified.  Using ultrasound guidance, the IJwas canulated with good flow. At no point in the procedure was air aspirated with needle introduction. Wire was passed without difficulty. Dilation was performed. The central line catheter was passed over the wire without difficulty. The line was secured in place using 4-0 nylon suture. Air in the various ports was evacuated and the lines were flushed by me. Patient tolerated the procedure well there were no complications. A chest x-ray was ordered prior to use of the IV.    POST-PROCEDURE CXR:  There is no pneumothorax present. The tip of the line is well positioned at the SVC /RA junction.      Ordered for epinephrine drip through patient's line that is now placed    We will plan for a repeat CBC given his low hemoglobin, as well as repeat CMP after fluids    Additionally nursing staff noted he was hypothermic, and bear didigger was ordered    Discussed case with Dr. Borges from pulmonology who will consult on the patient    Discussed the case with Dr. Hammonds from cardiology will consult on the patient    Discussed case with Dr. Dallas for admission      3:03 AM patient's hemoglobin has returned at 5.7, he appears to be dropping here, 2 units of blood to be transfused    310 AM   Patient reevaluated, /64, heart rate 50      The total critical care time spent on this patient was 65 minutes, resuscitating patient, speaking with admitting physician, and interpreting test results. This 65 minutes is exclusive of separately billable  procedures.      Decision Making:  This is a 77 y.o. male transferred from outside facility with altered mental status as well as some apparent bradycardia. It is unclear the exact initial cause of patient's symptoms although appears to have multiorgan dysfunction at this time. He is  volume depleted. This may be from the diarrhea he has had over the past few days he has been given fluid resuscitation for this. Additionally this is likely resulted in some acute kidney injury with acidosis and hyperkalemia. Initially treated with calcium, as well as fluids as above.  Repeat labs are pending at the time of admission.  Additionally with his bradycardia I'm not sure that this is primary cardiac, may be more from his acidosis and hypothermia, did require atropine at outside facility as well as a dose here for intubation although his troponin is actually down trending here, and had a bedside echo from cardiology that was unrevealing.  For his hypothermia as above, bear hugger has been initiated. Patient was started on heparin drip at outside facility, this was discontinued on arrival here as he does appear to be bleeding. Review of workup from outside facility with no intra-abdominal thoracic or intracranial bleed. Potential for GI bleed although had no gross blood at this time. Patient has been transfused 2 units, as well as ordered for FFP in addition to the warming Carolynn hugger to help with his coagulopathy. It is unclear the exact driving force fpr his altered mental status although is likely multifactorial with his metabolic disturbance as well as bradycardia. He has had no recent fevers or headaches to suggest meningitis or encephalitis although this is considered but given his status on heparin certainly not eligible for lumbar puncture at this time.  He had an MRI yesterday that was unrevealing, and CT today that was likewise unrevealing.  While he did have positive TCA on his urine drug screen there is no overt  toxicologic pathology at this time    Patient is quite ill, and admitted to the ICU in critical condition      FINAL IMPRESSION  1. Acute hypoxemic respiratory failure (CMS-HCC)    2. Acute kidney injury (CMS-HCC)    3. Hyperkalemia    4. Anemia, unspecified type    5. Hypothermia, initial encounter         The note accurately reflects work and decisions made by me.  Ji Siegel  4/20/2018  4:20 AM

## 2018-04-20 NOTE — DISCHARGE PLANNING
Medical SW    Sw attended AM  IDT Rounds.    Per face sheet, pt has mailing address in [place], , 78yo male, admitted 4/19 to acute respiratory failure, and carries Medicare and [employer].    RN reports, pt wakes up, follows commands, gets agitated at times, will place cortrak, vent day 1, acute renal failure    Plan: Sw to assist w/ d/c planning as needed.

## 2018-04-20 NOTE — CARE PLAN
Problem: Safety  Goal: Will remain free from falls  Outcome: PROGRESSING AS EXPECTED  High fall risk precautions in place. Pt encouraged to use call light before getting out of bed. Pt verbalized understanding and able to return demonstrate how to use call light.

## 2018-04-20 NOTE — CONSULTS
Cardiology Consult Note    Date & Time note created:    4/20/2018   2:28 AM       Patient ID:   Name:             Jose Connor     YOB: 1940  Age:                 77 y.o.  male   MRN:               [id number]               Referring Physician: Dr. Siegel                                                  Reason for Consult:      NSTEMI, bradycardia    History of Present Illness:    77-year-old male with hypertension, hyperlipidemia and diabetes who was in his usual state of health until yesterday when he started having slurred speech. Patient was sent for an outpatient MRI which per report was normal. This morning, patient's wife noted that the patient was confused and not responding normally. She then called 911. Patient was taken to the local hospital where per report he was noted to be bradycardic in the 20s. Patient was given atropine after which patient's heart rate improved along with his blood pressure and mentation. Troponin was found to be elevated and the patient was transferred to our hospital. On arrival, per report patient was confused and was not responding to questions. He was also found to be hypoxic and acidemic, now status post intubation. Most of the history above was obtained from the patient's wife. Patient's wife notes that for the past few days he has had significant diarrhea and she is worried that he may be dehydrated.     No history of GI/ bleeding in the past. He has been compliant with all of his medications. No new medications recently.    Review of Systems:      A full review of systems could not be completed as the patient is intubated.     Past Medical History:   Past Medical History:   Diagnosis Date   • Arthritis     gout   • Cholesterol blood decreased    • Diabetes     oral medication   • Heart burn    • Hiatus hernia syndrome    • Hypertension    • Pain 7/3/12    shoulders and right arm   • Pain 4/15/14    0/10     There are no active hospital problems to  display for this patient.      Past Surgical History:  Past Surgical History:   Procedure Laterality Date   • LUMBAR DECOMPRESSION  4/21/2014    Performed by Francisco Pope M.D. at [facility]   • CERVICAL DISK AND FUSION ANTERIOR  7/11/2012    Performed by FRANCISCO POPE at [facility]   • OTHER  1991    neck surgery       Family History:  History reviewed. No pertinent family history.    Social History:  Social History     Social History   • Marital status:      Spouse name: N/A   • Number of children: N/A   • Years of education: N/A     Occupational History   • Not on file.     Social History Main Topics   • Smoking status: Former Smoker     Packs/day: 1.00     Years: 10.00     Types: Cigarettes     Quit date: 1/1/1965   • Smokeless tobacco: Never Used   • Alcohol use No   • Drug use: No   • Sexual activity: Not on file     Other Topics Concern   • Not on file     Social History Narrative   • No narrative on file       Hospital Medications:    Current Facility-Administered Medications:   •  ampicillin/sulbactam (UNASYN) 3 g in  mL IVPB, 3 g, Intravenous, Once, Ji Siegel M.D.  •  azithromycin (ZITHROMAX) injection 500 mg, 500 mg, Intravenous, Once, iJ Siegel M.D.  •  phenylephrine (TAMMI-SYNEPHRINE) 100 mcg/mL inj (ER IV Push Syringe) 100 mcg, 100 mcg, Intravenous, Q15 MIN PRN, Ji Siegel M.D., 100 mcg at 04/20/18 0143  •  midazolam (VERSED) 2 MG/2ML injection 2 mg, 2 mg, Intravenous, Once, Ji Siegel M.D.  •  [COMPLETED] etomidate (AMIDATE) injection 10 mg, 10 mg, Intravenous, Once, Ji Siegel M.D., 10 mg at 04/20/18 0230  •  [COMPLETED] rocuronium (ZEMURON) injection 100 mg, 100 mg, Intravenous, Once, Ji Siegel M.D., 100 mg at 04/20/18 0230  •  EPINEPHrine 1 mg/mL(1:1000) 4 mg in  mL Infusion, 0-10 mcg/min, Intravenous, Continuous, Ji Siegel M.D.  •  lactated ringers infusion, 2,000 mL, Intravenous, Once, Ji Siegel  "M.D.    Current Outpatient Prescriptions:   •  carvedilol (COREG) 12.5 MG TABS, Take 1 Tab by mouth 2 times a day, with meals., Disp: 60 Tab, Rfl: 11  •  aspirin EC (ECOTRIN) 81 MG TBEC, Take 1 Tab by mouth every day., Disp: 90 Tab, Rfl: 3  •  metformin (GLUCOPHAGE) 500 MG TABS, Take 500 mg by mouth 2 times a day, with meals., Disp: , Rfl:   •  omeprazole (PRILOSEC) 40 MG capsule, Take 40 mg by mouth 2 Times a Day., Disp: , Rfl:   •  lisinopril (PRINIVIL) 20 MG TABS, Take 20 mg by mouth every evening., Disp: , Rfl:   •  allopurinol (ZYLOPRIM) 300 MG TABS, Take 150 mg by mouth every morning. Takes 1/2 tab daily, Disp: , Rfl:   •  simvastatin (ZOCOR) 20 MG TABS, Take 40 mg by mouth every evening., Disp: , Rfl:     Current Outpatient Medications:    (Not in a hospital admission)    Medication Allergy:  Allergies   Allergen Reactions   • Hydrocodone      Passes out   • Oxycodone      Passes out       Physical Exam:  Vitals/ General Appearance:   Weight/BMI: Body mass index is 31.91 kg/m².  Blood pressure (!) 87/45, pulse 69, temperature 36.6 °C (97.9 °F), resp. rate 15, height 1.753 m (5' 9\"), weight 98 kg (216 lb 0.8 oz), SpO2 100 %.  Vitals:    04/20/18 0026 04/20/18 0032 04/20/18 0207   BP: (!) 76/35 (!) 87/45    Pulse: 69     Resp: 15     Temp: 36.6 °C (97.9 °F)     SpO2:   100%   Weight: 98 kg (216 lb 0.8 oz)     Height: 1.753 m (5' 9\")         Constitutional: Intubated  HEENT:  Normocephalic, Atraumatic  Neck:  no JVD.  Cardiovascular: Bradycardic, Normal rhythm, No murmurs, No rubs, No gallops. No lower extremity edema.   Lungs:  Normal breath sounds, breath sounds clear to auscultation bilaterally,  no crackles, no wheezing.   Abdomen:  Soft, No distention  Skin: No rash  Neurologic: Currently intubated and cannot evaluate.   Psychiatric: Intubated, cannot evaluate.    MDM (Data Review):     Records reviewed and summarized in current documentation    Lab Data Review:  Recent Labs      04/20/18   0000   WBC  3.9* "   RBC  2.33*   HEMOGLOBIN  7.3*   HEMATOCRIT  22.3*   MCV  95.7   MCH  31.3   MCHC  32.7*   RDW  60.7*   PLATELETCT  239   MPV  10.3     Recent Labs      04/20/18   0000   SODIUM  130*   POTASSIUM  6.0*   CHLORIDE  108   CO2  13*   GLUCOSE  60*   BUN  57*   CREATININE  2.82*   CALCIUM  8.2*     Recent Labs      04/20/18   0000   TROPONINI  0.41*       Labs reviewed as noted above.    Imaging/Procedures Review:      EKG: On arrival here was personally reviewed and shows sinus tachycardia 54 beats per minute with first-degree AV block. Nonspecific intraventricular conduction delay. Poor progression. T-wave inversions in the inferolateral leads.    EKG at the outside hospital was personally reviewed and showed sinus rhythm at 60 beats per minute, first-degree AV block, less than 1 mm ST depressions in inferolateral leads. Poor R-wave progression. Nonspecific intraventricular conduction delay.    Telemetry strip obtained from outside hospital shows bradycardia in the high 50s.    ECHO: Bedside echocardiogram was performed which showed bradycardia but preserved LV systolic function.    MDM (Assessment and Plan):     There are no active hospital problems to display for this patient.    Bradycardia: No EKG/telemetry evidence of bradycardia into the 20s. Lowest heart rate seen is in the 40s, while in sinus. Likely secondary to metabolic derangements. Patient is status post intubation. Primary team is treating hyperkalemia etc. For now continue to monitor cardiac rhythm. Should patient have worsening bradycardia please let us know. Otherwise avoid AV janet blocking agents for now.    NSTEMI: Likely type II, demand ischemia in the setting of worsening renal function, anemia and severe acidemia. For now would continue to trend troponins. Agree with blood transfusion as planned by Dr. Siegel. Further workup can be considered depending on troponin trend and anemia workup. Holding aspirin due to anemia. Holding statin for now  due to abnormal LFTs. Formal echocardiogram has been ordered for evaluation of LV function.    Thank you for allowing me to participate in the care of this patient. Please do not hesitate to contact me with any questions.    Rima Hammonds MD  Cardiologist  [facility]

## 2018-04-20 NOTE — ED NOTES
Pt became hypoxic on monitor and was found to have an agonal/biot respiratory pattern, not initiating own respirations unless vigorous painful stimuli applied or markedly hypoxic, Pt placed on 15L NRB, O2 saturation back to normal limits, however Pt maintained highly deranged respiratory pattern. ERP notified immediately, to bedside for intubation, pharmacy and RT also at bedside. Pt given atropine, D50, phenylephrine, and bicarb prior to intubation for bradycardia, hypoglycemia (53 on accu-check), hypotension, and acidosis, respectively. Pt given etomidate and succinylcholine following the aforementioned medications for intubation, Pt intubated by ERP without incident.

## 2018-04-20 NOTE — PROGRESS NOTES
"Pulmonary Critical Care Progress Note        Date of Admission:  4/21/2018    Chief Complaint: ALOC    History of Present Illness: 77-year-old  lives in [place], [place].    Apparently yesterday, he began to have slurred   speech and some right-sided facial droop.  The wife took him to the hospital.    He had an MRI which apparently was normal.  This morning the wife noted him   to be somewhat confused and acting \"spacey.\"  He presented back to the   hospital in [place].  He was reportedly bradycardic into the 20s.  He was given   atropine with improvement in his heart rate and mentation.  He was transferred   to [facility] [place] for subspecialty care.  After arriving at [facility] [place], he has developed   hypoxemia.  He was somewhat combative.  He was requiring increasing amounts of   oxygen and he was intubated.  He has also been found to be hypoglycemic.     Dextrose was administered.  He also had elevated potassium and he has received   calcium.  A rectal temperature reveals that his temperature is 90 degrees and   he is now being externally warmed with a Carolynn Hugger.    ROS:  Respiratory: unable to perform due to the patient's inability to effectively communicate, Cardiac: unable to perform due to the patient's inability to effectively communicate, GI: unable to perform due to the patient's inability to effectively communicate.  All other systems negative.    Interval Events:  24 hour interval history reviewed    - arouses, f/c's, on propofol   - epi gtt 3 mcg/min   -    - CVP 14-15   - vent day 1, full    - heparin gtt stopped pta   - Hgb up to 8.7 after PRBCs, s/p FFP   - CAR noted; K up   - check echo    - CT abd/pelvis today, ? reto bleed    - hypothermic 32.9 - re-warming   -     PFSH:  No change.    Respiratory:  Gibson Vent Mode: APVCMV, Rate (breaths/min): 26, Vt Target (mL): 450, PEEP/CPAP: 8, FiO2: 50, Static Compliance (ml / cm H2O): 53, Control VTE (exp VT): 464  Pulse Oximetry: 99 %  Chest Tube Drains:          Exam: " rhonchi bibasilar and diminished breath sounds moderate  ImagingAvailable data reviewed   Recent Labs      04/20/18   0125  04/20/18   0236  04/20/18   0300   [id number]  7.14*   --    --    [id number]  38.6*   --    --    [id number]  65.6   --    --    MYBX1EHA  85.1*   --    --    ARTHCO3  13*   --    --    ARTBE  -15*   --    --    ISTATAPH   --   7.203*  7.263*   ISTATAPCO2   --   40.2*  34.9   ISTATAPO2   --   41*  245*   ISTATATCO2   --   17*  17*   [id number]   --   66*  100*   ISTATARTHCO3   --   15.8*  15.8*   ISTATARTBE   --   -11*  -10*   ISTATTEMP   --   90.4 F  90.6 F   ISTATFIO2   --   80  80   ISTATSPEC   --   Arterial  Arterial   ISTATAPHTC   --   7.264*  7.325*   [id number]   --   30*  225*       HemoDynamics:  Pulse: 66, Heart Rate (Monitored): 66  Blood Pressure : 147/56, NIBP: 110/46       Exam: regular rate and rhythm  Imaging: Available data reviewed  Recent Labs      04/20/18   0000  04/20/18   0545   TROPONINI  0.41*  0.40*       Neuro:  GCS Total Valdemar Coma Score: 10       Exam: Heavily sedated  Imaging: Available data reviewed    Fluids:  Intake/Output       04/18/18 0700 - 04/19/18 0659 (Not Admitted) 04/19/18 0700 - 04/20/18 0659 04/20/18 0700 - 04/21/18 0659      0711-1825 5206-3315 Total 5357-8870 2928-8579 Total 3926-9956 9268-9063 Total       Intake    I.V.  --  -- --  --  57.4 57.4  24.5  -- 24.5    Phenylephrine Volume -- -- -- -- 1 1 -- -- --    Epinephrine Volume -- -- -- -- 56.4 56.4 24.5 -- 24.5    Blood  --  -- --  --  885 885  700  -- 700    Volume (RELEASE RED BLOOD CELLS) -- -- -- -- 275 275 -- -- --    Volume (RELEASE FRESH FROZEN PLASMA) -- -- -- -- 260 260 -- -- --    Volume (RELEASE RED BLOOD CELLS) -- -- -- -- 350 350 -- -- --    Volume (RELEASE FRESH FROZEN PLASMA) -- -- -- -- -- -- 700 -- 700    Total Intake -- -- -- -- 942.4 942.4 724.5 -- 724.5       Output    Urine  --  -- --  --  300 300  90  -- 90    Indwelling Cathether -- -- -- -- 300 300 90 -- 90    Total  Output -- -- -- -- 300 300 90 -- 90       Net I/O     -- -- -- -- 642.4 642.4 634.5 -- 634.5        Weight: 84.6 kg (186 lb 8.2 oz)  Recent Labs      18   0000  18   0545   SODIUM  130*  134*   --    POTASSIUM  6.0*  5.3   --    CHLORIDE  108  110   --    CO2  13*  14*   --    BUN  57*  53*   --    CREATININE  2.82*  2.60*   --    MAGNESIUM  2.7*   --   2.4   PHOSPHORUS  7.2*   --   5.6*   CALCIUM  8.2*  8.0*   --        GI/Nutrition:  Exam: abdomen is soft and non-tender  Imaging: Available data reviewed  NPO  Liver Function  Recent Labs      18   0000  18   ALTSGPT  21  17   ASTSGOT  34  30   ALKPHOSPHAT  184*  145*   TBILIRUBIN  0.3  0.3   LIPASE  237*   --    GLUCOSE  60*  136*       Heme:  Recent Labs      18   0000  18   0730   RBC  2.33*  1.83*   --    --    HEMOGLOBIN  7.3*  5.7*   --   8.7*   HEMATOCRIT  22.3*  17.4*   --   26.1*   PLATELETCT  239  184   --    --    PROTHROMBTM  25.3*   --    --    --    APTT  >240.0*   --   214.5*   --    INR  2.34*   --    --    --        Infectious Disease:  Temp  Av.3 °C (93.7 °F)  Min: 31 °C (87.8 °F)  Max: 36.7 °C (98 °F)  Micro: reviewed  Recent Labs      18   0000  18   WBC  3.9*  2.6*   --    NEUTSPOLYS  75.40*  81.30*   --    LYMPHOCYTES  14.00*  13.30*   --    MONOCYTES  9.20  4.20   --    EOSINOPHILS  0.80  0.80   --    BASOPHILS  0.30  0.00   --    ASTSGOT  34   --   30   ALTSGPT  21   --   17   ALKPHOSPHAT  184*   --   145*   TBILIRUBIN  0.3   --   0.3     Current Facility-Administered Medications   Medication Dose Frequency Provider Last Rate Last Dose   • EPINEPHrine 1 mg/mL(1:1000) 4 mg in  mL Infusion  0-10 mcg/min Continuous Ji Siegel M.D. 7.5 mL/hr at 18 0915 2 mcg/min at 18 0915   • pantoprazole (PROTONIX) injection 40 mg  40 mg Q12HRS Manuel Lombardo M.D.   40 mg at 18 0900   •  Respiratory Care per Protocol   Continuous RT Manuel Lombardo M.D.       • senna-docusate (PERICOLACE or SENOKOT S) 8.6-50 MG per tablet 2 Tab  2 Tab BID Manuel Lombardo M.D.   Stopped at 04/20/18 0900    And   • polyethylene glycol/lytes (MIRALAX) PACKET 1 Packet  1 Packet QDAY PRN Manuel Lombardo M.D.        And   • magnesium hydroxide (MILK OF MAGNESIA) suspension 30 mL  30 mL QDAY PRN Manuel Lombardo M.D.        And   • bisacodyl (DULCOLAX) suppository 10 mg  10 mg QDAY PRN Manuel Lombardo M.D.       • chlorhexidine (PERIDEX) 0.12 % solution 15 mL  15 mL BID Manuel Lombardo M.D.   15 mL at 04/20/18 0900   • fentaNYL (SUBLIMAZE) injection 25 mcg  25 mcg Q HOUR PRN Manuel Lombardo M.D.        Or   • fentaNYL (SUBLIMAZE) injection 50 mcg  50 mcg Q HOUR PRN Manuel Lombardo M.D.        Or   • fentaNYL (SUBLIMAZE) injection 100 mcg  100 mcg Q HOUR PRN Manuel Lombardo M.D.       • ipratropium-albuterol (DUONEB) nebulizer solution 3 mL  3 mL Q2HRS PRN (RT) Manuel Lombardo M.D.       • ipratropium-albuterol (DUONEB) nebulizer solution 3 mL  3 mL Q4HRS (RT) Manuel Lombardo M.D.   3 mL at 04/20/18 0652   • insulin regular (HUMULIN R) injection 2-9 Units  2-9 Units Q6HRS Manuel Lombardo M.D.   2 Units at 04/20/18 0637    And   • glucose 4 g chewable tablet 16 g  16 g Q15 MIN PRN Manuel Lombardo M.D.        And   • dextrose 50% (D50W) injection 25 mL  25 mL Q15 MIN PRN Manuel Lombardo M.D.       • ampicillin/sulbactam (UNASYN) 3 g in  mL IVPB  3 g Q12HRS Manuel Lombardo M.D.       • [START ON 4/21/2018] azithromycin (ZITHROMAX) 500 mg in D5W 250 mL IVPB  500 mg Q24HRS Manuel Lombardo M.D.       • ondansetron (ZOFRAN) syringe/vial injection 4 mg  4 mg Q4HRS PRN Hernan Dallas M.D.       • ondansetron (ZOFRAN ODT) dispertab 4 mg  4 mg Q4HRS PRN Hernan Dallas M.D.       • acetaminophen (TYLENOL) tablet 650 mg   650 mg Q6HRS PRN Hernan Dallas M.D.       • propofol (DIPRIVAN) injection  0-80 mcg/kg/min Continuous Manuel Lombardo M.D. 2.9 mL/hr at 04/20/18 0849 5 mcg/kg/min at 04/20/18 0849   • lactated ringers infusion   Continuous Manuel Lombardo M.D. 200 mL/hr at 04/20/18 0629     • SODIUM CHLORIDE 0.9 % IV SOLN             Last reviewed on 4/20/2018  5:34 AM by Abimbola Clarke R.N.    Quality  Measures:  Labs reviewed                      Problems:    1.  Acute hypoxemic respiratory failure.  2.  Ventilator-dependent respiratory failure.  3.  Acute unspecified encephalopathy.  CT scan of the head in [place] is negative   for acute pathology.  MRI done yesterday was negative for acute pathology.  4.  Query pneumonia.  5.  Hypothermia.  6.  Acute blood loss anemia.  The source of his blood loss is not clear.  It   may be gastrointestinal.  7.  Hyperkalemia.  8.  Leukopenia.  9.  Hyponatremia.  10.  Acute kidney injury.  11.  Elevated lipase.  12.  Positive troponin.  13.  Hypoglycemia.  14.  Diabetes mellitus type 2.  15.  History of systemic arterial hypertension.  16.  Gastroesophageal reflux disease.  17.  Dyslipidemia.  18.  Chronic back pain.      I will continue full ventilator support. I have adjusted what her settings.  I will obtain a CT scan of the abdomen and pelvis, rule out retroperitoneal hemorrhage or ischemic bowel  Continue to follow H&H  I will titrate vasopressors  He remains very critically ill at high risk for further deterioration. I have updated his wife at bedside  Follow renal function, correct electrolytes, nephrology consultation if urine output and renal function did not improve    Discussed patient condition and risk of morbidity and/or mortality with Family, RN, RT and QA team.    The patient remains critically ill. Additional critical care time = 46 minutes in directly providing and coordinating critical care and extensive data review.  No time overlap and excludes procedures.

## 2018-04-20 NOTE — PROGRESS NOTES
Patient discussed on rounds with intensivist, RN, respiratory therapy, and pharmacy.  Intubated and sedated, unable to give interval history  Will follow commands when sedation is turned down, he gets very agitated  On propofol, on epinephrine, becomes bradycardic when it is turend down  Check CT scan of abdomen to rule out retroperitoneal bleed  Check cortisol level, after lab is drawn start stress dose hydrocortisone

## 2018-04-20 NOTE — OP REPORT
DATE OF SERVICE:  04/20/2018    TITLE OF THE PROCEDURE:  Diagnostic and therapeutic flexible fiberoptic   bronchoscopy with bronchoalveolar lavage.    INDICATION FOR THE PROCEDURE:  Atelectasis.    POSTPROCEDURE DIAGNOSES:  1.  Normal endobronchial anatomy.  2.  No endobronchial tumor identified.  3.  Blood-tinged secretions seen bilaterally, suctioned until clear.    NARRATIVE:  The patient was sedated, intubated and ventilated at the time of   this procedure.  The flexible fiberoptic bronchoscope was inserted through the   lumen of the endotracheal tube and advanced into the distal trachea without   difficulty.  The airways were examined to the subsegmental bronchus level   bilaterally.  The endobronchial anatomy was normal.  No tumor was identified.    There was a moderate amount of blood-tinged secretions seen bilaterally and   these were suctioned until clear.  Following this, I then performed   bronchoalveolar lavage in the basilar segments of the right lower lobe using   the standard technique with good fluid return.  Bronchoalveolar lavage fluid   from the right lower lobe is submitted to laboratory for cytology, Gram stain,   culture and sensitivity, acid fast bacilli smear and culture and fungal   culture.  The patient tolerated the procedure quite nicely.  No complications   are apparent.  His heart rate and rhythm, blood pressure and oxygen saturation   were continuously monitored.       ____________________________________     MD JOHANA BAZAN / JERICHO    DD:  04/20/2018 04:10:31  DT:  04/20/2018 04:24:05    D#:  [id number]  Job#:  [id number]

## 2018-04-20 NOTE — CONSULTS
"DATE OF SERVICE:  04/20/2018    PULMONARY MEDICINE AND CRITICAL CARE MEDICINE CONSULTATION    REQUESTING PHYSICIAN:  Ji Siegel MD    CONSULTING PHYSICIAN:  Manuel Lombardo MD    TYPE OF CONSULTATION:  Pulmonary medicine and critical care medicine.    REASON FOR CONSULTATION:  Assist with critical care management in gentleman   with acute unspecified encephalopathy, ventilator dependent respiratory   failure and anemia.    CHIEF COMPLAINT:  Respiratory failure.    HISTORY OF PRESENT ILLNESS:  I was kindly asked by Dr. Siegel to see and   evaluate this gentleman regarding the above problem.  This is a 77-year-old   gentleman who lives in [place], [place].  The history is obtained from healthcare   providers, the medical record and the wife at the bedside as this gentleman   cannot give me any history.  Apparently yesterday, he began to have slurred   speech and some right-sided facial droop.  The wife took him to the hospital.    He had an MRI which apparently was normal.  This morning the wife noted him   to be somewhat confused and acting \"spacey.\"  He presented back to the   hospital in [place].  He was reportedly bradycardic into the 20s.  He was given   atropine with improvement in his heart rate and mentation.  He was transferred   to [facility] for subspecialty care.  After arriving at [facility], he has developed   hypoxemia.  He was somewhat combative.  He was requiring increasing amounts of   oxygen and he was intubated.  He has also been found to be hypoglycemic.     Dextrose was administered.  He also had elevated potassium and he has received   calcium.  A rectal temperature reveals that his temperature is 90 degrees and   he is now being externally warmed with a Carolynn Hugger.    The wife tells me that he has had a cold for the last couple of weeks, but she   believes he has been getting better.    CURRENT MEDICATIONS:  1.  Allopurinol 150 mg a day.  2.  Aspirin 81 mg a day.  3.  Carvedilol 12.5 mg twice a " day.  4.  Lisinopril 20 mg a day.  5.  Metformin 1000 mg in the morning and 500 mg in the evening.  6.  Omeprazole 40 mg twice a day.  7.  Simvastatin 40 mg a day.    ALLERGIES:  TO HYDROCODONE AND OXYCODONE.    PAST SURGICAL HISTORY:  He has had lumbar spine surgery and neck surgery.    ILLNESSES:  Diabetes mellitus type 2, systemic arterial hypertension,   gastroesophageal reflux disease, dyslipidemia, and chronic back pain.    SOCIAL HISTORY:  He is .  He lives in [place].  He does not smoke.    FAMILY HISTORY:  Noncontributory.    REVIEW OF SYSTEMS:  Not obtainable.    PHYSICAL EXAMINATION:  VITAL SIGNS:  Rectal temperature is 90, his blood pressure is 95/45, heart   rate is 50, and respiratory rate is 26.  GENERAL:  He is a sedated gentleman.  HEENT:  Normocephalic, atraumatic.  Sinuses are nontender.  Nares patent.    Oropharynx is with moist mucous membranes.  Endotracheal tube is in place.    His pupils are 4 mm.  They are equal.  They sluggishly react to light.  NECK:  Trachea is midline, supple.  CHEST:  Symmetrical.  HEART:  Regular rhythm.  He is bradycardic.  LUNGS:  There are scattered coarse crackles bilaterally.  ABDOMEN:  Soft, nondistended, and nontender.  EXTREMITIES:  No clubbing or cyanosis.  NEUROLOGIC:  His pupils are as described above.  He does not respond to me   otherwise.  He has been sedated.    DIAGNOSTIC DATA:  His white blood cell count is 2600, hemoglobin 5.7,   hematocrit 17.4, and platelet count is 184,000.  Sodium 130, potassium 6,   chloride 108, CO2 13, BUN 57, creatinine 2.82, glucose is 60, calcium was 8.2,   alkaline phosphatase 184, phosphorus 7.2, magnesium 2.7, and lipase is 237.    Lactic acid is 1.2.  Troponin is 0.41.  His INR is 2.34.  His PTT is greater   than 240 seconds.  Arterial blood gas shows a pH of 7.33, pCO2 of 29, and pO2   of 225.  Chest x-ray shows evidence of some mild edema.    IMPRESSION:  1.  Acute hypoxemic respiratory failure.  2.   Ventilator-dependent respiratory failure.  3.  Acute unspecified encephalopathy.  CT scan of the head in [place] is negative   for acute pathology.  MRI done yesterday was negative for acute pathology.  4.  Query pneumonia.  5.  Hypothermia.  6.  Acute blood loss anemia.  The source of his blood loss is not clear.  It   may be gastrointestinal.  7.  Hyperkalemia.  8.  Leukopenia.  9.  Hyponatremia.  10.  Acute kidney injury.  11.  Elevated lipase.  12.  Positive troponin.  13.  Hypoglycemia.  14.  Diabetes mellitus type 2.  15.  History of systemic arterial hypertension.  16.  Gastroesophageal reflux disease.  17.  Dyslipidemia.  18.  Chronic back pain.    PLAN/MEDICAL DECISION MAKING:  This gentleman is critically ill.  He is going   to be admitted to the intensive care unit.  I am going to keep him intubated   on full mechanical ventilatory support.  Deep venous thrombosis prophylaxis   and stress ulcer prophylaxis will both be provided.  Anticoagulation is   strictly contraindicated because of his acute blood loss anemia.  He has a   coagulopathy.  He was receiving heparin in [place], but this was discontinued   prior to arriving here at [facility].  Serial neurologic exams will be performed.    I am going to culture his blood and sputum and empirically place him on   ampicillin/sulbactam as well as azithromycin pending culture results.  I am   going to follow his core temperature and externally warm him with a Carolynn   Hugger.  I am going to transfuse packed red blood cells and follow serial   hemoglobins and transfuse him as necessary.  I am going to place him on   Protonix 40 mg intravenously every 12 hours.  I am going to follow his   electrolytes very closely.  I am going to hydrate him with intravenous   crystalloid solution.  I am going to trend his troponins.  I am going to place   him on intravenous dextrose and follow his blood sugars very closely.  An   echocardiogram will be ordered.  At the current time, this  gentleman's   prognosis is quite guarded.  He is critically ill.  I have spent a total of 90   minutes providing direct critical care services at the bedside.  There has   been no time overlap.  The time spent excludes the time spent performing   procedures (83514, [id number]).    I have assessed and reassessed his respiratory status and made ventilator   adjustments based upon arterial blood gas analysis as well as analysis of   ventilator waveforms and airway mechanics.  I have assessed and reassessed his   neurologic status as well as his blood pressure, hemodynamics, and   cardiovascular status.  I am going to obtain a stat thromboelastogram with   platelet mapping.  Blood products will be transfused as necessary to correct   his coagulopathy.  He is at increased risk for worsening respiratory,   cardiovascular, and central nervous system dysfunction as well as death.    The case has been reviewed with the wife at the bedside, Dr. Siegel, nursing,   and respiratory therapy.    Thank you Dr. Siegel for allowing us to participate in the care of this   gentleman.  We will continue to follow him with great interest.       ____________________________________     MD JOHANA BAZAN / NTS    DD:  04/20/2018 04:28:27  DT:  04/20/2018 05:23:53    D#:  [id number]  Job#:  [id number]    cc: Ji Siegel MD

## 2018-04-20 NOTE — ASSESSMENT & PLAN NOTE
- Has required hemodialysis, now holding hemodialysis per nephrology  - Cr: improving slowly  - follow bmp closely

## 2018-04-20 NOTE — ASSESSMENT & PLAN NOTE
- Intubated 4/20, extubated 4/25  - Good sats on 3 L nasal cannula  - Additional recommendations for intensivist

## 2018-04-20 NOTE — PROGRESS NOTES
Cortrak Placement    Tube Team verified patient name and medical record number prior to tube placement.  Cortrak tube (43 inches, 10 Malagasy) placed at 75 cm in right nare.  Per Cortrak picture, tube appears to be in the stomach.  Nursing Instructions: Awaiting KUB to confirm placement before use for medications or feeding. Once placement confirmed, flush tube with 30 ml of water, and then remove and save stylet, in patient medication drawer.

## 2018-04-20 NOTE — ASSESSMENT & PLAN NOTE
- No sign of gross bleeding  - Repeat CBC in the morning  - Hemoglobin is stable  - Transfuse as needed

## 2018-04-20 NOTE — PROGRESS NOTES
Tele summary  Rhythm: SB/ SR/ Junctional  Rate: 50's-60's  CA: 0.20  QRS: 0.10  QT: 0.44    Ectopy: Rare PVC    Telemetry strips placed in pt chart.

## 2018-04-20 NOTE — DIETARY
"Nutrition Support Assessment     Nutrition services:   Day 0 of admit.  Jose Connor is a 77 y.o. male with admitting DX of Acute respiratory failure with hypoxia     Current problem list (copied from PMA assessment):  1.  Acute hypoxemic respiratory failure.  2.  Ventilator-dependent respiratory failure.  3.  Acute unspecified encephalopathy.  CT scan of the head in [place] is negative for acute pathology.  MRI done yesterday was negative for acute pathology.  4.  Query pneumonia.  5.  Hypothermia.  6.  Acute blood loss anemia.  The source of his blood loss is not clear.  It may be gastrointestinal.  7.  Hyperkalemia.  8.  Leukopenia.  9.  Hyponatremia.  10.  Acute kidney injury.  11.  Elevated lipase.  12.  Positive troponin.  13.  Hypoglycemia.  14.  Diabetes mellitus type 2.  15.  History of systemic arterial hypertension.  16.  Gastroesophageal reflux disease.  17.  Dyslipidemia.  18.  Chronic back pain.     Assessment:  Estimated Nutritional Needs based on:   Height: 175.3 cm (5' 9\")  Weight: 84.6 kg (186 lb 8.2 oz)  Ideal Body Weight: 72.6 kg (160 lb)  Percent Ideal Body Weight: 116.6  Body mass index is 27.54 kg/m².     Calculation/Equation: REE per MSJ x1.2 = 1881 kcal/day; RMR per PSU (vent L/min 10.8, T max/24 hours 36.7) = 1755 kcal/day  Total Calories / day: 1750 - 2000 (Calories / k - 24)  Total Grams Protein / day: 102 (Grams Protein / k.2)     Evaluation:   1. Pt is intubated and unable to take PO diet.  2. Cortrak now in place, await confirmation.   3. Labs reviewed. Renal function shows improvement.   4. Meds: Epinephrine @ 7 mcg/min, insulin, LR @ 200 ml/hr, propofol @ 15 mcg/kg/min (8.8 ml/hr = 232 kcal/day), bowel meds  5. Low-carbohydrate TF formula(s) will be appropriate.  6. Last BM PTA. Hypoactive BS noted per RN in Rounds.      Malnutrition Risk: No nutritional admit screen completed.     Recommendations/Plan:  1. With propofol: Impact Peptide 1.5 @ 45 ml/hr to provide 1620 kcal " (+Kcal from propofol), 102 grams protein, and 832 ml free water per day.  2. Without propofol: Diabetisource AC @ 70 ml/hr to provide 2016 kcal, 101 grams protein, and 1378 ml free water per day.  3. Fluids per MD.  4. Bowel regimen.    RD following.

## 2018-04-20 NOTE — H&P
Hospital Medicine History and Physical    Date of Service  4/20/2018    Chief Complaint  Altered mentation     History of Presenting Illness  77 y.o. male with history of diabetes, gastroesophageal reflux disease, as well as essential hypertension, was in his usual state of health until the day prior to admission. He awoke, with right-sided facial droop, and slurred speech, concerning for possible stroke. His wife did assorted examination on him, and found him to have no other signs of weakness, and brought him to a local doctor. At that time, he was diagnosed with possible Bell's palsy, after an MRI which was obtained was negative for acute ischemia. He was discharged home, where he began to act altered, unable to really respond to his wife. He was subsequently taken to a local emergency department, was found at that time to have elevated troponin, and out of concern for underlying MI, was started on heparin infusion.  He was then flown to this facility for higher level of care. In route, patient became very agitated, and was given large doses of ketamine for this. He subsequently required intubation with mechanical ventilation on arrival.     Primary Care Physician  Pcp Not In Computer    Consultants  Pulmonary critical care    Code Status  Full code    Review of Systems  Review of Systems   Unable to perform ROS: Intubated        Past Medical History  Past Medical History:   Diagnosis Date   • Pain 4/15/14    0/10   • Pain 7/3/12    shoulders and right arm   • Arthritis     gout   • Cholesterol blood decreased    • Diabetes     oral medication   • Heart burn    • Hiatus hernia syndrome    • Hypertension        Surgical History  Past Surgical History:   Procedure Laterality Date   • LUMBAR DECOMPRESSION  4/21/2014    Performed by Francisco Pope M.D. at SURGERY [facility] ORS   • CERVICAL DISK AND FUSION ANTERIOR  7/11/2012    Performed by FRANCISCO POPE at SURGERY [facility] ORS   • OTHER  1991    neck  surgery       Medications  No current facility-administered medications on file prior to encounter.      Current Outpatient Prescriptions on File Prior to Encounter   Medication Sig Dispense Refill   • carvedilol (COREG) 12.5 MG TABS Take 1 Tab by mouth 2 times a day, with meals. 60 Tab 11   • aspirin EC (ECOTRIN) 81 MG TBEC Take 1 Tab by mouth every day. 90 Tab 3   • metformin (GLUCOPHAGE) 500 MG TABS Take 500 mg by mouth 2 times a day, with meals.     • omeprazole (PRILOSEC) 40 MG capsule Take 40 mg by mouth 2 Times a Day.     • lisinopril (PRINIVIL) 20 MG TABS Take 20 mg by mouth every evening.     • allopurinol (ZYLOPRIM) 300 MG TABS Take 150 mg by mouth every morning. Takes 1/2 tab daily     • simvastatin (ZOCOR) 20 MG TABS Take 40 mg by mouth every evening.         Family History  Family history could not be obtained from patient due to critical illness    Social History  Social History   Substance Use Topics   • Smoking status: Former Smoker     Packs/day: 1.00     Years: 10.00     Types: Cigarettes     Quit date: 1965   • Smokeless tobacco: Never Used   • Alcohol use No       Allergies  Allergies   Allergen Reactions   • Hydrocodone      Passes out   • Oxycodone      Passes out        Physical Exam  Laboratory   Hemodynamics  Temp (24hrs), Av.9 °C (94.9 °F), Min:32.4 °C (90.4 °F), Max:36.6 °C (97.9 °F)   Temperature: (!) 35.8 °C (96.4 °F)  Pulse  Av.7  Min: 37  Max: 69 Heart Rate (Monitored): (!) 47  Blood Pressure : (!) 83/44, NIBP: (!) 88/42      Respiratory  Gibson Vent Mode: (S)CMV, Rate (breaths/min): 26, PEEP/CPAP: 8, PEEP/CPAP: 8, FiO2: 80, P Peak (PIP): 25, P MEAN: 13   Respiration: (!) 26, Pulse Oximetry: 100 %        RUL Breath Sounds: Diminished, RML Breath Sounds: Diminished, RLL Breath Sounds: Diminished, CATALINA Breath Sounds: Diminished, LLL Breath Sounds: Diminished    Physical Exam   HENT:   Head: Normocephalic and atraumatic.   Eyes: Conjunctivae are normal. Pupils are equal,  round, and reactive to light.   Neck: Normal range of motion. Neck supple. No tracheal deviation present. No thyromegaly present.   Cardiovascular: Normal rate, regular rhythm and normal heart sounds.  Exam reveals no gallop and no friction rub.    No murmur heard.  Pulmonary/Chest: Effort normal and breath sounds normal. No respiratory distress. He has no wheezes.   Abdominal: Soft. Bowel sounds are normal. He exhibits no distension and no mass. There is no tenderness. There is no rebound and no guarding.   Musculoskeletal: Normal range of motion. He exhibits no edema.   Lymphadenopathy:     He has no cervical adenopathy.   Neurological: No cranial nerve deficit.   Intubated and sedated   Skin: No rash noted. He is diaphoretic. No erythema.   Nursing note and vitals reviewed.      Recent Labs      04/20/18   0000  04/20/18   0220   WBC  3.9*  2.6*   RBC  2.33*  1.83*   HEMOGLOBIN  7.3*  5.7*   HEMATOCRIT  22.3*  17.4*   MCV  95.7  94.5   MCH  31.3  30.6   MCHC  32.7*  32.4*   RDW  60.7*  59.7*   PLATELETCT  239  184   MPV  10.3  10.5     Recent Labs      04/20/18   0000   SODIUM  130*   POTASSIUM  6.0*   CHLORIDE  108   CO2  13*   GLUCOSE  60*   BUN  57*   CREATININE  2.82*   CALCIUM  8.2*     Recent Labs      04/20/18   0000   ALTSGPT  21   ASTSGOT  34   ALKPHOSPHAT  184*   TBILIRUBIN  0.3   LIPASE  237*   GLUCOSE  60*     Recent Labs      04/20/18   0000   APTT  >240.0*   INR  2.34*             Lab Results   Component Value Date    TROPONINI 0.41 (H) 04/20/2018     Urinalysis:  No results found for: SPECGRAVITY, GLUCOSEUR, KETONES, NITRITE, WBCURINE, RBCURINE, BACTERIA, EPITHELCELL     Imaging  Chest radiograph was supportive devices in place, mild pulmonary edema also noted.    Assessment/Plan     I anticipate this patient will require at least two midnights for appropriate medical management, necessitating inpatient admission.    * Acute respiratory failure (CMS-HCC)   Assessment & Plan    Hypoxemic, status  post intubation and mechanical ventilation.  Monitor.         Acute on chronic kidney failure (CMS-HCC)   Assessment & Plan    Likely due to hypovolemia in the setting of blood loss, dehydration.          Acute blood loss anemia   Assessment & Plan    Unclear etiology, possible gastrointestinal hemorrhage, versus other internal hemorrhage in the setting of flailing/agitation while being on heparin infusion.  Will monitor H/H, TEG is pending.  Transfuse 2 units PRBC        Hypothermia   Assessment & Plan    Suspect due to underlying infection/blood loss.  Active warming measures ongoing.          Diabetes mellitus type 2, controlled (CMS-HCC)- (present on admission)   Assessment & Plan    With current hypoglycemia.  Monitor.         GERD (gastroesophageal reflux disease)- (present on admission)   Assessment & Plan    Chronic.          Dyslipidemia- (present on admission)   Assessment & Plan    On statin therapy.  Monitor.         HTN (hypertension)- (present on admission)   Assessment & Plan    Currently with relative hypotension.  Monitor.             VTE prophylaxis: SCD,  Hold anticoagulation given likely underlying bleed, severe anemia. .

## 2018-04-20 NOTE — DIETARY
Nutrition Services: Consult metabolic cart study    Pt is a 77 y.o. Male with Dx: Acute respiratory failure with hypoxia (CMS-HCC)    Admit day 0. Pt transferred from outside facility, now intubated.  Pt is NPO. ?GIB. Hypoactive BS.    RD following for orders. Will request metabolic cart study from RT as indicated.

## 2018-04-21 PROBLEM — R57.9 SHOCK CIRCULATORY (HCC): Status: ACTIVE | Noted: 2018-01-01

## 2018-04-21 PROBLEM — G93.40 ENCEPHALOPATHY: Status: ACTIVE | Noted: 2018-01-01

## 2018-04-21 PROBLEM — G11.9 CEREBRAL ATAXIA (HCC): Status: ACTIVE | Noted: 2018-01-01

## 2018-04-21 NOTE — PROCEDURES
A time-out was completed verifying correct patient, procedure, site, positioning, and special equipment if applicable. The patient was placed in a dependent position appropriate for central line placement based on the vein to be cannulated. The patient’s left neck was prepped and draped in sterile fashion. A triple lumen 9-Arabic Cordis catheter was introduced into the the internal jugular vein using the Seldinger technique and under ultrasound guidance. The catheter was threaded smoothly over the guide wire and appropriate blood return was obtained. Each lumen of the catheter was evacuated of air and flushed with sterile saline. The catheter was then sutured in place to the skin and a sterile dressing applied.   Estimated Blood Loss: N/A  The patient tolerated the procedure well and there were no complications.

## 2018-04-21 NOTE — CARE PLAN
Problem: Ventilation Defect:  Goal: Ability to achieve and maintain unassisted ventilation or tolerate decreased levels of ventilator support    Intervention: Manage ventilation therapy by monitoring diagnostic test results  Adult Ventilation Update    Total Vent Days: 2    Patient Lines/Drains/Airways Status    Active Airway     Name: Placement date: Placement time: Site: Days:    Airway Group ET Tube Oral 8.0 04/20/18   0206   Oral   1               Plateau Pressure (Q Shift): 15 (04/20/18 1903)  Static Compliance (ml / cm H2O): 64 (04/20/18 2249)    Patient failed trials because of Barriers to Wean: No Order (04/20/18 1125)  Barriers to SBT    Length of Weaning Trial         Sputum/Suction   Cough: Congested;Productive;Weak (04/21/18 0241)  Sputum Amount: Large (04/21/18 0241)  Sputum Color: Bloody (04/21/18 0241)  Sputum Consistency: Thick (04/21/18 0241)    Mobility Group  Activity Performed: Unable to mobilize (04/20/18 2000)  Reason Not Mobilized: Other (comment) (RASS -3) (04/20/18 2000)    Events/Summary/Plan: Vent check, inline med (04/20/18 1600)

## 2018-04-21 NOTE — PROGRESS NOTES
"CARDIOLOGY PROGRESS NOTE    77-year-old male with hypertension, hyperlipidemia and diabetes presented with slurred speech. Patient was sent for an outpatient MRI which per report was normal. As per patient's wife, patient was having diarrhea, he was confused and not responding normally. She then called 911. Patient was taken to the local hospital where per report he was noted to be bradycardic in the 20s. Patient was given atropine after which patient's heart rate improved along with his blood pressure and mentation. Troponin was found to be elevated and the patient was transferred to our hospital. On arrival, per report patient was confused and was not responding to questions. He was also found to be hypoxic and acidemic, now status post intubation.     Interval history: intubated, with electrolyte abnormalities. K is 6.5, creat is up, 3.18. Troponin trended down from 0.4 to 0.36      ROS:   ROS  Unable to obtain: intubated         Current Outpatient Medications:  Home Medications     Reviewed by Payam Leon (Pharmacy Tech) on 04/20/18 at 1211  Med List Status: Complete   Medication Last Dose Status   allopurinol (ZYLOPRIM) 300 MG TABS 4/19/2018 Active   aspirin EC (ECOTRIN) 81 MG TBEC 4/19/2018 Active   gemfibrozil (LOPID) 600 MG Tab 4/19/2018 Active   lisinopril (PRINIVIL) 10 MG Tab 4/18/2018 Active   lisinopril (PRINIVIL) 20 MG TABS 4/19/2018 Active   metformin (GLUCOPHAGE) 500 MG TABS 4/19/2018 Active                  Medication Allergy/Sensitivities:  Allergies   Allergen Reactions   • Hydrocodone      Passes out   • Oxycodone      Passes out           Physical Exam     Vitals:    04/21/18 0845 04/21/18 0900 04/21/18 0915 04/21/18 0930   BP:       Pulse: 62 60 65 64   Resp: (!) 26 (!) 26 (!) 26 (!) 24   Temp:       SpO2: 97% 95% 95% 94%   Weight:       Height:         Body mass index is 29.69 kg/m².  /56   Pulse 64   Temp (!) 32.9 °C (91.2 °F)   Resp (!) 24   Ht 1.753 m (5' 9\")   Wt 91.2 kg " (201 lb 1 oz)   SpO2 94%   BMI 29.69 kg/m²   O2 therapy: Pulse Oximetry: 94 %, FIO2%: 40, O2 Delivery: Ventilator    Physical Exam  General; elderly male laying on the bed. Unresponsive   HEENT: AT/NC, PERRLA, EOMI, neck supple, MMM  CHest: CTAB  CVS: RRR, S1S2 no m/r/g, no JVP, HJR, pedal edema  Abdomen: soft non tender  Neuro: intubated, unable to assess   Extremity: no e/c/c        Lab Data Review:  Recent Results (from the past 24 hour(s))   ACCU-CHEK GLUCOSE    Collection Time: 04/20/18 11:05 AM   Result Value Ref Range    Glucose - Accu-Ck 166 (H) 65 - 99 mg/dL   ECHOCARDIOGRAM COMP W/O CONT    Collection Time: 04/20/18  1:52 PM   Result Value Ref Range    Eject.Frac. MOD BP 73.13     Eject.Frac. MOD 4C 78.63     Eject.Frac. MOD 2C 68.83     Left Ventrical Ejection Fraction 65    TROPONIN    Collection Time: 04/20/18  2:40 PM   Result Value Ref Range    Troponin I 0.36 (H) 0.00 - 0.04 ng/mL   PLATELET MAPPING WITH BASIC TEG    Collection Time: 04/20/18  2:40 PM   Result Value Ref Range    Reaction Time Initial-R 8.7 5.0 - 10.0 min    Clot Kinetics-K 1.4 1.0 - 3.0 min    Clot Angle-Angle 71.2 53.0 - 72.0 degrees    Maximum Clot Strength-MA 76.7 (H) 50.0 - 70.0 mm    Lysis 30 minutes-LY30 0.0 0.0 - 8.0 %    % Inhibition ADP 34.9 %    % Inhibition AA 40.9 %    TEG Algorithm Link Algorithm    COMP METABOLIC PANEL    Collection Time: 04/20/18  2:40 PM   Result Value Ref Range    Sodium 134 (L) 135 - 145 mmol/L    Potassium 4.9 3.6 - 5.5 mmol/L    Chloride 106 96 - 112 mmol/L    Co2 14 (L) 20 - 33 mmol/L    Anion Gap 14.0 (H) 0.0 - 11.9    Glucose 130 (H) 65 - 99 mg/dL    Bun 54 (H) 8 - 22 mg/dL    Creatinine 2.78 (H) 0.50 - 1.40 mg/dL    Calcium 8.2 (L) 8.5 - 10.5 mg/dL    AST(SGOT) 35 12 - 45 U/L    ALT(SGPT) 19 2 - 50 U/L    Alkaline Phosphatase 138 (H) 30 - 99 U/L    Total Bilirubin 0.3 0.1 - 1.5 mg/dL    Albumin 2.9 (L) 3.2 - 4.9 g/dL    Total Protein 5.6 (L) 6.0 - 8.2 g/dL    Globulin 2.7 1.9 - 3.5 g/dL     A-G Ratio 1.1 g/dL   CBC WITH DIFFERENTIAL    Collection Time: 04/20/18  2:40 PM   Result Value Ref Range    WBC 9.1 4.8 - 10.8 K/uL    RBC 2.61 (L) 4.70 - 6.10 M/uL    Hemoglobin 8.3 (L) 14.0 - 18.0 g/dL    Hematocrit 23.9 (L) 42.0 - 52.0 %    MCV 91.6 81.4 - 97.8 fL    MCH 31.8 27.0 - 33.0 pg    MCHC 34.7 33.7 - 35.3 g/dL    RDW 56.7 (H) 35.9 - 50.0 fL    Platelet Count 268 164 - 446 K/uL    MPV 10.2 9.0 - 12.9 fL    Neutrophils-Polys 87.90 (H) 44.00 - 72.00 %    Lymphocytes 5.90 (L) 22.00 - 41.00 %    Monocytes 5.40 0.00 - 13.40 %    Eosinophils 0.20 0.00 - 6.90 %    Basophils 0.20 0.00 - 1.80 %    Immature Granulocytes 0.40 0.00 - 0.90 %    Nucleated RBC 0.30 /100 WBC    Neutrophils (Absolute) 7.97 (H) 1.82 - 7.42 K/uL    Lymphs (Absolute) 0.54 (L) 1.00 - 4.80 K/uL    Monos (Absolute) 0.49 0.00 - 0.85 K/uL    Eos (Absolute) 0.02 0.00 - 0.51 K/uL    Baso (Absolute) 0.02 0.00 - 0.12 K/uL    Immature Granulocytes (abs) 0.04 0.00 - 0.11 K/uL    NRBC (Absolute) 0.03 K/uL   LDH    Collection Time: 04/20/18  2:40 PM   Result Value Ref Range    LDH Total 174 107 - 266 U/L   ESTIMATED GFR    Collection Time: 04/20/18  2:40 PM   Result Value Ref Range    GFR If  27 (A) >60 mL/min/1.73 m 2    GFR If Non African American 22 (A) >60 mL/min/1.73 m 2   ACCU-CHEK GLUCOSE    Collection Time: 04/20/18  6:31 PM   Result Value Ref Range    Glucose - Accu-Ck 103 (H) 65 - 99 mg/dL   ACCU-CHEK GLUCOSE    Collection Time: 04/21/18 12:29 AM   Result Value Ref Range    Glucose - Accu-Ck 88 65 - 99 mg/dL   HEMOGLOBIN AND HEMATOCRIT    Collection Time: 04/21/18  1:04 AM   Result Value Ref Range    Hemoglobin 8.8 (L) 14.0 - 18.0 g/dL    Hematocrit 25.6 (L) 42.0 - 52.0 %   ISTAT ARTERIAL BLOOD GAS    Collection Time: 04/21/18  4:54 AM   Result Value Ref Range    Ph 7.344 (L) 7.400 - 7.500    Pco2 24.8 (L) 26.0 - 37.0 mmHg    Po2 111 (H) 64 - 87 mmHg    Tco2 14 (L) 20 - 33 mmol/L    S02 98 93 - 99 %    Hco3 13.5 (L) 17.0 -  25.0 mmol/L    BE -11 (L) -4 - 3 mmol/L    Body Temp 35.4 C degrees    O2 Therapy 40 %    Ph Temp Greg 7.367 (L) 7.400 - 7.500    Pco2 Temp Co 23.1 (L) 26.0 - 37.0 mmHg    Po2 Temp Cor 102 (H) 64 - 87 mmHg    Specimen Arterial     Action Range Triggered NO     Inst. Qualified Patient YES    CBC with Differential    Collection Time: 04/21/18  4:58 AM   Result Value Ref Range    WBC 10.7 4.8 - 10.8 K/uL    RBC 2.65 (L) 4.70 - 6.10 M/uL    Hemoglobin 8.3 (L) 14.0 - 18.0 g/dL    Hematocrit 24.1 (L) 42.0 - 52.0 %    MCV 90.9 81.4 - 97.8 fL    MCH 31.3 27.0 - 33.0 pg    MCHC 34.4 33.7 - 35.3 g/dL    RDW 57.1 (H) 35.9 - 50.0 fL    Platelet Count 267 164 - 446 K/uL    MPV 10.0 9.0 - 12.9 fL    Neutrophils-Polys 91.00 (H) 44.00 - 72.00 %    Lymphocytes 3.60 (L) 22.00 - 41.00 %    Monocytes 4.80 0.00 - 13.40 %    Eosinophils 0.00 0.00 - 6.90 %    Basophils 0.10 0.00 - 1.80 %    Immature Granulocytes 0.50 0.00 - 0.90 %    Nucleated RBC 0.20 /100 WBC    Neutrophils (Absolute) 9.71 (H) 1.82 - 7.42 K/uL    Lymphs (Absolute) 0.38 (L) 1.00 - 4.80 K/uL    Monos (Absolute) 0.51 0.00 - 0.85 K/uL    Eos (Absolute) 0.00 0.00 - 0.51 K/uL    Baso (Absolute) 0.01 0.00 - 0.12 K/uL    Immature Granulocytes (abs) 0.05 0.00 - 0.11 K/uL    NRBC (Absolute) 0.02 K/uL   Basic Metabolic Panel (BMP)    Collection Time: 04/21/18  4:58 AM   Result Value Ref Range    Sodium 131 (L) 135 - 145 mmol/L    Potassium 6.5 (H) 3.6 - 5.5 mmol/L    Chloride 105 96 - 112 mmol/L    Co2 14 (L) 20 - 33 mmol/L    Glucose 177 (H) 65 - 99 mg/dL    Bun 54 (H) 8 - 22 mg/dL    Creatinine 3.18 (H) 0.50 - 1.40 mg/dL    Calcium 7.8 (L) 8.5 - 10.5 mg/dL    Anion Gap 12.0 (H) 0.0 - 11.9   Magnesium    Collection Time: 04/21/18  4:58 AM   Result Value Ref Range    Magnesium 2.0 1.5 - 2.5 mg/dL   Phosphorus    Collection Time: 04/21/18  4:58 AM   Result Value Ref Range    Phosphorus 5.9 (H) 2.5 - 4.5 mg/dL   ESTIMATED GFR    Collection Time: 04/21/18  4:58 AM   Result Value Ref  Range    GFR If  23 (A) >60 mL/min/1.73 m 2    GFR If Non  19 (A) >60 mL/min/1.73 m 2   ACCU-CHEK GLUCOSE    Collection Time: 04/21/18  5:49 AM   Result Value Ref Range    Glucose - Accu-Ck 178 (H) 65 - 99 mg/dL       Imaging/Procedures Review:    ndependant Imaging Review: Completed  DX-CHEST-PORTABLE (1 VIEW)   Final Result      Worsening bilateral opacities suggesting increasing pulmonary edema.      CT-ABDOMEN W/O   Final Result      1.  No evidence of retroperitoneal hemorrhage.      2.  Bibasilar atelectasis and small bilateral pleural effusions.      3.  No evidence of bowel obstruction or focal inflammatory change.      4.  Small amount of ascites.      ECHOCARDIOGRAM COMP W/O CONT   Final Result      DX-ABDOMEN FOR TUBE PLACEMENT   Final Result      Enteric tube projects over the stomach.      DX-CHEST-PORTABLE (1 VIEW)   Final Result      Findings consistent with mild pulmonary edema. Endotracheal tube and central venous line in place as noted above.              Assessment/Plan   # Bradycardia  - HR in 40s  - likely metabolic. Unable to ascertain till his underlying metabolic abnormalities are corrected.  - will continue to follow     # CAR   - creatinine and potassium up   - consider nephro consult    Thank you for this interesting consult.     Nydia Avilez M.D.  PGY 2  Attending addendum/additions to follow.

## 2018-04-21 NOTE — PROGRESS NOTES
"Pulmonary Critical Care Progress Note        Date of Admission:  4/21/2018    Chief Complaint: ALOC    History of Present Illness: 77-year-old  lives in [place], [place].    Apparently yesterday, he began to have slurred   speech and some right-sided facial droop.  The wife took him to the hospital.    He had an MRI which apparently was normal.  This morning the wife noted him   to be somewhat confused and acting \"spacey.\"  He presented back to the   hospital in [place].  He was reportedly bradycardic into the 20s.  He was given   atropine with improvement in his heart rate and mentation.  He was transferred   to [facility] for subspecialty care.  After arriving at [facility], he has developed   hypoxemia.  He was somewhat combative.  He was requiring increasing amounts of   oxygen and he was intubated.  He has also been found to be hypoglycemic.     Dextrose was administered.  He also had elevated potassium and he has received   calcium.  A rectal temperature reveals that his temperature is 90 degrees and   he is now being externally warmed with a Carolynn Hugger.    ROS:  Respiratory: unable to perform due to the patient's inability to effectively communicate, Cardiac: unable to perform due to the patient's inability to effectively communicate, GI: unable to perform due to the patient's inability to effectively communicate.  All other systems negative.    Interval Events:  24 hour interval history reviewed    - arouses, not following   - vasopressin, norepi   - Tm 100F   - bobo TF at goal   - oliguric   - K up,    - vent day 2, 8, 40%   - change to PO PPI   - holding DVT P   - Unasyn/azithro, BAL neg    - echo P   - renal fxn worse; d/w renal   - s/p LR    - change HH to q 12, no evidence of active bleed  Yesterday:   - arouses, f/c's, on propofol   - epi gtt 3 mcg/min   -    - CVP 14-15   - vent day 1, full    - heparin gtt stopped pta   - Hgb up to 8.7 after PRBCs, s/p FFP   - CAR noted; K up   - check echo    - CT abd/pelvis " today, ? reto bleed    - hypothermic 32.9 - re-warming   - PPI     PFSH:  No change.    Respiratory:  Gibson Vent Mode: APVCMV, Rate (breaths/min): 26, Vt Target (mL): 450, PEEP/CPAP: 8, FiO2: 40, Control VTE (exp VT): 470  Pulse Oximetry: 100 %  Chest Tube Drains:          Exam: rhonchi bibasilar and diminished breath sounds moderate  ImagingAvailable data reviewed   Recent Labs      04/20/18   0125  04/20/18   0236  04/20/18   0300  04/21/18   0454   [id number]  7.14*   --    --    --    [id number]  38.6*   --    --    --    [id number]  65.6   --    --    --    [id number]  85.1*   --    --    --    ARTHCO3  13*   --    --    --    ARTBE  -15*   --    --    --    ISTATAPH   --   7.203*  7.263*  7.344*   ISTATAPCO2   --   40.2*  34.9  24.8*   ISTATAPO2   --   41*  245*  111*   ISTATATCO2   --   17*  17*  14*   THIOFNX7ATF   --   66*  100*  98   ISTATARTHCO3   --   15.8*  15.8*  13.5*   ISTATARTBE   --   -11*  -10*  -11*   ISTATTEMP   --   90.4 F  90.6 F  35.4 C   ISTATFIO2   --   80  80  40   ISTATSPEC   --   Arterial  Arterial  Arterial   ISTATAPHTC   --   7.264*  7.325*  7.367*   [id number]   --   30*  225*  102*       HemoDynamics:  Pulse: (!) 48, Heart Rate (Monitored): (!) 54  NIBP: 145/53  CVP (mm Hg): (!) 20 MM HG    Exam: regular rate and rhythm  Imaging: Available data reviewed  Recent Labs      04/20/18   0000  04/20/18   0545  04/20/18   1440   TROPONINI  0.41*  0.40*  0.36*       Neuro:  GCS Total Valdemar Coma Score: 10       Exam: Heavily sedated  Imaging: Available data reviewed    Fluids:  Intake/Output       04/19/18 0700 - 04/20/18 0659 04/20/18 0700 - 04/21/18 0659 04/21/18 0700 - 04/22/18 0659      0700-1859 1900-0659 Total 0700-1859 1900-0659 Total 0700-1859 1900-0659 Total       Intake    I.V.  --  57.4 57.4  281.2  632.6 913.8  --  -- --    Vasopressin Volume -- -- -- 0 73.1 73.1 -- -- --    Phenylephrine Volume -- 1 1 -- -- -- -- -- --    Propofol Volume -- -- -- 70.9 166.6 237.5 -- -- --     Norepinephrine Volume -- -- -- 37.5 353.5 391 -- -- --    Epinephrine Volume -- 56.4 56.4 172.8 39.5 212.2 -- -- --    Blood  --  885 885  700  -- 700  --  -- --    Volume (RELEASE RED BLOOD CELLS) -- 275 275 -- -- -- -- -- --    Volume (RELEASE FRESH FROZEN PLASMA) -- 260 260 -- -- -- -- -- --    Volume (RELEASE RED BLOOD CELLS) -- 350 350 -- -- -- -- -- --    Volume (RELEASE FRESH FROZEN PLASMA) -- -- -- 700 -- 700 -- -- --    Total Intake -- 942.4 942.4 981.2 632.6 1613.8 -- -- --       Output    Urine  --  300 300  215  180 395  --  -- --    Indwelling Cathether -- 300 300 215 180 395 -- -- --    Drains  --  -- --  --  0 0  --  -- --    Residual Amount (ml) (Discarded) -- -- -- -- 0 0 -- -- --    Total Output -- 300 300 215 180 395 -- -- --       Net I/O     -- 642.4 642.4 766.2 452.6 1218.8 -- -- --        Weight: 91.2 kg (201 lb 1 oz)  Recent Labs      04/20/18   0000  04/20/18   0342  04/20/18   0545  04/20/18   1440  04/21/18   0458   SODIUM  130*  134*   --   134*  131*   POTASSIUM  6.0*  5.3   --   4.9  6.5*   CHLORIDE  108  110   --   106  105   CO2  13*  14*   --   14*  14*   BUN  57*  53*   --   54*  54*   CREATININE  2.82*  2.60*   --   2.78*  3.18*   MAGNESIUM  2.7*   --   2.4   --   2.0   PHOSPHORUS  7.2*   --   5.6*   --   5.9*   CALCIUM  8.2*  8.0*   --   8.2*  7.8*       GI/Nutrition:  Exam: abdomen is soft and non-tender  Imaging: Available data reviewed  NPO  Liver Function  Recent Labs      04/20/18   0000  04/20/18   0342  04/20/18   1440  04/21/18   0458   ALTSGPT  21 17  19   --    ASTSGOT  34  30  35   --    ALKPHOSPHAT  184*  145*  138*   --    TBILIRUBIN  0.3  0.3  0.3   --    LIPASE  237*   --    --    --    GLUCOSE  60*  136*  130*  177*       Heme:  Recent Labs      04/20/18   0000  04/20/18   0220  04/20/18   0342   04/20/18   1440  04/21/18   0104  04/21/18   0458   RBC  2.33*  1.83*   --    --   2.61*   --   2.65*   HEMOGLOBIN  7.3*  5.7*   --    < >  8.3*  8.8*  8.3*   HEMATOCRIT   22.3*  17.4*   --    < >  23.9*  25.6*  24.1*   PLATELETCT  239  184   --    --   268   --   267   PROTHROMBTM  25.3*   --    --    --    --    --    --    APTT  >240.0*   --   214.5*   --    --    --    --    INR  2.34*   --    --    --    --    --    --     < > = values in this interval not displayed.       Infectious Disease:  Monitored Temp  Av.6 °C (99.7 °F)  Min: 37.3 °C (99.1 °F)  Max: 38 °C (100.4 °F)  Micro: reviewed  Recent Labs      18   0000  18   0220  18   0342  18   1440  18   0458   WBC  3.9*  2.6*   --   9.1  10.7   NEUTSPOLYS  75.40*  81.30*   --   87.90*  91.00*   LYMPHOCYTES  14.00*  13.30*   --   5.90*  3.60*   MONOCYTES  9.20  4.20   --   5.40  4.80   EOSINOPHILS  0.80  0.80   --   0.20  0.00   BASOPHILS  0.30  0.00   --   0.20  0.10   ASTSGOT  34   --   30  35   --    ALTSGPT  21   --   17  19   --    ALKPHOSPHAT  184*   --   145*  138*   --    TBILIRUBIN  0.3   --   0.3  0.3   --      Current Facility-Administered Medications   Medication Dose Frequency Provider Last Rate Last Dose   • EPINEPHrine 1 mg/mL(1:1000) 4 mg in  mL Infusion  0-10 mcg/min Continuous Ji Siegel M.D. 26.3 mL/hr at 18 1215 7 mcg/min at 185   • pantoprazole (PROTONIX) injection 40 mg  40 mg Q12HRS Manuel Lombardo M.D.   40 mg at 18   • Respiratory Care per Protocol   Continuous RT Manuel Lombardo M.D.       • chlorhexidine (PERIDEX) 0.12 % solution 15 mL  15 mL BID Manuel Lombardo M.D.   15 mL at 18   • fentaNYL (SUBLIMAZE) injection 25 mcg  25 mcg Q HOUR PRN Manuel Lombardo M.D.        Or   • fentaNYL (SUBLIMAZE) injection 50 mcg  50 mcg Q HOUR PRN Manuel Lombardo M.D.        Or   • fentaNYL (SUBLIMAZE) injection 100 mcg  100 mcg Q HOUR PRN Manuel Lombardo M.D.   100 mcg at 18 182   • ipratropium-albuterol (DUONEB) nebulizer solution 3 mL  3 mL Q2HRS PRN (RT) Manuel Lombardo M.D.        • ipratropium-albuterol (DUONEB) nebulizer solution 3 mL  3 mL Q4HRS (RT) Manuel Lombardo M.D.   3 mL at 04/21/18 0241   • insulin regular (HUMULIN R) injection 2-9 Units  2-9 Units Q6HRS Manuel Lombardo M.D.   2 Units at 04/21/18 0558    And   • glucose 4 g chewable tablet 16 g  16 g Q15 MIN PRN Manuel Lombardo M.D.        And   • dextrose 50% (D50W) injection 25 mL  25 mL Q15 MIN PRN Manuel Lombardo M.D.       • ampicillin/sulbactam (UNASYN) 3 g in  mL IVPB  3 g Q12HRS Manuel Lomabrdo M.D.   Stopped at 04/21/18 0333   • azithromycin (ZITHROMAX) 500 mg in D5W 250 mL IVPB  500 mg Q24HRS Manuel Lombardo M.D.   Stopped at 04/21/18 0512   • ondansetron (ZOFRAN) syringe/vial injection 4 mg  4 mg Q4HRS PRN Hernan Dallas M.D.       • propofol (DIPRIVAN) injection  0-80 mcg/kg/min Continuous Manuel Lombardo M.D.   Stopped at 04/21/18 0700   • lactated ringers infusion   Continuous Manuel Lombardo M.D.   Stopped at 04/21/18 0600   • Pharmacy Consult: Enteral tube feeding - review meds/change route/product selection  1 Each PRN Bryan Meza M.D.       • senna-docusate (PERICOLACE or SENOKOT S) 8.6-50 MG per tablet 2 Tab  2 Tab BID Stephon Trejo M.D.   2 Tab at 04/20/18 2120    And   • polyethylene glycol/lytes (MIRALAX) PACKET 1 Packet  1 Packet QDAY PRN Stephon Trejo M.D.        And   • magnesium hydroxide (MILK OF MAGNESIA) suspension 30 mL  30 mL QDAY PRN Stephon Trejo M.D.        And   • bisacodyl (DULCOLAX) suppository 10 mg  10 mg QDAY PRN Stephon Trejo M.D.       • acetaminophen (TYLENOL) tablet 650 mg  650 mg Q6HRS PRN Stephon Trejo M.D.   650 mg at 04/20/18 2123   • ondansetron (ZOFRAN ODT) dispertab 4 mg  4 mg Q4HRS PRN Stephon Trejo M.D.       • vasopressin (VASOSTRICT) 20 Units in  mL Infusion  0.03 Units/min Continuous Stephon Trejo M.D. 9 mL/hr at 04/21/18 0545 0.03 Units/min at 04/21/18 0545   • norepinephrine (LEVOPHED) 8 mg in NS  250 mL Infusion  0-30 mcg/min Continuous Stephon Trejo M.D. 18.8 mL/hr at 04/21/18 0700 10 mcg/min at 04/21/18 0700   • hydrocortisone sodium succinate PF (SOLU-CORTEF) 100 MG injection 100 mg  100 mg Q8HRS Stephon Trejo M.D.   100 mg at 04/21/18 0550     Last reviewed on 4/20/2018 12:11 PM by Alley Wolfe [facility]    Quality  Measures:   Labs reviewed, Medications reviewed and Radiology images reviewed                      Problems:  Acute hypoxemic respiratory failure   - full vent support; vent settings adjusted today   - not appropriate for liberation   - A- F bundle  Acute unspecified encephalopathy   - CT scan of the head in [place] is negative    - MRI negative for acute pathology   - ? Toxic/metabolic; w/u in progress, r/o sz  Hypotension, unspecified   - IVF, titrated vasopressors   - w/u as above  Query pneumonia  Hypothermia.  Acute blood loss anemia   - ? Source, doubt hemolysis, no evidence of GI loss   - CT  abdomen and pelvis - no retroperitoneal hemorrhage or ischemic bowel  Acute kidney injury   - I call Nephro   - he will need urgent RRT   - I placed HD catheter  Hyperkalemia   - Calcium, bicarb today   - will need RRT  Hyponatremia  Elevated lipase  Positive troponin   - suspect type 2/demand   - no evidence of ACS, trend  Hypoglycemia.  Diabetes mellitus type 2   - glycemic control  History of systemic arterial hypertension.  Gastroesophageal reflux disease.  Dyslipidemia.  Chronic back pain.    He remains very critically ill at high risk for further deterioration. I have adjusted vent settings, pressors and cc mgt as above.    Discussed patient condition and risk of morbidity and/or mortality with Family, RN, RT and QA team.    The patient remains critically ill. Additional critical care time = 42minutes in directly providing and coordinating critical care and extensive data review.  No time overlap and excludes procedures.

## 2018-04-21 NOTE — CONSULTS
DATE OF SERVICE:  04/21/2018    REQUESTING PHYSICIAN:  Dr. Bryan Meza.    REASON FOR CONSULTATION:  Hyperkalemia and acute kidney injury, assessing the   need for urgent dialysis.    Unfortunately, the patient is intubated, sedated, unable to provide any   history.  Most of the story was from reviewing the records and discussing the   case with Dr. Meza.    HISTORY OF PRESENT ILLNESS:  The patient is an unfortunate 77-year-old   gentleman who lives in [place], [place], who was brought to an outside hospital by   his family because of slurred speech, patient was diagnosed with possible   stroke.  He was transferred to [facility] and upon arrival,   developed hypoxia requiring intubation and placed him on mechanical   ventilation.  Also, the patient was hypoglycemic this morning, patient   developed severe hyperkalemia, also worsening creatinine.  His urine output   has been decreasing, there is questionable history of chronic kidney disease   according to the wife, but she is not sure what his baseline creatinine is.    Patient has no recent use of NSAIDs or IV contrast.    Past medical history, social history, family history, medication review, and   review of systems are unobtainable.  Please refer to the chart.    PHYSICAL EXAMINATION:  GENERAL:  Patient is intubated and unresponsive.  VITAL SIGNS:  Showed blood pressure of 133/60, heart rate was 70.  HEENT:  Patient has endotracheal tube.  NECK:  No lymphadenopathy.  CHEST:  Normal.  LUNGS:  Coarse breath sounds.  HEART:  S1, S2.  ABDOMEN:  Soft, benign.  EXTREMITIES:  There is +1 edema.  SKIN:  No skin rashes.  NEUROLOGIC:  Patient is sedated.    LABORATORY DATA:  His recent labs were reviewed.  I also reviewed the   abdominal CT scan images from yesterday.  There is no hydronephrosis.    ASSESSMENT AND PLAN:  1.  Acute kidney injury on chronic kidney disease.  The etiology is most   likely acute tubular necrosis.  2.  Hyperkalemia secondary to renal  failure as well as metabolic acidosis.  3.  Metabolic acidosis.  4.  Hyponatremia.  5.  Hypoalbuminemia.  6.  Hyperphosphatemia.  7.  Anemia.  8.  Respiratory failure.    PLAN:  1.  I had a long discussion with the patient's wife about the kidney disease,   hyperkalemia and the options of treatment.  I did recommend to start dialysis   to manage his renal failure as well as hyperkalemia.  I did explain to the   patient's wife who is a retired nurse the risks of hemodialysis included   bleeding, infection as well as death; the patient's wife agrees to start   dialysis.  I also discussed the case in length with Dr. Meza who agreed to   the plan.  We will get a hemodialysis catheter and we will start hemodialysis   today.  2.  Renal dose all medications.  3.  Avoid nephrotoxin.  4.  Prognosis is guarded.    Again, the plan was discussed in detail with Dr. Meza.       ____________________________________     FADI NAJJAR, MD FN / JERICHO    DD:  04/21/2018 13:43:13  DT:  04/21/2018 14:42:03    D#:  [id number]  Job#:  [id number]

## 2018-04-21 NOTE — PROGRESS NOTES
Witnessed Nephrologist explain to patient's wife benefits and risks of HD catheter insertion and HD therapy. Wife agreed to have the procedure.

## 2018-04-21 NOTE — PROCEDURES
"Date: 4/21/2018    Procedure:  16 cm Marhurkar HD Central Venous Catheter Insertion    Indication: CAR, hypotension     Physician:  Dr. Bryan Meza MD    Consent:  Obtained from NOK and included in the medical record; time out performed    Procedure:  The patient was placed in the [place] position.  Appropriate \"time out\" was performed.  The right neck was prepped and draped in the usual sterile fashion.  Under full body sterile barrier precautions, a 16 cm Marhurkar HD triple lumen central venous catheter was placed without difficulty in the right IJ position. The catheter was place over a guidewire after removal of an existing  triple lumen central venous catheter.  All lumens were flushed with sterile saline and sterile caps were applied.  The line was sutured in place and a sterile dressing was applied.  There were no immediate complications.  A post-procedure CXR will be reviewed.  Estimated blood loss < 5cc.      "

## 2018-04-22 NOTE — CARE PLAN
Problem: Ventilation Defect:  Goal: Ability to achieve and maintain unassisted ventilation or tolerate decreased levels of ventilator support    Intervention: Support and monitor invasive and noninvasive mechanical ventilation  Adult Ventilation Update    Total Vent Days: 3    Patient Lines/Drains/Airways Status    Active Airway     Name: Placement date: Placement time: Site: Days:    Airway Group ET Tube Oral 8.0 @26 04/20/18   0206   Oral   3            26 450 +8 40%    Events/Summary/Plan: Patient remains stable on vent, no changes made this shift, will continue to monitor.

## 2018-04-22 NOTE — PROGRESS NOTES
"CARDIOLOGY PROGRESS NOTE     77-year-old male with hypertension, hyperlipidemia and diabetes presented with slurred speech. Patient was sent for an outpatient MRI which per report was normal. As per patient's wife, patient was having diarrhea, he was confused and not responding normally. She then called 911. Patient was taken to the local hospital where per report he was noted to be bradycardic in the 20s. Patient was given atropine after which patient's heart rate improved along with his blood pressure and mentation. Troponin was found to be elevated and the patient was transferred to our hospital. On arrival, per report patient was confused and was not responding to questions. He was also found to be hypoxic and acidemic, now status post intubation.    Interval history: seizure episode x 1. Worsening renal function on HD now. HR improved after the HD and correction of underlying metabolic derangement        ROS:   ROS  Unable to obtain: intubated         Current Outpatient Medications:  Home Medications     Reviewed by Payam Leon (Pharmacy Tech) on 04/20/18 at 1211  Med List Status: Complete   Medication Last Dose Status   allopurinol (ZYLOPRIM) 300 MG TABS 4/19/2018 Active   aspirin EC (ECOTRIN) 81 MG TBEC 4/19/2018 Active   gemfibrozil (LOPID) 600 MG Tab 4/19/2018 Active   lisinopril (PRINIVIL) 10 MG Tab 4/18/2018 Active   lisinopril (PRINIVIL) 20 MG TABS 4/19/2018 Active   metformin (GLUCOPHAGE) 500 MG TABS 4/19/2018 Active                  Medication Allergy/Sensitivities:  Allergies   Allergen Reactions   • Hydrocodone      Passes out   • Oxycodone      Passes out           Physical Exam     Vitals:    04/22/18 0745 04/22/18 0800 04/22/18 0815 04/22/18 0830   BP:       Pulse: 69 66 63 67   Resp: (!) 23 20 (!) 22 (!) 23   Temp:       SpO2:       Weight:       Height:         Body mass index is 29.69 kg/m².  /56   Pulse 67   Temp (!) 32.9 °C (91.2 °F)   Resp (!) 23   Ht 1.753 m (5' 9\")   Wt " 91.2 kg (201 lb 1 oz)   SpO2 99%   BMI 29.69 kg/m²   O2 therapy: Pulse Oximetry: 99 %, FIO2%: 40, O2 Delivery: Ventilator    Physical Exam  General; elderly male laying on the bed. Unresponsive   HEENT: AT/NC, PERRLA, EOMI, neck supple, MMM  CHest: CTAB  CVS: RRR, S1S2 no m/r/g, no JVP, HJR, pedal edema  Abdomen: soft non tender  Neuro: intubated, unable to assess   Extremity: no e/c/c    Lab Data Review:  Recent Results (from the past 24 hour(s))   ACCU-CHEK GLUCOSE    Collection Time: 04/21/18  2:25 PM   Result Value Ref Range    Glucose - Accu-Ck 223 (H) 65 - 99 mg/dL   HEMOGLOBIN AND HEMATOCRIT    Collection Time: 04/21/18  3:25 PM   Result Value Ref Range    Hemoglobin 8.1 (L) 14.0 - 18.0 g/dL    Hematocrit 22.7 (L) 42.0 - 52.0 %   HEPATITIS PANEL ACUTE(4 COMPONENTS)    Collection Time: 04/21/18  3:25 PM   Result Value Ref Range    Hepatitis B Surface Antigen Negative Negative    Hepatitis C Antibody Negative Negative    Hepatitis B Cors Ab,IgM Negative Negative    Hepatitis A Virus Ab, IgM Negative Negative   HEP B SURFACE AB    Collection Time: 04/21/18  3:25 PM   Result Value Ref Range    Hep B Surface Antibody Quant 22.23 (H) 0.00 - 10.00 mIU/mL   CORTISOL    Collection Time: 04/21/18  3:25 PM   Result Value Ref Range    Cortisol 60.4 (H) 0.0 - 23.0 ug/dL   ACCU-CHEK GLUCOSE    Collection Time: 04/21/18  6:04 PM   Result Value Ref Range    Glucose - Accu-Ck 196 (H) 65 - 99 mg/dL   ACCU-CHEK GLUCOSE    Collection Time: 04/22/18 12:32 AM   Result Value Ref Range    Glucose - Accu-Ck 306 (H) 65 - 99 mg/dL   HEMOGLOBIN AND HEMATOCRIT    Collection Time: 04/22/18  1:43 AM   Result Value Ref Range    Hemoglobin 7.5 (L) 14.0 - 18.0 g/dL    Hematocrit 21.0 (L) 42.0 - 52.0 %   CBC with Differential    Collection Time: 04/22/18  5:12 AM   Result Value Ref Range    WBC 8.1 4.8 - 10.8 K/uL    RBC 2.37 (L) 4.70 - 6.10 M/uL    Hemoglobin 7.3 (L) 14.0 - 18.0 g/dL    Hematocrit 21.6 (L) 42.0 - 52.0 %    MCV 91.1 81.4 -  97.8 fL    MCH 30.8 27.0 - 33.0 pg    MCHC 33.8 33.7 - 35.3 g/dL    RDW 55.5 (H) 35.9 - 50.0 fL    Platelet Count 207 164 - 446 K/uL    MPV 10.6 9.0 - 12.9 fL    Neutrophils-Polys 89.10 (H) 44.00 - 72.00 %    Lymphocytes 5.90 (L) 22.00 - 41.00 %    Monocytes 4.30 0.00 - 13.40 %    Eosinophils 0.00 0.00 - 6.90 %    Basophils 0.20 0.00 - 1.80 %    Immature Granulocytes 0.50 0.00 - 0.90 %    Nucleated RBC 0.60 /100 WBC    Neutrophils (Absolute) 7.20 1.82 - 7.42 K/uL    Lymphs (Absolute) 0.48 (L) 1.00 - 4.80 K/uL    Monos (Absolute) 0.35 0.00 - 0.85 K/uL    Eos (Absolute) 0.00 0.00 - 0.51 K/uL    Baso (Absolute) 0.02 0.00 - 0.12 K/uL    Immature Granulocytes (abs) 0.04 0.00 - 0.11 K/uL    NRBC (Absolute) 0.05 K/uL   Basic Metabolic Panel (BMP)    Collection Time: 04/22/18  5:12 AM   Result Value Ref Range    Sodium 134 (L) 135 - 145 mmol/L    Potassium 4.2 3.6 - 5.5 mmol/L    Chloride 101 96 - 112 mmol/L    Co2 23 20 - 33 mmol/L    Glucose 300 (H) 65 - 99 mg/dL    Bun 43 (H) 8 - 22 mg/dL    Creatinine 2.54 (H) 0.50 - 1.40 mg/dL    Calcium 8.4 (L) 8.5 - 10.5 mg/dL    Anion Gap 10.0 0.0 - 11.9   Magnesium    Collection Time: 04/22/18  5:12 AM   Result Value Ref Range    Magnesium 2.0 1.5 - 2.5 mg/dL   Phosphorus    Collection Time: 04/22/18  5:12 AM   Result Value Ref Range    Phosphorus 4.2 2.5 - 4.5 mg/dL   TRIGLYCERIDE    Collection Time: 04/22/18  5:12 AM   Result Value Ref Range    Triglycerides 446 (H) 0 - 149 mg/dL   ESTIMATED GFR    Collection Time: 04/22/18  5:12 AM   Result Value Ref Range    GFR If African American 30 (A) >60 mL/min/1.73 m 2    GFR If Non African American 25 (A) >60 mL/min/1.73 m 2   ISTAT ARTERIAL BLOOD GAS    Collection Time: 04/22/18  5:17 AM   Result Value Ref Range    Ph 7.564 (H) 7.400 - 7.500    Pco2 24.9 (L) 26.0 - 37.0 mmHg    Po2 63 (L) 64 - 87 mmHg    Tco2 23 20 - 33 mmol/L    S02 95 93 - 99 %    Hco3 22.5 17.0 - 25.0 mmol/L    BE 1 -4 - 3 mmol/L    Body Temp 37.1 C degrees    O2  Therapy 40 %    Ph Temp Greg 7.562 (H) 7.400 - 7.500    Pco2 Temp Co 25.0 (L) 26.0 - 37.0 mmHg    Po2 Temp Cor 63 (L) 64 - 87 mmHg    Specimen Arterial     Action Range Triggered NO     Inst. Qualified Patient YES    ACCU-CHEK GLUCOSE    Collection Time: 04/22/18  5:47 AM   Result Value Ref Range    Glucose - Accu-Ck 306 (H) 65 - 99 mg/dL       Imaging/Procedures Review:    ndependant Imaging Review: Completed  DX-CHEST-PORTABLE (1 VIEW)   Final Result      BILATERAL atelectasis which could obscure an additional process. This is unchanged.      DX-CHEST-PORTABLE (1 VIEW)   Final Result      Left central line projects over the left innominate vein. Correlation with blood gas is recommended to exclude arterial placement.      Right central line projects over the SVC.      Perihilar and bibasilar opacities may represent a combination of atelectasis and edema. Superimposed infection not excluded.         DX-CHEST-PORTABLE (1 VIEW)   Final Result      Worsening bilateral opacities suggesting increasing pulmonary edema.      CT-ABDOMEN W/O   Final Result      1.  No evidence of retroperitoneal hemorrhage.      2.  Bibasilar atelectasis and small bilateral pleural effusions.      3.  No evidence of bowel obstruction or focal inflammatory change.      4.  Small amount of ascites.      ECHOCARDIOGRAM COMP W/O CONT   Final Result      DX-ABDOMEN FOR TUBE PLACEMENT   Final Result      Enteric tube projects over the stomach.      DX-CHEST-PORTABLE (1 VIEW)   Final Result      Findings consistent with mild pulmonary edema. Endotracheal tube and central venous line in place as noted above.              Assessment/Plan   # Bradycardia  - HR in 60s  - was likely metabolic. Improved with the HD.  - We dont think he needs the PPM for now     Cardiology will sign off for now. Please call us for any questions  Thank you for allowing to participate in his care     Nydia Avilez M.D.  PGY 2  Attending addendum/additions to follow.

## 2018-04-22 NOTE — ED TRIAGE NOTES
"CC:     \" Patient is admitted to the ER for altered mental status and confusion:     HPI:        Geeta michael who is 77 years old male with a past medical history of cerebellar ataxia, HTN, and Type 2 DM present to the ER on 2018 with altered mental status and confusion. Patient is now intubated and sedated. Most of the history was obtained at the bedside from his wife. She said on Thursday he had constipation due to not drinking enough water  and took milk of magnesia which make him diarrhea. Later that evening, his was confused and altered so his wife had to called 911. When brought to the [place] ER he was bradycardic with heart rate of 20. His troponin was elevated and BUN and creatinine as well. He had workup for facial droop at the medical facility at [facility] and had brain MRI done which was negative. After the MRI develop confusion and he was altered. They brought him in to the [facility] ER . Last night patient had a seizure and currently now going through dialysis. Patient is has hypertension and type 2 DM since the age of 29 but it has been controlled well controlled, as per wife. As per wife, patient BUN and creatine is chronically elevated and have had work up with urologist in the past with no identifiable pathology. Patient was advised to drink a lot of fluid. Patient also had cold 2-3 weeks ago and ear infection. Patient denies, headache, chest pain, and diarrhea or fever or weight loss.           PAST SURGICAL HISTORY:  He has had lumbar spine surgery and neck surgery.     Past medical history:  Diabetes mellitus type 2, systemic arterial hypertension,   gastroesophageal reflux disease, dyslipidemia, and chronic back pain.     SOCIAL HISTORY:  He is .  He lives in [place].  He does not smoke.     FAMILY HISTORY:  noncontributory.    Allergy:  TO HYDROCODONE AND OXYCODONE.    ROS Unable to obtain: intubated     Physical exam:      Monitored Temp: 36.5 °C (97.7 °F)  Pulse  Av.1  " Min: 37  Max: 80 Heart Rate (Monitored): (!) 55  NIBP: 131/59 CVP (mm Hg): (!) 15 MM HG     Respiratory  Gibson Vent Mode: APVCMV, Rate (breaths/min): 26, PEEP/CPAP: 8, PEEP/CPAP: 8, FiO2: 40, P Peak (PIP): 22, P MEAN: 12   Respiration: (!) 24, Pulse Oximetry: 97 %  Work Of Breathing / Effort: Vented  RUL Breath Sounds: Clear, RML Breath Sounds: Clear, RLL Breath Sounds: Diminished, CATALINA Breath Sounds: Clear, LLL Breath Sounds: Clear    04/20/18   0220    04/20/18   1440  04/21/18   0104  04/21/18   0458  04/21/18   1525    WBC  2.6*   --   9.1   --   10.7   --    RBC  1.83*   --   2.61*   --   2.65*   --    HEMOGLOBIN  5.7*   < >  8.3*  8.8*  8.3*  8.1*   HEMATOCRIT  17.4*   < >  23.9*  25.6*  24.1*  22.7*   MCV  94.5   --   91.6   --   90.9   --    MCH  30.6   --   31.8   --   31.3   --    MCHC  32.4*   --   34.7   --   34.4   --    RDW  59.7*   --   56.7*   --   57.1*   --    PLATELETCT  184   --   268   --   267   --    MPV  10.5   --   10.2   --   10.0   --     < > = values in this interval not displayed.            Recent Labs      04/20/18   0342  04/20/18   1440  04/21/18   0458   SODIUM  134*  134*  131*   POTASSIUM  5.3  4.9  6.5*   CHLORIDE  110  106  105   CO2  14*  14*  14*   GLUCOSE  136*  130*  177*   BUN  53*  54*  54*   CREATININE  2.60*  2.78*  3.18*   CALCIUM  8.0*  8.2*  7.8*           Recent Labs      04/20/18   0000  04/20/18   0342   APTT  >240.0*  214.5*   INR  2.34*   --               Recent Labs      04/20/18   0343   TRIGLYCERIDE  92      Physical Exam   Constitutional: He appears well-developed and well-nourished. No distress.   HENT:   Head: Normocephalic and atraumatic.   Eyes: Conjunctivae are normal. Right eye exhibits no discharge. Left eye exhibits no discharge. No scleral icterus.   Neck: No tracheal deviation present. No thyromegaly present.   Cardiovascular: Normal rate, regular rhythm and intact distal pulses.    No murmur heard.  Pulmonary/Chest: Effort normal.   Equal chest  rise and fall  Mechanical breath sounds bilaterally  No overt wheezing  Neurological:   Intubated and sedated  Slightly agitated, he moves all extremities spontaneously  On sedation vacations he'll follow commands, Cranial nervesare intact    Assessment and Plan    Assessment/Plan           * Encephalopathy- (present on admission)   Assessment & Plan     Patient presented with altered mental status  Causes unclear, MRI of the brain showed no evidence of pathology  Supportive care          Shock circulatory (CMS-HCC)- (present on admission)   Assessment & Plan     Pressors support as needed          Acute blood loss anemia- (present on admission)   Assessment & Plan     Patient had drop in his hemoglobin  Etiology unclear, he does not appear to have significant GI bleed  CT scan of the abdomen and pelvis 4/20/2018 shows no evidence of retroperitoneal hemorrhage          Acute respiratory failure (CMS-HCC)- (present on admission)   Assessment & Plan     Patient is intubated  Vent support as per pulmonary          Acute on chronic kidney failure (CMS-HCC)- (present on admission)   Assessment & Plan     Likely due to hypovolemia in the setting of blood loss, dehydration.    His potassium is elevated, will need dialysis  Nephrology consult, dialysis catheter          GERD (gastroesophageal reflux disease)- (present on admission)   Assessment & Plan     Chronic.            Dyslipidemia- (present on admission)   Assessment & Plan     On statin therapy.  Monitor.           HTN (hypertension)- (present on admission)   Assessment & Plan     Currently with relative hypotension.  Monitor.           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)- (present on admission)   Assessment & Plan     Insulin sliding scale  Hold oral hypoglycemics while the patient is inpatient

## 2018-04-22 NOTE — PROGRESS NOTES
Hospital Medicine Progress Note, Adult, Complex               Author: Fadi Najjar Date & Time created: 4/22/2018  11:49 AM     Interval History:  Pt presented with ALOC, possible CVA, developed VDRF, CAR, started HD, last night became hypotensive, now on IV pressor  Seen and examined while getting HD.    Review of Systems:  Review of Systems   Unable to perform ROS: Intubated       Physical Exam:  Physical Exam   Constitutional: He is sedated.   HENT:   Head: Normocephalic and atraumatic.   Nose: Nose normal.   ETT   Eyes: Conjunctivae are normal. Right eye exhibits no discharge. Left eye exhibits no discharge. No scleral icterus.   Neck: No thyromegaly present.   Cardiovascular: Normal rate, regular rhythm and normal heart sounds.    No murmur heard.  Pulmonary/Chest: He has no wheezes. He has no rales.   Coarse BS   Abdominal: Soft. He exhibits no distension. There is no tenderness.   Musculoskeletal: He exhibits edema.   Neurological: He is unresponsive.   Skin: Skin is warm and dry. No erythema.   Nursing note and vitals reviewed.      Labs:  Recent Labs      04/20/18   0125   04/20/18   0300  04/21/18   0454  04/22/18   0517   LLOIL01N  7.14*   --    --    --    --    [id number]  38.6*   --    --    --    --    [id number]  65.6   --    --    --    --    [id number]  85.1*   --    --    --    --    ARTHCO3  13*   --    --    --    --    ARTBE  -15*   --    --    --    --    ISTATAPH   --    < >  7.263*  7.344*  7.564*   ISTATAPCO2   --    < >  34.9  24.8*  24.9*   ISTATAPO2   --    < >  245*  111*  63*   ISTATATCO2   --    < >  17*  14*  23   [id number]   --    < >  100*  98  95   ISTATARTHCO3   --    < >  15.8*  13.5*  22.5   ISTATARTBE   --    < >  -10*  -11*  1   ISTATTEMP   --    < >  90.6 F  35.4 C  37.1 C   ISTATFIO2   --    < >  80  40  40   ISTATSPEC   --    < >  Arterial  Arterial  Arterial   ISTATAPHTC   --    < >  7.325*  7.367*  7.562*   [id number]   --    < >  225*  102*  63*    < > = values in this  interval not displayed.     Recent Labs      04/20/18   0000  04/20/18   0545  04/20/18   1440   TROPONINI  0.41*  0.40*  0.36*     Recent Labs      04/20/18   0545  04/20/18   1440  04/21/18   0458  04/22/18   0512   SODIUM   --   134*  131*  134*   POTASSIUM   --   4.9  6.5*  4.2   CHLORIDE   --   106  105  101   CO2   --   14*  14*  23   BUN   --   54*  54*  43*   CREATININE   --   2.78*  3.18*  2.54*   MAGNESIUM  2.4   --   2.0  2.0   PHOSPHORUS  5.6*   --   5.9*  4.2   CALCIUM   --   8.2*  7.8*  8.4*     Recent Labs      04/20/18   0000  04/20/18   0342  04/20/18   1440  04/21/18   0458  04/22/18   0512   ALTSGPT  21 17 19   --    --    ASTSGOT  34  30  35   --    --    ALKPHOSPHAT  184*  145*  138*   --    --    TBILIRUBIN  0.3  0.3  0.3   --    --    LIPASE  237*   --    --    --    --    GLUCOSE  60*  136*  130*  177*  300*     Recent Labs      04/20/18   0000   04/20/18   0342   04/20/18   1440   04/21/18 0458  04/21/18   1525  04/22/18   0143  04/22/18   0512   RBC  2.33*   < >   --    --   2.61*   --   2.65*   --    --   2.37*   HEMOGLOBIN  7.3*   < >   --    < >  8.3*   < >  8.3*  8.1*  7.5*  7.3*   HEMATOCRIT  22.3*   < >   --    < >  23.9*   < >  24.1*  22.7*  21.0*  21.6*   PLATELETCT  239   < >   --    --   268   --   267   --    --   207   PROTHROMBTM  25.3*   --    --    --    --    --    --    --    --    --    APTT  >240.0*   --   214.5*   --    --    --    --    --    --    --    INR  2.34*   --    --    --    --    --    --    --    --    --     < > = values in this interval not displayed.     Recent Labs      04/20/18   0000   04/20/18   0342  04/20/18   1440  04/21/18   0458  04/22/18   0512   WBC  3.9*   < >   --   9.1  10.7  8.1   NEUTSPOLYS  75.40*   < >   --   87.90*  91.00*  89.10*   LYMPHOCYTES  14.00*   < >   --   5.90*  3.60*  5.90*   MONOCYTES  9.20   < >   --   5.40  4.80  4.30   EOSINOPHILS  0.80   < >   --   0.20  0.00  0.00   BASOPHILS  0.30   < >   --   0.20  0.10  0.20    ASTSGOT  34   --   30  35   --    --    ALTSGPT  21   --   17  19   --    --    ALKPHOSPHAT  184*   --   145*  138*   --    --    TBILIRUBIN  0.3   --   0.3  0.3   --    --     < > = values in this interval not displayed.           Hemodynamics:  No data recorded.  Monitored Temp: 37.4 °C (99.3 °F)  Pulse  Av.6  Min: 37  Max: 80Heart Rate (Monitored): 74  NIBP: 103/51  CVP (mm Hg): (!) 15 MM HG  Respiratory:  Gibson Vent Mode: APVCMV, Rate (breaths/min): 18, PEEP/CPAP: 8, FiO2: 40, P Peak (PIP): 22, P MEAN: 14 Respiration: 19, Pulse Oximetry: 99 %     Work Of Breathing / Effort: Vented  RUL Breath Sounds: Clear, RML Breath Sounds: Clear, RLL Breath Sounds: Diminished, CATALINA Breath Sounds: Clear, LLL Breath Sounds: Clear  Fluids:    Intake/Output Summary (Last 24 hours) at 18 1149  Last data filed at 18 1044   Gross per 24 hour   Intake          2519.92 ml   Output             5100 ml   Net         -2580.08 ml        GI/Nutrition:  Orders Placed This Encounter   Procedures   • Diet NPO     Standing Status:   Standing     Number of Occurrences:   1     Order Specific Question:   Restrict to:     Answer:   Strict [1]     Medical Decision Making, by Problem:  Active Hospital Problems    Diagnosis   • *Encephalopathy [G93.40]   • Shock circulatory (CMS-HCC) [R57.9]   • Diabetes mellitus type 2, controlled (CMS-HCC) [E11.9]   • Acute respiratory failure (CMS-HCC) [J96.00]   • Acute blood loss anemia [D62]   • Cerebral ataxia (CMS-[facility]) [G11.9]   • Hypothermia [T68.XXXA]   • HTN (hypertension) [I10]   • Acute on chronic kidney failure (CMS-[facility]) [N17.9, N18.9]   • GERD (gastroesophageal reflux disease) [K21.9]   • Dyslipidemia [E78.5]       Quality-Core Measures   Reviewed items::  Labs reviewed, Radiology images reviewed and Medications reviewed  Holland catheter::  Critically Ill - Requiring Accurate Measurement of Urinary Output  Central line in place:  Dialysis    1 CAR: started HD  2 hypotension: can  use IVF as needed  3 Hyperkalemia: better with HD  4 VDRF  5 Anemia  6 Hypoalbuminemia   Plan  HD daily to manage uremia, hyperkalemia and fluids   Renal dose all meds  Avoid nephrotoxins  Prognosis guarded  D/W Dr Meza

## 2018-04-22 NOTE — ASSESSMENT & PLAN NOTE
- likely multifactorial  - Persists, unknown cause at this time  - Patient did have a EEG concerning for seizures, now placed on Keppra, remains altered, could possibly be due to Keppra?  - Patient also had uremia, this is improved with hemodialysis but his mentation is still off  - Could've initially been due to sepsis however this is resolved  - Discuss with family at bedside, went to see how he progresses, considering hospice  - will hold haloperidol, benzos  - wife wants to continue his care in [place], [place]

## 2018-04-22 NOTE — PROGRESS NOTES
HEMODIALYSIS NOTES:     HD today x 3 hours per Dr. Najjar. Initiated at 0736 and ended at 1036. Patient had labile BP during dialysis. Pressor restarted. Dr. Najjar seen patient and ordered to decrease UF. Patient able to finished treatment. See paper flowsheet for details.    UF Net: 1450 mL    R IJ intact and patent with good flow on lines reversed during dialysis. No s/sx of infection, no redness nor bleeding noted on the CVC site. CVC dressing clean, dry and intact. Blood returned and CVC port flushed with NS and Heparin 1000 units/mL used  to lock catheter given per designated amount. CVC port clamped and capped.     Report given to HÉCTOR Treadwell RN.

## 2018-04-22 NOTE — PROGRESS NOTES
[facility] Hospitalist Progress Note    Date of Service: 2018    Chief Complaint  77 y.o. male admitted 2018 with confusion.    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  Patient was being worked up for facial droop at medical facility near his home in [facility]. MRI of the brain done on 2018 was negative for acute pathology.  Shortly after the MRI, he developed confusion was taken to emergency department. Workup there included a troponin which was elevated, the patient was transported by care flight to [facility] for higher level care. Renal function and dialysis started on 18 for hyperkalemia.  Episode concerning for seizure on 18    Interval Problem Update  Patient is intubated and sedated, he is on propofol, unable to give interval history  Overnight patient had episode of convulsions, concerning for a seizure  On vasopressin and levophed, titrating levophed to maintain MAP of 60  Started on dialysis yesterday, potassium improved    Consultants/Specialty  Critical care  Cardiology  Nephrology    Disposition  To be determined, the patient is critically ill in the ICU    Patient discussed on rounds with intensivist, RN, respiratory therapy, and pharmacy.      Review of Systems   Unable to perform ROS: Intubated      Physical Exam  Laboratory/Imaging   Hemodynamics  No data recorded.   Monitored Temp: 37.7 °C (99.9 °F)  Pulse  Av.4  Min: 37  Max: 80 Heart Rate (Monitored): 64  NIBP: 104/52 CVP (mm Hg): (!) 15 MM HG    Respiratory  Gibson Vent Mode: APVCMV, Rate (breaths/min): 18, PEEP/CPAP: 8, PEEP/CPAP: 8, FiO2: 40, P Peak (PIP): 13, P MEAN: 11   Respiration: 19, Pulse Oximetry: 100 %     Work Of Breathing / Effort: Vented  RUL Breath Sounds: Clear, RML Breath Sounds: Clear, RLL Breath Sounds: Diminished, CATALINA Breath Sounds: Clear, LLL Breath Sounds: Clear    Fluids    Intake/Output Summary (Last  24 hours) at 04/22/18 0945  Last data filed at 04/22/18 0800   Gross per 24 hour   Intake          1999.92 ml   Output             3050 ml   Net         -1050.08 ml       Nutrition  Orders Placed This Encounter   Procedures   • Diet NPO     Standing Status:   Standing     Number of Occurrences:   1     Order Specific Question:   Restrict to:     Answer:   Strict [1]     Physical Exam   Constitutional: He appears well-developed and well-nourished. No distress.   HENT:   Head: Normocephalic and atraumatic.   Right Ear: External ear normal.   Left Ear: External ear normal.   Eyes: Conjunctivae and EOM are normal. Right eye exhibits no discharge. Left eye exhibits no discharge. No scleral icterus.   Neck: No tracheal deviation present. No thyromegaly present.   Cardiovascular: Normal rate, regular rhythm and intact distal pulses.    No murmur heard.  Pulmonary/Chest: Effort normal.   Equal chest rise and fall  Mechanical breath sounds bilaterally  No overt wheezing     Abdominal: Soft. Bowel sounds are normal. He exhibits no distension and no mass. There is no rebound.   Musculoskeletal: Normal range of motion. He exhibits no edema.   Neurological:   Intubated and sedated  Slightly agitated, he moves all extremities spontaneously  On sedation vacations he'll follow commands   Skin: Skin is warm and dry. No rash noted. He is not diaphoretic. No erythema.   Vitals reviewed.      Recent Labs      04/20/18   1440   04/21/18   0458  04/21/18   1525  04/22/18   0143  04/22/18   0512   WBC  9.1   --   10.7   --    --   8.1   RBC  2.61*   --   2.65*   --    --   2.37*   HEMOGLOBIN  8.3*   < >  8.3*  8.1*  7.5*  7.3*   HEMATOCRIT  23.9*   < >  24.1*  22.7*  21.0*  21.6*   MCV  91.6   --   90.9   --    --   91.1   MCH  31.8   --   31.3   --    --   30.8   MCHC  34.7   --   34.4   --    --   33.8   RDW  56.7*   --   57.1*   --    --   55.5*   PLATELETCT  268   --   267   --    --   207   MPV  10.2   --   10.0   --    --   10.6     < > = values in this interval not displayed.     Recent Labs      04/20/18   1440  04/21/18   0458  04/22/18   0512   SODIUM  134*  131*  134*   POTASSIUM  4.9  6.5*  4.2   CHLORIDE  106  105  101   CO2  14*  14*  23   GLUCOSE  130*  177*  300*   BUN  54*  54*  43*   CREATININE  2.78*  3.18*  2.54*   CALCIUM  8.2*  7.8*  8.4*     Recent Labs      04/20/18   0000  04/20/18   0342   APTT  >240.0*  214.5*   INR  2.34*   --          Recent Labs      04/20/18   0343  04/22/18   0512   TRIGLYCERIDE  92  446*          Assessment/Plan     * Encephalopathy- (present on admission)   Assessment & Plan    Patient presented with altered mental status  Causes unclear, MRI of the brain showed no evidence of pathology  Concern for seizure overnight, EEG today        Shock circulatory (CMS-HCC)- (present on admission)   Assessment & Plan    Pressors support as needed  Stop vasopressin today  Use levophed as needed to support blood pressure, may need titration for dialysis        Diabetes mellitus type 2, controlled (CMS-HCC)- (present on admission)   Assessment & Plan    Uncontrolled, blood sugars to 300's  Add long acting insulin, decrease steroid dose  Hold oral hypoglycemics while the patient is inpatient        Acute blood loss anemia- (present on admission)   Assessment & Plan    Patient had drop in his hemoglobin  Etiology unclear, he does not appear to have significant GI bleed  CT scan of the abdomen and pelvis 4/20/2018 shows no evidence of retroperitoneal hemorrhage  Monitor hemoglobin, transfuse as needed        Acute respiratory failure (CMS-HCC)- (present on admission)   Assessment & Plan    Patient is intubated  Vent support as per pulmonary        Acute on chronic kidney failure (CMS-HCC)- (present on admission)   Assessment & Plan    Likely due to hypovolemia in the setting of blood loss, dehydration.    His potassium is elevated, will need dialysis  Nephrology consult, dialysis catheter        GERD (gastroesophageal  reflux disease)- (present on admission)   Assessment & Plan    Chronic.          Dyslipidemia- (present on admission)   Assessment & Plan    On statin therapy.  Monitor.         Cerebral ataxia (CMS-HCC)- (present on admission)   Assessment & Plan    Patient has significant debility from chronic cerebral ataxia  He is wheelchair bound at baseline        Hypothermia- (present on admission)   Assessment & Plan    Unclear cause, it is not due to cold exposure  TSH is WNL          HTN (hypertension)- (present on admission)   Assessment & Plan    He is hypotensive, hold outpatient meds          Quality-Core Measures

## 2018-04-22 NOTE — CARE PLAN
Problem: Ventilation Defect:  Goal: Ability to achieve and maintain unassisted ventilation or tolerate decreased levels of ventilator support  Outcome: PROGRESSING AS EXPECTED  Adult Ventilation Update    Total Vent Days: 2    Patient Lines/Drains/Airways Status    Active Airway     Name: Placement date: Placement time: Site: Days:    Airway Group ET Tube Oral 8.0 04/20/18   0206   Oral   1              Plateau Pressure (Q Shift): 15 (04/20/18 1903)  Static Compliance (ml / cm H2O): 46 (04/21/18 1434)    Patient failed trials because of Barriers to Wean: Other (Comments) (did not tolerate sedation vacation, agitated) (04/21/18 0825)  Barriers to SBT    Length of Weaning Trial        Cough: Non Productive (04/21/18 1434)  Sputum Amount: Unable to Evaluate (04/21/18 1434)  Sputum Color: Unable to Evaluate (04/21/18 1434)  Sputum Consistency: Unable to Evaluate (04/21/18 1434)    Mobility Group  Activity Performed: Unable to mobilize (04/21/18 0825)  Reason Not Mobilized: Other (comment) (RASS -3) (04/20/18 2000)    Events/Summary/Plan: vent check and in line med (04/21/18 1434)

## 2018-04-22 NOTE — CARE PLAN
Problem: Ventilation Defect:  Goal: Ability to achieve and maintain unassisted ventilation or tolerate decreased levels of ventilator support  Outcome: PROGRESSING AS EXPECTED  Adult Ventilation Update    Total Vent Days: 3    Patient Lines/Drains/Airways Status    Active Airway     Name: Placement date: Placement time: Site: Days:    Airway Group ET Tube Oral 8.0 04/20/18   0206   Oral   2              Plateau Pressure (Q Shift): 17 (04/21/18 1848)  Static Compliance (ml / cm H2O): 57.5 (04/22/18 1543)    Patient failed trials because of Barriers to Wean: Other (Comments) (04/22/18 1543)  Barriers to SBT    Length of Weaning Trial        Cough: Productive (04/22/18 1543)  Sputum Amount: Small (04/22/18 1600)  Sputum Color: Tan (blood tinged) (04/22/18 1600)  Sputum Consistency: Thick (04/22/18 1600)    Mobility Group  Activity Performed: Unable to mobilize (04/22/18 0634)  Reason Not Mobilized: Other (comment) (RASS -3) (04/20/18 2000)    Events/Summary/Plan: vent check and in line med (04/22/18 1059)

## 2018-04-22 NOTE — PROGRESS NOTES
[facility] Hospitalist Progress Note    Date of Service: 2018    Chief Complaint  77 y.o. male admitted 2018 with confusion.    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  Patient was being worked up for facial droop at medical facility near his home in [facility]. MRI of the brain done on 2018 was negative for acute pathology.  Shortly after the MRI, he developed confusion was taken to emergency department. Workup there included a troponin which was elevated, the patient was transported by care flight to [facility] for higher level care. Renal function and dialysis started on 18 for hyperkalemia    Interval Problem Update  Patient is intubated and sedated, he is on propofol  Anytime the sedation is turned on its very agitated  Remains on norepinephrine and vasopressin, hydrocortisone has been added  He is intubated and sedated, thus unable to provide interval history    Consultants/Specialty  Critical care  Cardiology  Nephrology    Disposition  To be determined, the patient is critically ill in the ICU    Patient discussed on rounds with intensivist, RN, respiratory therapy, and pharmacy.      Review of Systems   Unable to perform ROS: Intubated      Physical Exam  Laboratory/Imaging   Hemodynamics  No data recorded.   Monitored Temp: 36.5 °C (97.7 °F)  Pulse  Av.1  Min: 37  Max: 80 Heart Rate (Monitored): (!) 55  NIBP: 131/59 CVP (mm Hg): (!) 15 MM HG    Respiratory  Gibson Vent Mode: APVCMV, Rate (breaths/min): 26, PEEP/CPAP: 8, PEEP/CPAP: 8, FiO2: 40, P Peak (PIP): 22, P MEAN: 12   Respiration: (!) 24, Pulse Oximetry: 97 %     Work Of Breathing / Effort: Vented  RUL Breath Sounds: Clear, RML Breath Sounds: Clear, RLL Breath Sounds: Diminished, CATALINA Breath Sounds: Clear, LLL Breath Sounds: Clear    Fluids    Intake/Output Summary (Last 24 hours) at 18 1810  Last data filed at 18 1400    Gross per 24 hour   Intake            704.5 ml   Output              485 ml   Net            219.5 ml       Nutrition  Orders Placed This Encounter   Procedures   • Diet NPO     Standing Status:   Standing     Number of Occurrences:   1     Order Specific Question:   Restrict to:     Answer:   Strict [1]     Physical Exam   Constitutional: He appears well-developed and well-nourished. No distress.   HENT:   Head: Normocephalic and atraumatic.   Eyes: Conjunctivae are normal. Right eye exhibits no discharge. Left eye exhibits no discharge. No scleral icterus.   Neck: No tracheal deviation present. No thyromegaly present.   Cardiovascular: Normal rate, regular rhythm and intact distal pulses.    No murmur heard.  Pulmonary/Chest: Effort normal.   Equal chest rise and fall  Mechanical breath sounds bilaterally  No overt wheezing     Abdominal: Soft. Bowel sounds are normal. He exhibits no distension. There is no tenderness. There is no rebound.   Musculoskeletal: Normal range of motion. He exhibits no edema.   Neurological:   Intubated and sedated  Slightly agitated, he moves all extremities spontaneously  On sedation vacations he'll follow commands   Skin: Skin is warm and dry. He is not diaphoretic. No erythema.   Vitals reviewed.      Recent Labs      04/20/18   0220   04/20/18   1440  04/21/18   0104  04/21/18   0458  04/21/18   1525   WBC  2.6*   --   9.1   --   10.7   --    RBC  1.83*   --   2.61*   --   2.65*   --    HEMOGLOBIN  5.7*   < >  8.3*  8.8*  8.3*  8.1*   HEMATOCRIT  17.4*   < >  23.9*  25.6*  24.1*  22.7*   MCV  94.5   --   91.6   --   90.9   --    MCH  30.6   --   31.8   --   31.3   --    MCHC  32.4*   --   34.7   --   34.4   --    RDW  59.7*   --   56.7*   --   57.1*   --    PLATELETCT  184   --   268   --   267   --    MPV  10.5   --   10.2   --   10.0   --     < > = values in this interval not displayed.     Recent Labs      04/20/18   0342  04/20/18   1440  04/21/18   0458   SODIUM  134*  134*   131*   POTASSIUM  5.3  4.9  6.5*   CHLORIDE  110  106  105   CO2  14*  14*  14*   GLUCOSE  136*  130*  177*   BUN  53*  54*  54*   CREATININE  2.60*  2.78*  3.18*   CALCIUM  8.0*  8.2*  7.8*     Recent Labs      04/20/18   0000  04/20/18   0342   APTT  >240.0*  214.5*   INR  2.34*   --          Recent Labs      04/20/18   0343   TRIGLYCERIDE  92          Assessment/Plan     * Encephalopathy- (present on admission)   Assessment & Plan    Patient presented with altered mental status  Causes unclear, MRI of the brain showed no evidence of pathology  Supportive care        Shock circulatory (CMS-HCC)- (present on admission)   Assessment & Plan    Pressors support as needed        Acute blood loss anemia- (present on admission)   Assessment & Plan    Patient had drop in his hemoglobin  Etiology unclear, he does not appear to have significant GI bleed  CT scan of the abdomen and pelvis 4/20/2018 shows no evidence of retroperitoneal hemorrhage        Acute respiratory failure (CMS-HCC)- (present on admission)   Assessment & Plan    Patient is intubated  Vent support as per pulmonary        Acute on chronic kidney failure (CMS-HCC)- (present on admission)   Assessment & Plan    Likely due to hypovolemia in the setting of blood loss, dehydration.    His potassium is elevated, will need dialysis  Nephrology consult, dialysis catheter        GERD (gastroesophageal reflux disease)- (present on admission)   Assessment & Plan    Chronic.          Dyslipidemia- (present on admission)   Assessment & Plan    On statin therapy.  Monitor.         HTN (hypertension)- (present on admission)   Assessment & Plan    Currently with relative hypotension.  Monitor.         Cerebral ataxia (CMS-HCC)- (present on admission)   Assessment & Plan    Patient has significant debility from chronic cerebral ataxia  He is wheelchair bound at baseline        Hypothermia- (present on admission)   Assessment & Plan    Unclear cause, it is not due to cold  exposure  TSH is WNL, cortisol pending          Diabetes mellitus type 2, controlled (CMS-[facility])- (present on admission)   Assessment & Plan    Insulin sliding scale  Hold oral hypoglycemics while the patient is inpatient          Quality-Core Measures

## 2018-04-22 NOTE — PROGRESS NOTES
"Pulmonary Critical Care Progress Note        Date of Admission:  4/21/2018    Chief Complaint: ALOC    History of Present Illness: 77-year-old  lives in [place], [place].    Apparently yesterday, he began to have slurred   speech and some right-sided facial droop.  The wife took him to the hospital.    He had an MRI which apparently was normal.  This morning the wife noted him   to be somewhat confused and acting \"spacey.\"  He presented back to the   hospital in [place].  He was reportedly bradycardic into the 20s.  He was given   atropine with improvement in his heart rate and mentation.  He was transferred   to [facility] for subspecialty care.  After arriving at [facility], he has developed   hypoxemia.  He was somewhat combative.  He was requiring increasing amounts of   oxygen and he was intubated.  He has also been found to be hypoglycemic.     Dextrose was administered.  He also had elevated potassium and he has received   calcium.  A rectal temperature reveals that his temperature is 90 degrees and   he is now being externally warmed with a Carolynn Hugger.    ROS:  Respiratory: unable to perform due to the patient's inability to effectively communicate, Cardiac: unable to perform due to the patient's inability to effectively communicate, GI: unable to perform due to the patient's inability to effectively communicate.  All other systems negative.    Interval Events:  24 hour interval history reviewed    - vent day 3, 8, 40%, decrease RR   - PPI for ? GIB   - Hgb down to 7.3   - Plts 207   - Unasyn/azithro   - HD day #2   - ? sz activity   - solucortef, decrease to 50 q 8   - add lantus, SSI   - off epi day; on vaospressin .03, norepi 1   - TGs up  Yesterday   - arouses, not following   - vasopressin, norepi   - Tm 100F   - bobo TF at goal   - oliguric   - K up,    - vent day 2, 8, 40%   - change to PO PPI   - holding DVT P   - Unasyn/azithro, BAL neg    - echo P   - renal fxn worse; d/w renal   - s/p LR    - change HH to q 12, no " evidence of active bleed  :  PFSH:  No change.    Respiratory:  Gibson Vent Mode: APVCMV, Rate (breaths/min): 18, Vt Target (mL): 450, PEEP/CPAP: 8, FiO2: 40, Static Compliance (ml / cm H2O): 67, Control VTE (exp VT): 344  Pulse Oximetry: 100 %  Chest Tube Drains:          Exam: rhonchi bibasilar and diminished breath sounds moderate  ImagingAvailable data reviewed   Recent Labs      04/20/18   0125   04/20/18   0300  04/21/18   0454  04/22/18   0517   [id number]  7.14*   --    --    --    --    [id number]  38.6*   --    --    --    --    [id number]  65.6   --    --    --    --    [id number]  85.1*   --    --    --    --    ARTHCO3  13*   --    --    --    --    ARTBE  -15*   --    --    --    --    ISTATAPH   --    < >  7.263*  7.344*  7.564*   ISTATAPCO2   --    < >  34.9  24.8*  24.9*   ISTATAPO2   --    < >  245*  111*  63*   ISTATATCO2   --    < >  17*  14*  23   [id number]   --    < >  100*  98  95   ISTATARTHCO3   --    < >  15.8*  13.5*  22.5   ISTATARTBE   --    < >  -10*  -11*  1   ISTATTEMP   --    < >  90.6 F  35.4 C  37.1 C   ISTATFIO2   --    < >  80  40  40   ISTATSPEC   --    < >  Arterial  Arterial  Arterial   ISTATAPHTC   --    < >  7.325*  7.367*  7.562*   [id number]   --    < >  225*  102*  63*    < > = values in this interval not displayed.       HemoDynamics:  Pulse: 64, Heart Rate (Monitored): 64  NIBP: 104/52  CVP (mm Hg): (!) 15 MM HG    Exam: regular rate and rhythm  Imaging: Available data reviewed  Recent Labs      04/20/18   0000  04/20/18   0545  04/20/18   1440   TROPONINI  0.41*  0.40*  0.36*       Neuro:  GCS Total Valdemar Coma Score: 7       Exam: Heavily sedated, int agitated, not following  Imaging: Available data reviewed    Fluids:  Intake/Output       04/20/18 0700 - 04/21/18 0659 04/21/18 0700 - 04/22/18 0659 04/22/18 0700 - 04/23/18 0659      4549-2048 1133-2086 Total 8506-1350 7345-8350 Total 0351-8663 0125-0261 Total       Intake    I.V.  281.2  632.6 913.8  368  193.2 561.2   130.6  -- 130.6    Vasopressin Volume 0 73.1 73.1 108 72 180 54 -- 54    Propofol Volume 70.9 166.6 237.5 185.6 117.6 303.2 76.6 -- 76.6    Norepinephrine Volume 37.5 353.5 391 74.4 3.6 78 -- -- --    Epinephrine Volume 172.8 39.5 212.2 -- -- -- -- -- --    Blood  700  -- 700  --  -- --  --  -- --    Volume (RELEASE FRESH FROZEN PLASMA) 700 -- 700 -- -- -- -- -- --    Dialysis  --  -- --  500  -- 500  --  -- --    Dialysis Volume (Dialysis Intake) -- -- -- 500 -- 500 -- -- --    Enteral  --  -- --  480  400 880  80  -- 80    Enteral Volume -- -- -- 480 400 880 80 -- 80    Total Intake 981.2 632.6 1613.8 1348 51941.2 210.6 -- 210.6       Output    Urine  215  180 395  365  235 600  40  -- 40    Indwelling Cathether 215 180 395 365 235 600 40 -- 40    Drains  --  0 0  0  0 0  --  -- --    Residual Amount (ml) (Discarded) -- 0 0 0 0 0 -- -- --    Dialysis  --  -- --  2500  -- 2500  --  -- --    Dialysis Output (Dialysis Output) -- -- -- 2500 -- 2500 -- -- --    Stool  --  -- --  --  -- --  --  -- --    Number of Times Stooled -- -- -- 3 x 3 x 6 x -- -- --    Total Output 215  235 3100 40 -- 40       Net I/O     766.2 452.6 1218.8 -1517 358.2 -1158.8 170.6 -- 170.6           Recent Labs      04/20/18   0545  04/20/18   1440  04/21/18   0458  04/22/18   0512   SODIUM   --   134*  131*  134*   POTASSIUM   --   4.9  6.5*  4.2   CHLORIDE   --   106  105  101   CO2   --   14*  14*  23   BUN   --   54*  54*  43*   CREATININE   --   2.78*  3.18*  2.54*   MAGNESIUM  2.4   --   2.0  2.0   PHOSPHORUS  5.6*   --   5.9*  4.2   CALCIUM   --   8.2*  7.8*  8.4*       GI/Nutrition:  Exam: abdomen is soft and non-tender  Imaging: Available data reviewed  NPO  Liver Function  Recent Labs      04/20/18   0000  04/20/18   0342  04/20/18   1440  04/21/18   0458  04/22/18   0512   ALTSGPT  21  17  19   --    --    ASTSGOT  34  30  35   --    --    ALKPHOSPHAT  184*  145*  138*   --    --    TBILIRUBIN  0.3  0.3  0.3   --     --    LIPASE  237*   --    --    --    --    GLUCOSE  60*  136*  130*  177*  300*       Heme:  Recent Labs      18   0000   18   0342   18   1440   18   0458  18   1525  18   0143  18   0512   RBC  2.33*   < >   --    --   2.61*   --   2.65*   --    --   2.37*   HEMOGLOBIN  7.3*   < >   --    < >  8.3*   < >  8.3*  8.1*  7.5*  7.3*   HEMATOCRIT  22.3*   < >   --    < >  23.9*   < >  24.1*  22.7*  21.0*  21.6*   PLATELETCT  239   < >   --    --   268   --   267   --    --   207   PROTHROMBTM  25.3*   --    --    --    --    --    --    --    --    --    APTT  >240.0*   --   214.5*   --    --    --    --    --    --    --    INR  2.34*   --    --    --    --    --    --    --    --    --     < > = values in this interval not displayed.       Infectious Disease:  Monitored Temp  Av.7 °C (98.1 °F)  Min: 35.1 °C (95.2 °F)  Max: 37.8 °C (100 °F)  Micro: reviewed  Recent Labs      18   0000   18   0342  18   1440  18   0458  18   0512   WBC  3.9*   < >   --   9.1  10.7  8.1   NEUTSPOLYS  75.40*   < >   --   87.90*  91.00*  89.10*   LYMPHOCYTES  14.00*   < >   --   5.90*  3.60*  5.90*   MONOCYTES  9.20   < >   --   5.40  4.80  4.30   EOSINOPHILS  0.80   < >   --   0.20  0.00  0.00   BASOPHILS  0.30   < >   --   0.20  0.10  0.20   ASTSGOT  34   --   30  35   --    --    ALTSGPT  21   --   17  19   --    --    ALKPHOSPHAT  184*   --   145*  138*   --    --    TBILIRUBIN  0.3   --   0.3  0.3   --    --     < > = values in this interval not displayed.     Current Facility-Administered Medications   Medication Dose Frequency Provider Last Rate Last Dose   • LORazepam (ATIVAN) injection 1-2 mg  1-2 mg Q HOUR PRN Manuel Lombardo M.D.       • levETIRAcetam (KEPPRA) 1,000 mg in  mL IVPB  1,000 mg Q12HRS Manuel Lombardo M.D.   Stopped at 18 0053   • heparin injection 3,000 Units  3,000 Units DIALYSIS PRN Fadi Najjar, M.D.       •  [START ON 4/23/2018] ampicillin/sulbactam (UNASYN) 3 g in  mL IVPB  3 g Q24HRS Stephon Trejo M.D.       • lidocaine (XYLOCAINE) 1 % solution  1 mL PRN Fadi Najjar, M.D.       • azithromycin (ZITHROMAX) tablet 250 mg  250 mg DAILY Stephon Trejo M.D.   250 mg at 04/22/18 0755   • omeprazole 2 mg/mL in sodium bicarbonate (PRILOSEC) oral susp 40 mg  40 mg DAILY Stephon Trejo M.D.   40 mg at 04/22/18 0755   • rocuronium (ZEMURON) injection 50 mg  50 mg Once Bryan Meza M.D.   Stopped at 04/21/18 1548   • EPINEPHrine 1 mg/mL(1:1000) 4 mg in  mL Infusion  0-10 mcg/min Continuous Ji Siegel M.D. 26.3 mL/hr at 04/20/18 1215 7 mcg/min at 04/20/18 1215   • Respiratory Care per Protocol   Continuous RT Manuel Lombardo M.D.       • chlorhexidine (PERIDEX) 0.12 % solution 15 mL  15 mL BID Manuel Lombardo M.D.   15 mL at 04/22/18 0755   • fentaNYL (SUBLIMAZE) injection 25 mcg  25 mcg Q HOUR PRN Manuel Lombardo M.D.        Or   • fentaNYL (SUBLIMAZE) injection 50 mcg  50 mcg Q HOUR PRN Manuel Lombardo M.D.        Or   • fentaNYL (SUBLIMAZE) injection 100 mcg  100 mcg Q HOUR PRN Manuel Lombardo M.D.   100 mcg at 04/20/18 1827   • ipratropium-albuterol (DUONEB) nebulizer solution 3 mL  3 mL Q2HRS PRN (RT) Manuel Lombardo M.D.       • ipratropium-albuterol (DUONEB) nebulizer solution 3 mL  3 mL Q4HRS (RT) Manuel Lombardo M.D.   3 mL at 04/22/18 0633   • insulin regular (HUMULIN R) injection 2-9 Units  2-9 Units Q6HRS Manuel Lombardo M.D.   6 Units at 04/22/18 0556    And   • glucose 4 g chewable tablet 16 g  16 g Q15 MIN PRN Manuel Lombardo M.D.        And   • dextrose 50% (D50W) injection 25 mL  25 mL Q15 MIN PRN Manuel Lombardo M.D.       • ondansetron (ZOFRAN) syringe/vial injection 4 mg  4 mg Q4HRS PRN Hernan Dallas M.D.       • propofol (DIPRIVAN) injection  0-80 mcg/kg/min Continuous Manuel Lombardo M.D. 2.9 mL/hr at 04/22/18  0830 5 mcg/kg/min at 04/22/18 0830   • Pharmacy Consult: Enteral tube feeding - review meds/change route/product selection  1 Each PRN Bryan Meza M.D.       • senna-docusate (PERICOLACE or SENOKOT S) 8.6-50 MG per tablet 2 Tab  2 Tab BID Stephon Trejo M.D.   Stopped at 04/21/18 2100    And   • polyethylene glycol/lytes (MIRALAX) PACKET 1 Packet  1 Packet QDAY PRN Stephon Trejo M.D.        And   • magnesium hydroxide (MILK OF MAGNESIA) suspension 30 mL  30 mL QDAY PRN Stephon Trejo M.D.        And   • bisacodyl (DULCOLAX) suppository 10 mg  10 mg QDAY PRN Stephon Trejo M.D.       • acetaminophen (TYLENOL) tablet 650 mg  650 mg Q6HRS PRN Stephon Trejo M.D.   650 mg at 04/20/18 2123   • ondansetron (ZOFRAN ODT) dispertab 4 mg  4 mg Q4HRS PRN Stephon Trejo M.D.       • vasopressin (VASOSTRICT) 20 Units in  mL Infusion  0.03 Units/min Continuous Stephon Trejo M.D. 9 mL/hr at 04/22/18 0515 0.03 Units/min at 04/22/18 0515   • norepinephrine (LEVOPHED) 8 mg in  mL Infusion  0-30 mcg/min Continuous Stephon Trejo M.D. 1.9 mL/hr at 04/22/18 0800 1 mcg/min at 04/22/18 0800   • hydrocortisone sodium succinate PF (SOLU-CORTEF) 100 MG injection 100 mg  100 mg Q8HRS Stephon Trejo M.D.   100 mg at 04/22/18 0541     Last reviewed on 4/20/2018 12:11 PM by Payam Leon    Quality  Measures:  Labs reviewed, Medications reviewed and Radiology images reviewed                      Problems:  Acute hypoxemic respiratory failure   - intubated 4/20   - full vent support; vent settings adjusted today   - not appropriate for liberation   - A- F bundle  Acute unspecified encephalopathy   - CT scan of the head in [place] is negative    - MRI negative for acute pathology   - ? Toxic/metabolic; w/u in progress, r/o sz  Hypotension, unspecified   - IVF, titrated vasopressors   - w/u as above  Query pneumonia  Hypothermia.  Acute blood loss anemia   - ? Source, doubt hemolysis, no evidence of GI loss   - CT  abdomen and pelvis -  no retroperitoneal hemorrhage or ischemic bowel  Acute kidney injury, on CKD?   - started daily RRT   - HD catheter   - nephro following  Hyperkalemia   - s/p Calcium, bicarb    -  RRT  Hyponatremia  Elevated lipase  Positive troponin   - suspect type 2/demand   - no evidence of ACS, trend  Hypoglycemia.  Diabetes mellitus type 2   - glycemic control  History of systemic arterial hypertension.  Gastroesophageal reflux disease.  Dyslipidemia.  Chronic back pain.    He remains very critically ill at high risk for further deterioration. I have adjusted vent settings, I have titrated vasopressors.    Discussed patient condition and risk of morbidity and/or mortality with Family, RN, RT and QA team.    The patient remains critically ill. Additional critical care time = 34 minutes in directly providing and coordinating critical care and extensive data review.  No time overlap and excludes procedures.

## 2018-04-23 NOTE — DISCHARGE PLANNING
Courtesy Note- Outpatient Dialysis    On standby for outpatient dialysis placement needs should they arise. Please call if needed.    Reena Kathleen- Dialysis Coordinator  [facility] ph# [phone]

## 2018-04-23 NOTE — PROGRESS NOTES
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 3 rd treatment today. CXR reviewed, still looks a bit wet. Has been weaned on pressors.    Review of Systems:  Review of Systems   Unable to perform ROS: Intubated       Physical Exam:  Physical Exam   Constitutional: He appears well-developed. He appears ill.   HENT:   Head: Normocephalic and atraumatic.   Pulmonary/Chest: Effort normal and breath sounds normal.   Abdominal: Soft. Bowel sounds are normal.   Skin: Skin is warm and dry.       Labs:  Recent Labs      04/21/18 0454  04/22/18   0517 04/23/18   0512   ISTATAPH  7.344*  7.564*  7.531*   ISTATAPCO2  24.8*  24.9*  32.1   ISTATAPO2  111*  63*  125*   ISTATATCO2  14*  23  28   [id number]  98  95  99   ISTATARTHCO3  13.5*  22.5  26.9*   ISTATARTBE  -11*  1  4*   ISTATTEMP  35.4 C  37.1 C  36.9 C   ISTATFIO2  40  40  40   ISTATSPEC  Arterial  Arterial  Arterial   ISTATAPHTC  7.367*  7.562*  7.533*   [id number]  102*  63*  125*     Recent Labs      04/20/18   1440   TROPONINI  0.36*     Recent Labs      04/21/18   0458  04/22/18   0512  04/23/18 0457   SODIUM  131*  134*  137   POTASSIUM  6.5*  4.2  4.0   CHLORIDE  105  101  101   CO2  14*  23  25   BUN  54*  43*  41*   CREATININE  3.18*  2.54*  2.38*   MAGNESIUM  2.0  2.0  2.1   PHOSPHORUS  5.9*  4.2  3.7   CALCIUM  7.8*  8.4*  8.2*     Recent Labs      04/20/18   1440  04/21/18   0458  04/22/18   0512  04/23/18   0457   ALTSGPT  19   --    --   53*   ASTSGOT  35   --    --   102*   ALKPHOSPHAT  138*   --    --   429*   TBILIRUBIN  0.3   --    --   0.7   PREALBUMIN   --    --    --   16.0*   GLUCOSE  130*  177*  300*  155*     Recent Labs      04/21/18   0458   04/22/18   0512   18   1815  18   0457   RBC  2.65*   --   2.37*   --   2.53*   HEMOGLOBIN  8.3*   < >  7.3*  7.4*  7.8*   HEMATOCRIT  24.1*   < >  21.6*  22.0*  23.6*   PLATELETCT  267   --   207   --   234    < > = values in this interval not displayed.     Recent Labs      18   1440  18   0458  18   0512  18   0457   WBC  9.1  10.7  8.1  11.5*   NEUTSPOLYS  87.90*  91.00*  89.10*  82.50*   LYMPHOCYTES  5.90*  3.60*  5.90*  10.30*   MONOCYTES  5.40  4.80  4.30  6.00   EOSINOPHILS  0.20  0.00  0.00  0.10   BASOPHILS  0.20  0.10  0.20  0.20   ASTSGOT  35   --    --   102*   ALTSGPT  19   --    --   53*   ALKPHOSPHAT  138*   --    --   429*   TBILIRUBIN  0.3   --    --   0.7           Hemodynamics:  No data recorded.  Monitored Temp: 37.2 °C (99 °F)  Pulse  Av  Min: 37  Max: 80Heart Rate (Monitored): 81  NIBP: 121/74  CVP (mm Hg): (!) 14 MM HG  Respiratory:  Gibson Vent Mode: APVCMV, Rate (breaths/min): 12, PEEP/CPAP: 8, FiO2: 40, P Peak (PIP): 22, P MEAN: 15 Respiration: (!) 22, Pulse Oximetry: 100 %     Work Of Breathing / Effort: Vented  RUL Breath Sounds: Clear, RML Breath Sounds: Clear, RLL Breath Sounds: Diminished, CATALINA Breath Sounds: Clear, LLL Breath Sounds: Clear  Fluids:    Intake/Output Summary (Last 24 hours) at 18 1128  Last data filed at 18 1000   Gross per 24 hour   Intake          1407.64 ml   Output              440 ml   Net           967.64 ml        GI/Nutrition:  Orders Placed This Encounter   Procedures   • Diet NPO     Standing Status:   Standing     Number of Occurrences:   1     Order Specific Question:   Restrict to:     Answer:   Strict [1]     Medical Decision Making, by Problem:  Active Hospital Problems    Diagnosis   • *Encephalopathy [G93.40]   • Shock circulatory (CMS-[facility]) [R57.9]   • Acute respiratory failure (CMS-[facility]) [J96.00]   • Acute blood loss anemia [D62]   • Diabetes mellitus type 2, controlled (CMS-[facility]) [E11.9]   • Cerebral ataxia (CMS-[facility])  [G11.9]   • Hypothermia [T68.XXXA]   • HTN (hypertension) [I10]   • Acute on chronic kidney failure (CMS-[facility]) [N17.9, N18.9]   • GERD (gastroesophageal reflux disease) [K21.9]   • Dyslipidemia [E78.5]     1. CAR - HD today, monitor  2. Anemia - Add epogen  3. Respiratory failure / volume overload    -HD, UF as tolerated    Quality-Core Measures

## 2018-04-23 NOTE — DISCHARGE PLANNING
Medical SW    Sw attended AM IDT Rounds.    RN reports, pt on vent, following commands when decrease  sedation, mcarthur in place, 3rd round dialysis today,     Plan: Sw to assist w/ d/c planning as needed.

## 2018-04-23 NOTE — DISCHARGE PLANNING
Medical SW    Referral: Sw met w/ wife. Sw provided completed lodging discount letter and current list of lodging participants.      Plan: Sw to assist w/ d/c planning as needed.

## 2018-04-23 NOTE — PROGRESS NOTES
HD treatment # 3 today using RI temp CVC, positional-run better reversed.Treatment tolerated well.Net UF removed 1500 ml.Report given to primary Rn.

## 2018-04-23 NOTE — EEG PROGRESS NOTE
EEG 04/23/18 2:27 PM    ROUTINE ELECTROENCEPHALOGRAM REPORT      NAME:Jose Connor    REFERRING Dr:    INDICATION:       TECHNIQUE: 30 channel routine electroencephalogram (EEG) was performed in accordance with the international 10-20 system. The study was reviewed in bipolar and referential montages.     DESCRIPTION OF THE RECORD:      The EEG background consists of mixed theta and delta. There are periodic epileptiform sharp over frontal ( R>L) these does not meet the criteria of electrographic seizures but highly suggestive of focal seizures from the right frontal      ACTIVATION PROCEDURES:          ICTAL AND/OR INTERICTAL FINDINGS:         EKG: sampling of the EKG recording demonstrated arrhythm.        INTERPRETATION:        ________________________________________________________________________    This scalp EEG is consistent with      1. Diffuse nonspecific cortical dysfunction - moderate to severe     2. Focal cortical dysfunction and irritability over frontal -- R>L , focal seizures are likely    There are no electrographic seizures in this short record though    Advise anti-seizure medication if the patient remains confused    EEG 04/23/18 2:32 PM    ________________________________________________________________________

## 2018-04-23 NOTE — CARE PLAN
Problem: Nutritional:  Goal: Nutrition support tolerated and meeting greater than 85% of estimated needs  Outcome: MET Date Met: 04/23/18

## 2018-04-23 NOTE — CARE PLAN
Problem: Communication  Goal: The ability to communicate needs accurately and effectively will improve  Outcome: PROGRESSING SLOWER THAN EXPECTED      Problem: Skin Integrity  Goal: Risk for impaired skin integrity will decrease  Outcome: PROGRESSING AS EXPECTED    Intervention: Implement precautions to protect skin integrity in collaboration with the interdisciplinary team   04/20/18 0900 04/22/18 0900 04/23/18 0600   OTHER   Skin Preventative Measures --  --  Pillows in Use to Float Heels;Heel Float Boots in Use    Bed Types --  --  Low Air Loss Mattress   Moisturizers --  --  Barrier Wipes   PT / OT Involved in Care Physical Therapy Involved;Occupational Therapy Involved --  --    Patient Turns / Repositioning --  --  Right Side   Assistance / Tolerance for Turning/Repositioning --  --  Assistance of Two or More;Tolerates Well   Patient is Receiving Nutrition --  Nothing by Mouth --

## 2018-04-23 NOTE — PROGRESS NOTES
[facility] Hospitalist Progress Note    Date of Service: 2018    Chief Complaint  77 y.o. male admitted 2018 with confusion.    Mr. Connor has a history of CKD and cerebral ataxia, he is wheelchair bound at baseline. A Patient was being worked up for facial droop at medical facility near his home in [facility]. MRI of the brain done on 2018 was negative for acute pathology.  Shortly after the MRI, he developed confusion was taken to emergency department. Workup there included a troponin which was elevated, the patient was transported by care flight to [facility] for higher level care. Renal function and dialysis started on 18 for hyperkalemia.  Episode concerning for seizure on 18.     Interval Problem Update  Follows commands when propofol is turned down, no further episodes of seizure like activity  SR 60's-70's, titrated off levophed this morning,'s, mantintin  Getting 3rd round of dialysis this morning, UOP 350ml over last 24 hours    Consultants/Specialty  Critical care  Cardiology  Nephrology    Disposition  To be determined, the patient is critically ill in the ICU    Patient discussed on rounds with intensivist, RN, respiratory therapy, and pharmacy.      Review of Systems   Unable to perform ROS: Intubated      Physical Exam  Laboratory/Imaging   Hemodynamics  No data recorded.   Monitored Temp: 37.2 °C (99 °F)  Pulse  Av  Min: 37  Max: 80 Heart Rate (Monitored): 61  NIBP: 102/44 CVP (mm Hg): (!) 14 MM HG    Respiratory  Gibson Vent Mode: APVCMV, Rate (breaths/min): 18, PEEP/CPAP: 8, PEEP/CPAP: 8, FiO2: 40, P Peak (PIP): 20, P MEAN: 12   Respiration: 18, Pulse Oximetry: 98 %     Work Of Breathing / Effort: Vented  RUL Breath Sounds: Clear, RML Breath Sounds: Clear, RLL Breath Sounds: Diminished, CATALINA Breath Sounds: Clear, LLL Breath Sounds: Clear    Fluids    Intake/Output Summary (Last 24 hours) at 18 0958  Last data filed at 18 0941   Gross per 24 hour   Intake           2112.94 ml   Output             2460 ml   Net          -347.06 ml       Nutrition  Orders Placed This Encounter   Procedures   • Diet NPO     Standing Status:   Standing     Number of Occurrences:   1     Order Specific Question:   Restrict to:     Answer:   Strict [1]     Physical Exam   Constitutional: He appears well-developed and well-nourished. No distress.   HENT:   Head: Normocephalic.   Right Ear: External ear normal.   Left Ear: External ear normal.   Eyes: Conjunctivae and EOM are normal. No scleral icterus.   Cardiovascular: Normal rate, regular rhythm and intact distal pulses.    No murmur heard.  Pulmonary/Chest: Effort normal. He has no wheezes.   Equal chest rise and fall  Mechanical breath sounds bilaterally  No overt wheezing     Abdominal: Soft. Bowel sounds are normal. He exhibits no distension and no mass. There is no rebound.   Musculoskeletal: Normal range of motion. He exhibits no edema.   Neurological: No cranial nerve deficit.   Intubated and sedated  He moves all extremities spontaneously  On sedation vacations he'll follow commands   Skin: Skin is warm and dry. No rash noted. No erythema.   Vitals reviewed.      Recent Labs      04/21/18 0458 04/22/18   0512  04/22/18   1815  04/23/18 0457   WBC  10.7   --   8.1   --   11.5*   RBC  2.65*   --   2.37*   --   2.53*   HEMOGLOBIN  8.3*   < >  7.3*  7.4*  7.8*   HEMATOCRIT  24.1*   < >  21.6*  22.0*  23.6*   MCV  90.9   --   91.1   --   93.3   MCH  31.3   --   30.8   --   30.8   MCHC  34.4   --   33.8   --   33.1*   RDW  57.1*   --   55.5*   --   55.4*   PLATELETCT  267   --   207   --   234   MPV  10.0   --   10.6   --   10.5    < > = values in this interval not displayed.     Recent Labs      04/21/18 0458 04/22/18   0512  04/23/18 0457   SODIUM  131*  134*  137   POTASSIUM  6.5*  4.2  4.0   CHLORIDE  105  101  101   CO2  14*  23  25   GLUCOSE  177*  300*  155*   BUN  54*  43*  41*   CREATININE  3.18*  2.54*  2.38*    CALCIUM  7.8*  8.4*  8.2*             Recent Labs      04/22/18   0512   TRIGLYCERIDE  446*          Assessment/Plan     * Encephalopathy- (present on admission)   Assessment & Plan    Patient presented with altered mental status  Causes unclear, MRI of the brain showed no evidence of pathology  Concern for seizure overnight on 4/21  Patient appears to be neurologically intact, he follows commands  EEG today        Shock circulatory (CMS-HCC)- (present on admission)   Assessment & Plan    Improved, the patient is off pressors today        Acute on chronic kidney failure (CMS-HCC)- (present on admission)   Assessment & Plan    Patient has been started on dialysis for hyperkalemia and acute on chronic renal failure  Potassium is improved  Dialysis as per nephrology        Acute blood loss anemia- (present on admission)   Assessment & Plan    Patient had drop in his hemoglobin  Etiology unclear, he does not appear to have significant GI bleed  CT scan of the abdomen and pelvis 4/20/2018 shows no evidence of retroperitoneal hemorrhage  Monitor hemoglobin, transfuse as needed        Acute respiratory failure (CMS-HCC)- (present on admission)   Assessment & Plan    Patient is intubated  Vent support as per pulmonary        Diabetes mellitus type 2, controlled (CMS-HCC)- (present on admission)   Assessment & Plan    Blood sugars a little better today, still uncontrolled with FSBG's in the 200's  Lantus and sliding scale  Hold oral hypoglycemics while the patient is inpatient        Dyslipidemia- (present on admission)   Assessment & Plan    On statin therapy.  Monitor.         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

## 2018-04-23 NOTE — PROGRESS NOTES
"Pulmonary Critical Care Progress Note        Date of Admission:  4/21/2018    Chief Complaint: ALOC    History of Present Illness: 77-year-old  lives in [place], [place].    Apparently yesterday, he began to have slurred   speech and some right-sided facial droop.  The wife took him to the hospital.    He had an MRI which apparently was normal.  This morning the wife noted him   to be somewhat confused and acting \"spacey.\"  He presented back to the   hospital in [place].  He was reportedly bradycardic into the 20s.  He was given   atropine with improvement in his heart rate and mentation.  He was transferred   to [facility] for subspecialty care.  After arriving at [facility], he has developed   hypoxemia.  He was somewhat combative.  He was requiring increasing amounts of   oxygen and he was intubated.  He has also been found to be hypoglycemic.     Dextrose was administered.  He also had elevated potassium and he has received   calcium.  A rectal temperature reveals that his temperature is 90 degrees and   he is now being externally warmed with a Carolynn Hugger.    ROS:  Respiratory: unable to perform due to the patient's inability to effectively communicate, Cardiac: unable to perform due to the patient's inability to effectively communicate, GI: unable to perform due to the patient's inability to effectively communicate.      Interval Events:  24 hour interval history reviewed     Remains on Vent day #4  Follows commands with sedation vacations  No Sz overnight  SR 60-70s  Bp 110-120s  NE drip 1 - weaning off this AM  Afebrile  TF goal 45  Propofol  [place]  HD just started  No SBT  CXR wet still - no CM, lines/ET ok  PPI  Hct better   Azith/Unasyn  BAL neg so far  BC neg  LFTs slt up  HC 50 Q8    YESTERDAY   - vent day 3, 8, 40%, decrease RR   - PPI for ? GIB   - Hgb down to 7.3   - Plts 207   - Unasyn/azithro   - HD day #2   - ? sz activity   - solucortef, decrease to 50 q 8   - add lantus, SSI   - off epi day; on vaospressin .03, " norepi 1   - TGs up    PFSH:  No change.    General:  Appears acute and chronically ill, full vent, appears older than stated age    Skin:  Warm and dry, no rash or lesion    Respiratory:  Gibson Vent Mode: APVCMV, Rate (breaths/min): 18, Vt Target (mL): 450, PEEP/CPAP: 8, FiO2: 40, Static Compliance (ml / cm H2O): 57, Control VTE (exp VT): 449  Pulse Oximetry: 100 %  Ventilator mechanics and waveforms are reviewed at the bedside with RT        Exam: unlabored respirations, no intercostal retractions or accessory muscle use    Diminished BS at bases - few BB coarse rhonchi    ImagingCXR  I have personally reviewed the chest x-ray my impression is  noted above and films are reviewed with Team on rounds     Recent Labs      04/21/18   0454  04/22/18   0517   ISTATAPH  7.344*  7.564*   ISTATAPCO2  24.8*  24.9*   ISTATAPO2  111*  63*   ISTATATCO2  14*  23   [id number]  98  95   ISTATARTHCO3  13.5*  22.5   ISTATARTBE  -11*  1   ISTATTEMP  35.4 C  37.1 C   ISTATFIO2  40  40   ISTATSPEC  Arterial  Arterial   ISTATAPHTC  7.367*  7.562*   [id number]  102*  63*       HemoDynamics:  Pulse: (!) 54, Heart Rate (Monitored): 64  NIBP: (!) 89/44  CVP (mm Hg): (!) 14 MM HG    Exam: regular rate and rhythm, no ectopy, sinus bradycardia, no murmur  Imaging: Available data reviewed     ECHO 4/20  CONCLUSIONS  Technically difficult but adequate study for interpretation.   Normal left ventricular chamber size.  Normal left ventricular systolic function.  Left ventricular ejection fraction is visually estimated to be 65%.  Difficult to comment on regional wall motion but mid inferior wall   appeared hypokinetic.  The aortic valve is not well visualized.  Moderate aortic insufficiency.  Mild aortic stenosis. Transvalvular gradients are - Peak: 29  mmHg,   Mean: 12 mmHg.  Trace mitral regurgitation.  The left atrium is normal in size.  Normal inferior vena cava size without inspiratory collapse.  The right ventricle was normal in size  and function.  Compared to prior study 4/2014, inferior wall motion abnormality is now   Seen    Recent Labs      04/20/18   0545  04/20/18   1440   TROPONINI  0.40*  0.36*       Neuro:  GCS Total Valdemar Coma Score: 9       Exam: Heavily sedated, agitated, not following  Imaging: Available data reviewed    Fluids:  Intake/Output       04/21/18 0700 - 04/22/18 0659 04/22/18 0700 - 04/23/18 0659 04/23/18 0700 - 04/24/18 0659      0700-1859 1900-0659 Total 0700-1859 1900-0659 Total 0700-1859 1900-0659 Total       Intake    I.V.  368  193.2 561.2  228.5  215.4 443.9  --  -- --    Vasopressin Volume 108 72 180 79.5 0 79.5 -- -- --    Propofol Volume 185.6 117.6 303.2 149 148.9 297.9 -- -- --    Norepinephrine Volume 74.4 3.6 78 -- 66.5 66.5 -- -- --    Other  --  -- --  --  30 30  --  -- --    Medications (P.O./ Enteral Liquids) -- -- -- -- 30 30 -- -- --    Dialysis  500  -- 500  600  -- 600  --  -- --    Dialysis Volume (Dialysis Intake) 500 -- 500 600 -- 600 -- -- --    Enteral  480  400 880  480  350 830  --  -- --    Enteral Volume 480 400 880 480 320 800 -- -- --    Free Water / Tube Flush -- -- -- -- 30 30 -- -- --    Total Intake 1348 51941.2 1308.5 595.4 1903.9 -- -- --       Output    Urine  365  235 600  150  60 210  --  -- --    Indwelling Cathether 365 235 600 150 60 210 -- -- --    Drains  0  0 0  --  0 0  --  -- --    Residual Amount (ml) (Discarded) 0 0 0 -- 0 0 -- -- --    Dialysis  2500  -- 2500 2050 -- 2050  --  -- --    Dialysis Output (Dialysis Output) 2500 -- 2500 2050 -- 2050 -- -- --    Stool  --  -- --  --  -- --  --  -- --    Number of Times Stooled 3 x 3 x 6 x -- -- -- -- -- --    Total Output 2865 235 3100 2200 60 2260 -- -- --       Net I/O     -1517 358.2 -1158.8 -891.5 535.4 -356.1 -- -- --           Recent Labs      04/20/18   0545  04/20/18   1440  04/21/18   0458  04/22/18   0512   SODIUM   --   134*  131*  134*   POTASSIUM   --   4.9  6.5*  4.2   CHLORIDE   --   106  105  101    CO2   --   14*  14*  23   BUN   --   54*  54*  43*   CREATININE   --   2.78*  3.18*  2.54*   MAGNESIUM  2.4   --   2.0  2.0   PHOSPHORUS  5.6*   --   5.9*  4.2   CALCIUM   --   8.2*  7.8*  8.4*       GI/Nutrition:  Exam: abdomen is soft and non-tender, normal active bowel sounds  Imaging: Available data reviewed  NPO  Liver Function  Recent Labs      18   14418   ALTSGPT  19   --    --    ASTSGOT  35   --    --    ALKPHOSPHAT  138*   --    --    TBILIRUBIN  0.3   --    --    GLUCOSE  130*  177*  300*       Heme:  Recent Labs      18   18118   RBC  2.65*   --   2.37*   --   2.53*   HEMOGLOBIN  8.3*   < >  7.3*  7.4*  7.8*   HEMATOCRIT  24.1*   < >  21.6*  22.0*  23.6*   PLATELETCT  267   --   207   --   234    < > = values in this interval not displayed.       Infectious Disease:  Monitored Temp  Av.4 °C (99.4 °F)  Min: 36.7 °C (98.1 °F)  Max: 37.8 °C (100 °F)  Micro: reviewed  Recent Labs      18   14418   WBC  9.1  10.7  8.1  11.5*   NEUTSPOLYS  87.90*  91.00*  89.10*  82.50*   LYMPHOCYTES  5.90*  3.60*  5.90*  10.30*   MONOCYTES  5.40  4.80  4.30  6.00   EOSINOPHILS  0.20  0.00  0.00  0.10   BASOPHILS  0.20  0.10  0.20  0.20   ASTSGOT  35   --    --    --    ALTSGPT  19   --    --    --    ALKPHOSPHAT  138*   --    --    --    TBILIRUBIN  0.3   --    --    --      Current Facility-Administered Medications   Medication Dose Frequency Provider Last Rate Last Dose   • LORazepam (ATIVAN) injection 1-2 mg  1-2 mg Q HOUR PRN Manuel Lombardo M.D.       • levETIRAcetam (KEPPRA) 1,000 mg in  mL IVPB  1,000 mg Q12HRS Manuel Lombardo M.D.   Stopped at 18 0136   • heparin injection 3,000 Units  3,000 Units DIALYSIS PRN Fadi Najjar, M.D.   3,000 Units at 18 1036   • hydrocortisone sodium succinate PF (SOLU-CORTEF) 100 MG injection 50 mg   50 mg Q8HRS Stephon Trejo M.D.   50 mg at 04/22/18 2038   • insulin glargine (LANTUS) injection 10 Units  10 Units Q EVENING Stephon Trejo M.D.   10 Units at 04/22/18 2040   • ampicillin/sulbactam (UNASYN) 3 g in  mL IVPB  3 g Q24HRS Bryan Meza M.D.       • lidocaine (XYLOCAINE) 1 % solution  1 mL PRN Fadi Najjar, M.D.       • azithromycin (ZITHROMAX) tablet 250 mg  250 mg DAILY Stephon Trejo M.D.   250 mg at 04/22/18 0755   • omeprazole 2 mg/mL in sodium bicarbonate (PRILOSEC) oral susp 40 mg  40 mg DAILY Stephon Trejo M.D.   40 mg at 04/22/18 0755   • Respiratory Care per Protocol   Continuous RT Manuel Lombardo M.D.       • chlorhexidine (PERIDEX) 0.12 % solution 15 mL  15 mL BID Manuel Lombardo M.D.   15 mL at 04/22/18 2038   • fentaNYL (SUBLIMAZE) injection 25 mcg  25 mcg Q HOUR PRN Manuel Lombardo M.D.        Or   • fentaNYL (SUBLIMAZE) injection 50 mcg  50 mcg Q HOUR PRN Manuel Lombardo M.D.        Or   • fentaNYL (SUBLIMAZE) injection 100 mcg  100 mcg Q HOUR PRN Manuel Lombardo M.D.   100 mcg at 04/20/18 1827   • ipratropium-albuterol (DUONEB) nebulizer solution 3 mL  3 mL Q2HRS PRN (RT) Manuel Lombardo M.D.       • ipratropium-albuterol (DUONEB) nebulizer solution 3 mL  3 mL Q4HRS (RT) Manuel Lombardo M.D.   3 mL at 04/23/18 0343   • insulin regular (HUMULIN R) injection 2-9 Units  2-9 Units Q6HRS Manuel Lombardo M.D.   2 Units at 04/23/18 0027    And   • glucose 4 g chewable tablet 16 g  16 g Q15 MIN PRN Manuel Lombardo M.D.        And   • dextrose 50% (D50W) injection 25 mL  25 mL Q15 MIN PRN Manuel Lombardo M.D.       • ondansetron (ZOFRAN) syringe/vial injection 4 mg  4 mg Q4HRS PRN Hernan Dallas M.D.       • propofol (DIPRIVAN) injection  0-80 mcg/kg/min Continuous Manuel Lombardo M.D. 20.6 mL/hr at 04/23/18 0341 35 mcg/kg/min at 04/23/18 0341   • Pharmacy Consult: Enteral tube feeding - review meds/change  route/product selection  1 Each PRN Bryan Meza M.D.       • senna-docusate (PERICOLACE or SENOKOT S) 8.6-50 MG per tablet 2 Tab  2 Tab BID Stephon Trejo M.D.   2 Tab at 04/22/18 2038    And   • polyethylene glycol/lytes (MIRALAX) PACKET 1 Packet  1 Packet QDAY PRN Stephon Trejo M.D.        And   • magnesium hydroxide (MILK OF MAGNESIA) suspension 30 mL  30 mL QDAY PRN Stephon Trejo M.D.        And   • bisacodyl (DULCOLAX) suppository 10 mg  10 mg QDAY PRN Stephon Trejo M.D.       • acetaminophen (TYLENOL) tablet 650 mg  650 mg Q6HRS PRN Stephon Trejo M.D.   650 mg at 04/20/18 2123   • ondansetron (ZOFRAN ODT) dispertab 4 mg  4 mg Q4HRS PRN Stephon Trejo M.D.       • norepinephrine (LEVOPHED) 8 mg in  mL Infusion  0-30 mcg/min Continuous Stephon Trejo M.D. 9.4 mL/hr at 04/23/18 0408 5 mcg/min at 04/23/18 0408     Last reviewed on 4/20/2018 12:11 PM by Alley Wolfe, [facility]    Quality  Measures:   Labs reviewed, Medications reviewed and Radiology images reviewed                      Problems:  Acute hypoxemic respiratory failure   - intubated 4/20   - full vent support; vent settings adjusted today again   - not appropriate for liberation   - serial ABG/CXR/vent mechanics review   - A- F bundle   - Mobilize/sedation vacations as tolerated   - wean Propofol - swap to DEX?  Acute unspecified encephalopathy   - CT scan of the head in [place] is negative    - MRI negative for acute pathology   - ? Toxic/metabolic; w/u in progress, r/o sz   - serial lab/exam - limit sedation   - Neuro eval pending   - pending EEG?  Hypotension, unspecified   - IVF, titrated vasopressors   - wean HC when off NE 24 hrs   - w/u as above  Query pneumonia  Hypothermia.  Acute blood loss anemia   - ? Source, doubt hemolysis, no evidence of GI loss   - CT  abdomen and pelvis - no retroperitoneal hemorrhage or ischemic bowel   - serial H/H - transfuse PRN  Acute kidney injury, on CKD?   - started daily RRT   - HD catheter   - nephro  following   - Renally dose meds - avoid nephrotoxins  Elevated LFTs   - trend - etiology?  Hyperkalemia   - s/p Calcium, bicarb    - RRT  Hyponatremia  Elevated lipase  Positive troponin   - suspect type 2/demand   - no evidence of ACS, trend  Hypoglycemia.  Diabetes mellitus type 2   - glycemic control   - 4/20 ECHO noted  History of systemic arterial hypertension.  Gastroesophageal reflux disease.  Dyslipidemia.  Chronic back pain.  Heparin prophylaxis, PPI to pepcid  Cards signed off per RN    He remains very critically ill at high risk for further deterioration. I have adjusted vent settings, I have titrated vasopressors.  Further Neuro eval pending.    Discussed patient condition and risk of morbidity and/or mortality with RN, RT, Pharmacy, Charge nurse / hot rounds and hospitalist.    The patient remains critically ill. Additional critical care time = 40 minutes in directly providing and coordinating critical care and extensive data review.  No time overlap and excludes procedures.

## 2018-04-23 NOTE — CARE PLAN
Problem: Ventilation Defect:  Goal: Ability to achieve and maintain unassisted ventilation or tolerate decreased levels of ventilator support    Intervention: Support and monitor invasive and noninvasive mechanical ventilation  Adult Ventilation Update    Total Vent Days: 4    Patient Lines/Drains/Airways Status    Active Airway     Name: Placement date: Placement time: Site: Days:    Airway Group ET Tube Oral 8.0 @ 26 04/20/18   0206   Oral   4              18 450 +8 40%    Events/Summary/Plan: Patient remains stable on vent, no changes made this shift, will continue to monitor.

## 2018-04-23 NOTE — PROCEDURES
DATE OF SERVICE:  04/23/2018    This is an inpatient EEG that was done on 04/23/2018.    ROUTINE ELECTROENCEPHALOGRAM REPORT        NAME:Geeta Jose ASHLEY Dr:     INDICATION:         TECHNIQUE: 30 channel routine electroencephalogram (EEG) was performed in accordance with the international 10-20 system. The study was reviewed in bipolar and referential montages.      DESCRIPTION OF THE RECORD:        The EEG background consists of mixed theta and delta. There are periodic epileptiform sharp over frontal ( R>L) these does not meet the criteria of electrographic seizures but highly suggestive of focal seizures from the right frontal        ACTIVATION PROCEDURES:             ICTAL AND/OR INTERICTAL FINDINGS:          EKG: sampling of the EKG recording demonstrated arrhythm.          INTERPRETATION:           ________________________________________________________________________     This scalp EEG is consistent with                  1. Diffuse nonspecific cortical dysfunction - moderate to severe                 2. Focal cortical dysfunction and irritability over frontal -- R>L , focal seizures are likely     There are no electrographic seizures in this short record though     Advise anti-seizure medication if the patient remains confused     EEG 04/23/18 2:32 PM     ________________________________________________________________________                            ____________________________________     MD EVELIN FERGUSON / JERICHO    DD:  04/23/2018 14:35:17  DT:  04/23/2018 14:47:08    D#:  [id number]  Job#:  [id number]

## 2018-04-24 PROBLEM — R00.1 BRADYCARDIA: Status: ACTIVE | Noted: 2018-01-01

## 2018-04-24 PROBLEM — R56.9 SEIZURE (HCC): Status: ACTIVE | Noted: 2018-01-01

## 2018-04-24 NOTE — THERAPY
Pt continues to be inappropriate for tx. Will cancel OT order. Please re-order when appropriate and able.    Leticia Harman, OTR/L  Pager: [phone]

## 2018-04-24 NOTE — PROGRESS NOTES
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     Seen on dialysis today, seems to be tolerating it well.    Review of Systems:  Review of Systems   Unable to perform ROS: Intubated       Physical Exam:  Physical Exam   Constitutional: He has a sickly appearance. He appears ill. He is intubated.   HENT:   Head: Normocephalic and atraumatic.   Cardiovascular: Normal rate and regular rhythm.    Pulmonary/Chest: Effort normal and breath sounds normal. He is intubated.   Musculoskeletal: He exhibits no edema or tenderness.   Skin: Skin is warm and dry.       Labs:  Recent Labs      04/22/18   0517  04/23/18   0512  04/24/18   0504   ISTATAPH  7.564*  7.531*  7.530*   ISTATAPCO2  24.9*  32.1  34.8   ISTATAPO2  63*  125*  72   ISTATATCO2  23  28  30   [id number]  95  99  96   ISTATARTHCO3  22.5  26.9*  29.0*   ISTATARTBE  1  4*  6*   ISTATTEMP  37.1 C  36.9 C  36.1 C   ISTATFIO2  40  40  30   ISTATSPEC  Arterial  Arterial  Arterial   ISTATAPHTC  7.562*  7.533*  7.544*   [id number]  63*  125*  68     Recent Labs      04/23/18   1139   CPKTOTAL  223*     Recent Labs      04/22/18   0512  04/23/18   0457  04/24/18   0513   SODIUM  134*  137  132*   POTASSIUM  4.2  4.0  4.2   CHLORIDE  101  101  97   CO2  23  25  25   BUN  43*  41*  40*   CREATININE  2.54*  2.38*  2.11*   MAGNESIUM  2.0  2.1   --    PHOSPHORUS  4.2  3.7   --    CALCIUM  8.4*  8.2*  8.0*     Recent Labs      04/22/18   0512  04/23/18   0457  04/23/18   1139  04/24/18   0513   ALTSGPT   --   53*   --    --    ASTSGOT   --   102*   --    --    ALKPHOSPHAT   --   429*   --    --    TBILIRUBIN   --   0.7   --    --    GAMMAGT   --    --   494*   --    PREALBUMIN   --   16.0*   --     --    GLUCOSE  300*  155*   --   218*     Recent Labs      18   0512   18   0457  18   1753  18   0513   RBC  2.37*   --   2.53*   --   2.47*   HEMOGLOBIN  7.3*   < >  7.8*  7.3*  7.7*   HEMATOCRIT  21.6*   < >  23.6*  22.3*  25.3*   PLATELETCT  207   --   234   --   170   PROTHROMBTM   --    --    --    --   13.6   INR   --    --    --    --   1.07    < > = values in this interval not displayed.     Recent Labs      18   0518   0513   WBC  8.1  11.5*  8.1   NEUTSPOLYS  89.10*  82.50*  80.10*   LYMPHOCYTES  5.90*  10.30*  11.70*   MONOCYTES  4.30  6.00  6.40   EOSINOPHILS  0.00  0.10  0.20   BASOPHILS  0.20  0.20  0.40   ASTSGOT   --   102*   --    ALTSGPT   --   53*   --    ALKPHOSPHAT   --   429*   --    TBILIRUBIN   --   0.7   --            Hemodynamics:  Temp (24hrs), Av.6 °C (97.8 °F), Min:35.6 °C (96.1 °F), Max:36.9 °C (98.4 °F)  Temperature: (!) 35.6 °C (96.1 °F), Monitored Temp: 36.1 °C (97 °F)  Pulse  Av.9  Min: 37  Max: 80Heart Rate (Monitored): (!) 58  NIBP: 119/85    Respiratory:  Gibson Vent Mode: APVCMV, Rate (breaths/min): 12, PEEP/CPAP: 8, FiO2: 30, P Peak (PIP): 15, P MEAN: 11 Respiration: 16, Pulse Oximetry: 99 %     Work Of Breathing / Effort: Vented  RUL Breath Sounds: Clear;Diminished, RML Breath Sounds: Diminished, RLL Breath Sounds: Diminished, CATALINA Breath Sounds: Clear;Diminished, LLL Breath Sounds: Diminished  Fluids:    Intake/Output Summary (Last 24 hours) at 18 1137  Last data filed at 18 1000   Gross per 24 hour   Intake             2205 ml   Output             4405 ml   Net            -2200 ml     Weight: 88.7 kg (195 lb 8.8 oz)  GI/Nutrition:  Orders Placed This Encounter   Procedures   • Diet NPO     Standing Status:   Standing     Number of Occurrences:   1     Order Specific Question:   Restrict to:     Answer:   Strict [1]     Medical Decision Making, by Problem:  Active Hospital Problems    Diagnosis    • *Encephalopathy [G93.40]   • Shock circulatory (CMS-HCC) [R57.9]   • Acute respiratory failure (CMS-HCC) [J96.00]   • Acute blood loss anemia [D62]   • Diabetes mellitus type 2, controlled (CMS-HCC) [E11.9]   • Cerebral ataxia (CMS-HCC) [G11.9]   • Hypothermia [T68.XXXA]   • HTN (hypertension) [I10]   • Acute on chronic kidney failure (CMS-HCC) [N17.9, N18.9]   • GERD (gastroesophageal reflux disease) [K21.9]   • Dyslipidemia [E78.5]     1. CAR - Daily HD with UF for now  2. Anemia - Continue epogen  3. Respiratory failure / volume overload - UF as tolerated    Quality-Core Measures

## 2018-04-24 NOTE — PROGRESS NOTES
"Pulmonary Critical Care Progress Note        Date of Admission:  4/21/2018    Chief Complaint: ALOC    History of Present Illness: 77-year-old  lives in [place], [place].    Apparently yesterday, he began to have slurred   speech and some right-sided facial droop.  The wife took him to the hospital.    He had an MRI which apparently was normal.  This morning the wife noted him   to be somewhat confused and acting \"spacey.\"  He presented back to the   hospital in [place].  He was reportedly bradycardic into the 20s.  He was given   atropine with improvement in his heart rate and mentation.  He was transferred   to [facility] for subspecialty care.  After arriving at [facility], he has developed   hypoxemia.  He was somewhat combative.  He was requiring increasing amounts of   oxygen and he was intubated.  He has also been found to be hypoglycemic.     Dextrose was administered.  He also had elevated potassium and he has received   calcium.  A rectal temperature reveals that his temperature is 90 degrees and   he is now being externally warmed with a Carolynn Hugger.    ROS:  Respiratory: unable to perform due to the patient's inability to effectively communicate, Cardiac: unable to perform due to the patient's inability to effectively communicate, GI: unable to perform due to the patient's inability to effectively communicate.      Interval Events:  24 hour interval history reviewed     Vent day #5  Agitated /sedated  EEG suggest focal Sz?  Keppra started 4/22  Neuro consult still pending  HD this AM   Hemodynamics better  SR/SB - PVCs  SBp - off NE - 130s  Afeb  DEX 1.5  Following better after HD  TF goal  UO better - still low  CXR better edema/atx - CM  Azithro - last day  Zosyn day 5  SSI 10 units/24      Serial F/u - agitation better - starting to follow  DEX drip  Hemodynamics no change  Not ready for liberation     YESTERDAY  Remains on Vent day #4  Follows commands with sedation vacations  No Sz overnight  SR 60-70s  Bp " 110-120s  NE drip 1 - weaning off this AM  Afebrile  TF goal 45  Propofol  [place]  HD just started  No SBT  CXR wet still - no CM, lines/ET ok  PPI  Hct better   Azith/Unasyn  BAL neg so far  BC neg  LFTs slt up  HC 50 Q8    PFSH:  No change.    General:  Appears acute and chronically ill, full vent, appears older than stated age, starting to follow    Skin:  Warm and dry, no rash or lesion - no change    Respiratory:  Gibson Vent Mode: APVCMV, Rate (breaths/min): 12, Vt Target (mL): 450, PEEP/CPAP: 8, FiO2: 30, Static Compliance (ml / cm H2O): 113  Pulse Oximetry: 98 %  Ventilator mechanics and waveforms are reviewed at the bedside with RT        Exam: unlabored respirations, no intercostal retractions or accessory muscle use    Diminished BS at bases - few BB coarse rhonchi - better   Min secretions     ImagingCXR  I have personally reviewed the chest x-ray my impression is  noted above and films are reviewed with Team on rounds     Recent Labs      04/22/18   0517  04/23/18   0512  04/24/18   0504   ISTATAPH  7.564*  7.531*  7.530*   ISTATAPCO2  24.9*  32.1  34.8   ISTATAPO2  63*  125*  72   ISTATATCO2  23  28  30   [id number]  95  99  96   ISTATARTHCO3  22.5  26.9*  29.0*   ISTATARTBE  1  4*  6*   ISTATTEMP  37.1 C  36.9 C  36.1 C   ISTATFIO2  40  40  30   ISTATSPEC  Arterial  Arterial  Arterial   ISTATAPHTC  7.562*  7.533*  7.544*   [id number]  63*  125*  68       HemoDynamics:  Pulse: (!) 55, Heart Rate (Monitored): (!) 56  NIBP: 142/53  CVP (mm Hg): (!) 14 MM HG    Exam: regular rate and rhythm, no ectopy, sinus bradycardia, no murmur - no delta  Imaging: Available data reviewed     ECHO 4/20  CONCLUSIONS  Technically difficult but adequate study for interpretation.   Normal left ventricular chamber size.  Normal left ventricular systolic function.  Left ventricular ejection fraction is visually estimated to be 65%.  Difficult to comment on regional wall motion but mid inferior wall   appeared  hypokinetic.  The aortic valve is not well visualized.  Moderate aortic insufficiency.  Mild aortic stenosis. Transvalvular gradients are - Peak: 29  mmHg,   Mean: 12 mmHg.  Trace mitral regurgitation.  The left atrium is normal in size.  Normal inferior vena cava size without inspiratory collapse.  The right ventricle was normal in size and function.  Compared to prior study 4/2014, inferior wall motion abnormality is now   Seen    Recent Labs      04/23/18   1139   CPKTOTAL  223*       Neuro:  GCS Total Valdemar Coma Score: 10     DEX    Exam: no focal deficits noted Heavily sedated but improving - starting to follow and move all 4  Imaging: Available data reviewed     EEG 4/23:  This scalp EEG is consistent with               1. Diffuse nonspecific cortical dysfunction - moderate to severe              2. Focal cortical dysfunction and irritability over frontal -- R>L , focal seizures are likely  There are no electrographic seizures in this short record though  Advise anti-seizure medication if the patient remains confused    Fluids:  Intake/Output       04/22/18 0700 - 04/23/18 0659 04/23/18 0700 - 04/24/18 0659 04/24/18 0700 - 04/25/18 0659      2764-0143 0287-1575 Total 2011-2169 5707-2034 Total 5599-1850 9909-3255 Total       Intake    I.V.  228.5  274.6 503.1  403.9  328.2 732.1  --  -- --    Precedex Volume -- -- -- 88 328.2 416.2 -- -- --    Vasopressin Volume 79.5 0 79.5 -- -- -- -- -- --    Propofol Volume 149 190.1 339.1 97.1 -- 97.1 -- -- --    Norepinephrine Volume -- 84.6 84.6 18.8 -- 18.8 -- -- --    IV Piggyback Volume (IV Piggyback) -- -- -- 200 -- 200 -- -- --    Other  --  30 30  60  60 120  --  -- --    Medications (P.O./ Enteral Liquids) -- 30 30 60 60 120 -- -- --    Dialysis  600  -- 600  --  -- --  --  -- --    Dialysis Volume (Dialysis Intake) 600 -- 600 -- -- -- -- -- --    Enteral  480  430 910  450  490 940  --  -- --    Enteral Volume 480 400 750 400 400 800 -- -- --    Free Water /  Tube Flush -- 30 30 50 90 140 -- -- --    Total Intake 1308.5 734.6 2043.1 913.9 878.2 1792.1 -- -- --       Output    Urine  150  200 350  375  -- 375  --  -- --    Indwelling Cathether 150 200 350 375 -- 375 -- -- --    Drains  --  0 0  --  -- --  --  -- --    Residual Amount (ml) (Discarded) -- 0 0 -- -- -- -- -- --    Dialysis    --   1500  -- 1500  --  -- --    Dialysis Output (Dialysis Output) 2050-  1500 -- 1500 -- -- --    Stool  --  -- --  --  -- --  --  -- --    Number of Times Stooled -- -- -- -- 0 x 0 x -- -- --    Total Output 2200 200 2400 1875 -- 1875 -- -- --       Net I/O     -891.5 534.6 -356.9 -961.1 878.2 -82.9 -- -- --           Recent Labs      18   0457   SODIUM  134*  137   POTASSIUM  4.2  4.0   CHLORIDE  101  101   CO2  23  25   BUN  43*  41*   CREATININE  2.54*  2.38*   MAGNESIUM  2.0  2.1   PHOSPHORUS  4.2  3.7   CALCIUM  8.4*  8.2*       GI/Nutrition:  Exam: abdomen is soft and non-tender, normal active bowel sounds, no CVAT  Imaging: Available data reviewed  NPO and tube feed Tolerated  Liver Function  Recent Labs      18   1139   ALTSGPT   --   53*   --    ASTSGOT   --   102*   --    ALKPHOSPHAT   --   429*   --    TBILIRUBIN   --   0.7   --    GAMMAGT   --    --   494*   PREALBUMIN   --   16.0*   --    GLUCOSE  300*  155*   --        Heme:  Recent Labs      18   05187  18   1753  18   0513   RBC  2.37*   --   2.53*   --   2.47*   HEMOGLOBIN  7.3*   < >  7.8*  7.3*  7.7*   HEMATOCRIT  21.6*   < >  23.6*  22.3*  25.3*   PLATELETCT  207   --   234   --   170    < > = values in this interval not displayed.       Infectious Disease:  Monitored Temp  Av.9 °C (98.5 °F)  Min: 36.1 °C (97 °F)  Max: 37.4 °C (99.3 °F)  Temp  Av.7 °C (98 °F)  Min: 36.1 °C (97 °F)  Max: 36.9 °C (98.4 °F)  Micro: reviewed  Recent Labs      18   0512  18   0457  18   0513   WBC   8.1  11.5*  8.1   NEUTSPOLYS  89.10*  82.50*  80.10*   LYMPHOCYTES  5.90*  10.30*  11.70*   MONOCYTES  4.30  6.00  6.40   EOSINOPHILS  0.00  0.10  0.20   BASOPHILS  0.20  0.20  0.40   ASTSGOT   --   102*   --    ALTSGPT   --   53*   --    ALKPHOSPHAT   --   429*   --    TBILIRUBIN   --   0.7   --      Current Facility-Administered Medications   Medication Dose Frequency Provider Last Rate Last Dose   • HEPARIN SODIUM (PORCINE) 1000 UNIT/ML INJ SOLN           • famotidine (PEPCID) tablet 20 mg  20 mg DAILY Domingo Randall M.D.       • dexmedetomidine (PRECEDEX) 400 mcg in D5W 100 mL infusion  0.1-1.5 mcg/kg/hr Continuous Domingo Randall M.D. 34.2 mL/hr at 04/24/18 0507 1.5 mcg/kg/hr at 04/24/18 0507   • [START ON 4/25/2018] epoetin norah (EPOGEN,PROCRIT) injection 2,000 Units  2,000 Units MO, WE + FR Wallace Rahman M.D.        And   • [START ON 4/25/2018] epoetin norah (EPOGEN,PROCRIT) injection 3,000 Units  3,000 Units MO, WE + FR Wallace Rahman M.D.       • alteplase (CATHFLO) syringe 2 mg  2 mg Once Domingo Randall M.D.   Stopped at 04/23/18 1215   • LORazepam (ATIVAN) injection 1-2 mg  1-2 mg Q HOUR PRN Manuel Lombardo M.D.       • levETIRAcetam (KEPPRA) 1,000 mg in  mL IVPB  1,000 mg Q12HRS Manuel Lombardo M.D.   Stopped at 04/24/18 0017   • heparin injection 3,000 Units  3,000 Units DIALYSIS PRN Fadi Najjar, M.D.   3,000 Units at 04/22/18 1036   • hydrocortisone sodium succinate PF (SOLU-CORTEF) 100 MG injection 50 mg  50 mg Q8HRS Stephon Trejo M.D.   50 mg at 04/24/18 0507   • insulin glargine (LANTUS) injection 10 Units  10 Units Q EVENING Stephon Trejo M.D.   10 Units at 04/23/18 2045   • ampicillin/sulbactam (UNASYN) 3 g in  mL IVPB  3 g Q24HRS Bryan Meza M.D.   Stopped at 04/23/18 1400   • lidocaine (XYLOCAINE) 1 % solution  1 mL PRN Fadi Najjar, M.D.       • azithromycin (ZITHROMAX) tablet 250 mg  250 mg DAILY Stephon Trejo M.D.   250 mg at 04/23/18 0904   •  Respiratory Care per Protocol   Continuous RT Manuel Lombardo M.D.       • chlorhexidine (PERIDEX) 0.12 % solution 15 mL  15 mL BID Manuel Lombardo M.D.   15 mL at 04/23/18 2033   • fentaNYL (SUBLIMAZE) injection 25 mcg  25 mcg Q HOUR PRN Manuel Lombardo M.D.        Or   • fentaNYL (SUBLIMAZE) injection 50 mcg  50 mcg Q HOUR PRN Manuel Lombardo M.D.        Or   • fentaNYL (SUBLIMAZE) injection 100 mcg  100 mcg Q HOUR PRN Manuel Lombardo M.D.   100 mcg at 04/20/18 1827   • ipratropium-albuterol (DUONEB) nebulizer solution 3 mL  3 mL Q2HRS PRN (RT) Manuel Lombardo M.D.       • ipratropium-albuterol (DUONEB) nebulizer solution 3 mL  3 mL Q4HRS (RT) Manuel Lombardo M.D.   3 mL at 04/24/18 0305   • insulin regular (HUMULIN R) injection 2-9 Units  2-9 Units Q6HRS Manuel Lombardo M.D.   3 Units at 04/24/18 0515    And   • glucose 4 g chewable tablet 16 g  16 g Q15 MIN PRN Manuel Lombardo M.D.        And   • dextrose 50% (D50W) injection 25 mL  25 mL Q15 MIN PRN Manuel Lombardo M.D.       • ondansetron (ZOFRAN) syringe/vial injection 4 mg  4 mg Q4HRS PRN Hernan Dallas M.D.       • propofol (DIPRIVAN) injection  0-80 mcg/kg/min Continuous Manuel Lombardo M.D.   Stopped at 04/23/18 1342   • Pharmacy Consult: Enteral tube feeding - review meds/change route/product selection  1 Each PRN Bryan Meza M.D.       • senna-docusate (PERICOLACE or SENOKOT S) 8.6-50 MG per tablet 2 Tab  2 Tab BID Stephon Trejo M.D.   2 Tab at 04/23/18 2033    And   • polyethylene glycol/lytes (MIRALAX) PACKET 1 Packet  1 Packet QDAY PRN Stephon Trejo M.D.        And   • magnesium hydroxide (MILK OF MAGNESIA) suspension 30 mL  30 mL QDAY PRN Stephon Trejo M.D.        And   • bisacodyl (DULCOLAX) suppository 10 mg  10 mg QDAY PRN Stephon Trejo M.D.       • acetaminophen (TYLENOL) tablet 650 mg  650 mg Q6HRS PRN Stephon Trejo M.D.   650 mg at 04/20/18 2123   • ondansetron  (ZOSAEID ODT) dispertab 4 mg  4 mg Q4HRS PRN Stephon Trejo M.D.       • norepinephrine (LEVOPHED) 8 mg in  mL Infusion  0-30 mcg/min Continuous Stephon Trejo M.D.   Stopped at 04/23/18 0918     Last reviewed on 4/20/2018 12:11 PM by Alley Wolfe, [facility]    Quality  Measures:  Labs reviewed, Medications reviewed and Radiology images reviewed                      Problems:  Acute hypoxemic respiratory failure   - intubated 4/20   - full vent support; vent settings adjusted today again   - not appropriate for liberation   - serial ABG/CXR/vent mechanics review   - A- F bundle   - Mobilize/sedation vacations as tolerated   - wean Propofol - swap to DEX?  Acute unspecified encephalopathy   - CT scan of the head in [place] is negative    - MRI negative for acute pathology   - ? Toxic/metabolic; w/u in progress, r/o sz   - serial lab/exam - limit sedation   - KEPPRA started 4/22   - EEG suggest focal Sz 4/23   - Neuro eval pending   - f/u EEG - cont vEEG if persisting issue   - LOC/agitation better! 4/24  Hypotension, unspecified   - IVF, titrated vasopressors   - wean HC when off NE 24 hrs   - w/u as above  Query pneumonia  Hypothermia.  Acute blood loss anemia   - ? Source, doubt hemolysis, no evidence of GI loss   - CT  abdomen and pelvis - no retroperitoneal hemorrhage or ischemic bowel   - serial H/H - transfuse PRN  Acute kidney injury, on CKD?   - started daily RRT   - HD catheter   - nephro following   - Renally dose meds - avoid nephrotoxins  Elevated LFTs   - trend - etiology?  Hyperkalemia   - s/p Calcium, bicarb    - RRT  Hyponatremia  Elevated lipase  Positive troponin   - suspect type 2/demand   - no evidence of ACS, trend  Hypoglycemia.  Diabetes mellitus type 2   - glycemic control   - 4/20 ECHO noted  History of systemic arterial hypertension.  Gastroesophageal reflux disease.  Dyslipidemia.  Chronic back pain.  Heparin prophylaxis, PPI to pepcid  Cards signed off per RN    Continue Keppra - monitor for  Sz  Neuro eval pending still  Wean DEX as tolerated  SQ heparin prophylaxis now ok -  follow Hct  Wean HC - off pressors  Mobilize as tolerated    Updated wife at bedside x 2    Prognosis very guarded.  Critically ill with unstable neuro/pulmonary status on full vent support.  High risk of deterioration and worsening vital organ dysfunction and death without the above critical care interventions.    He remains very critically ill at high risk for further deterioration. I have adjusted vent settings, I have titrated vasopressors.  Further Neuro eval pending.    Discussed patient condition and risk of morbidity and/or mortality with RN, RT, Pharmacy, Charge nurse / hot rounds and hospitalist.    The patient remains critically ill. Additional critical care time = 38 minutes in directly providing and coordinating critical care and extensive data review.  No time overlap and excludes procedures.

## 2018-04-24 NOTE — PROGRESS NOTES
[facility] Hospitalist Progress Note    Date of Service: 2018    Chief Complaint  77 y.o. male admitted 2018 with altered mental status.    Interval Problem Update  Mr. Connor has a history of diabetes and hypertension that presented to the ER in [place], [place] with slurred speech and right facial droop. He developed confusion and bradycardia. He had an MRI of the brain which was negative for acute processes. He was transferred to [facility] and has required intubation due to inability to protect his airway.   An EEG was done on  which is suggestive of seizure activity and he is on IV Keppra.  He is tolerating cortrak feeding.  He is on precedex at 1.5. RN notes no seizure activity over night. He remains intubated. He remains in soft wrist restraints.   His wife is at bedside and we discussed his condition. He has not tolerated spontaneous breathing trials today.   Consultants/Specialty  Neurology  Critical Care.  I discussed his condition with Dr. Randall on ICU Hot Rounds.   Cardiology  Nephrology. I discussed his condition with Dr. Rahman today.    Disposition  ICU        Review of Systems   Unable to perform ROS: Intubated      Physical Exam  Laboratory/Imaging   Hemodynamics  Temp (24hrs), Av.6 °C (97.8 °F), Min:35.6 °C (96.1 °F), Max:36.9 °C (98.4 °F)   Temperature: (!) 35.6 °C (96.1 °F), Monitored Temp: 35.6 °C (96.1 °F)  Pulse  Av  Min: 37  Max: 80 Heart Rate (Monitored): (!) 56  NIBP: 145/57     Respiratory  Gibson Vent Mode: APVCMV, Rate (breaths/min): 12, PEEP/CPAP: 8, PEEP/CPAP: 8, FiO2: 30, P Peak (PIP): 16, P MEAN: 13   Respiration: 16, Pulse Oximetry: 99 %     Work Of Breathing / Effort: Vented  RUL Breath Sounds: Clear;Diminished, RML Breath Sounds: Diminished, RLL Breath Sounds: Diminished, CATALINA Breath Sounds: Clear;Diminished, LLL Breath Sounds: Diminished    Fluids    Intake/Output Summary (Last 24 hours) at 18 0716  Last data filed at 18 0600   Gross per 24 hour   Intake            2060.5 ml   Output             2425 ml   Net           -364.5 ml       Nutrition  Orders Placed This Encounter   Procedures   • Diet NPO     Standing Status:   Standing     Number of Occurrences:   1     Order Specific Question:   Restrict to:     Answer:   Strict [1]     Physical Exam   Constitutional: No distress.   HENT:   judith paul.    Eyes: Right eye exhibits no discharge. Left eye exhibits no discharge. Scleral icterus is present.   Neck: Neck supple.   Left IJ cand right dialysis catheter   Cardiovascular:   Murmur heard.  irregular   Pulmonary/Chest:   Vent, good air movement.  No wheezing. No rhonchi.   Abdominal: Bowel sounds are normal. He exhibits distension. There is no tenderness.   Genitourinary:   Genitourinary Comments: Holland catheter.   Musculoskeletal: He exhibits no edema or tenderness.   Soft wrist restraints.   Neurological: He is alert.   Awake and alert, he follows commands.    Skin: He is not diaphoretic.   Bilateral forearm bruising.   Nursing note and vitals reviewed.      Recent Labs      04/22/18   0512 04/23/18 0457  04/23/18   1753  04/24/18   0513   WBC  8.1   --   11.5*   --   8.1   RBC  2.37*   --   2.53*   --   2.47*   HEMOGLOBIN  7.3*   < >  7.8*  7.3*  7.7*   HEMATOCRIT  21.6*   < >  23.6*  22.3*  25.3*   MCV  91.1   --   93.3   --   102.4*   MCH  30.8   --   30.8   --   31.2   MCHC  33.8   --   33.1*   --   30.4*   RDW  55.5*   --   55.4*   --   61.9*   PLATELETCT  207   --   234   --   170   MPV  10.6   --   10.5   --   11.1    < > = values in this interval not displayed.     Recent Labs      04/22/18   0512  04/23/18 0457 04/24/18   0513   SODIUM  134*  137  132*   POTASSIUM  4.2  4.0  4.2   CHLORIDE  101  101  97   CO2  23  25  25   GLUCOSE  300*  155*  218*   BUN  43*  41*  40*   CREATININE  2.54*  2.38*  2.11*   CALCIUM  8.4*  8.2*  8.0*     Recent Labs      04/24/18   0513   INR  1.07         Recent Labs      04/22/18   0512   TRIGLYCERIDE  446*           Assessment/Plan     * Encephalopathy- (present on admission)   Assessment & Plan    Patient presented with altered mental status  Outpatient MRI of the brain showed no evidence of pathology  Source is likely due to seizures.        Seizure (CMS-HCC)- (present on admission)   Assessment & Plan    Negative MRI prior to admit.  IV keppra  EEG:  DESCRIPTION OF THE RECORD:        The EEG background consists of mixed theta and delta. There are periodic epileptiform sharp over frontal ( R>L) these does not meet the criteria of electrographic seizures but highly suggestive of focal seizures from the right frontal           Shock circulatory (CMS-HCC)- (present on admission)   Assessment & Plan    S/p IV pressors.   He did not appear to be septic.        Acute on chronic kidney failure (CMS-HCC)- (present on admission)   Assessment & Plan    Patient has been started on dialysis for hyperkalemia and acute on chronic renal failure  Nephrology has been consulted.        Acute blood loss anemia- (present on admission)   Assessment & Plan    Patient had drop in his hemoglobin  Etiology unclear, he does not appear to have significant GI bleed  CT scan of the abdomen and pelvis 4/20/2018 shows no evidence of retroperitoneal hemorrhage  Hemoglobin has remained the 7 range.        Acute respiratory failure (CMS-HCC)- (present on admission)   Assessment & Plan    Patient is intubated  Vent support as per pulmonary        Diabetes mellitus type 2, controlled (CMS-HCC)- (present on admission)   Assessment & Plan    Lantus 10 units daily and sliding scale  Hold oral hypoglycemics while the patient is inpatient  HbA1c is 6.9        Bradycardia- (present on admission)   Assessment & Plan    Heart rate had gone down into the 20's and atropine was given.        Dyslipidemia- (present on admission)   Assessment & Plan    On statin therapy.  Monitor.         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

## 2018-04-24 NOTE — DISCHARGE PLANNING
Medical SW    Sw attended AM IDT Rounds.    RN reports, pt nodding appropriately, following commands, less agitated then yesterday, afebrile, off sedation, mcarthur in place, will have dialysis today, vent day 5,     Plan: Sw to assist w/ d/c planning as needed.

## 2018-04-24 NOTE — CARE PLAN
Problem: Ventilation Defect:  Goal: Ability to achieve and maintain unassisted ventilation or tolerate decreased levels of ventilator support    Intervention: Support and monitor invasive and noninvasive mechanical ventilation  Adult Ventilation Update    Total Vent Days: 5    Patient Lines/Drains/Airways Status    Active Airway     Name: Placement date: Placement time: Site: Days:    Airway Group ET Tube Oral 8.0 @ 26 04/20/18   0206   Oral   5              12 450 +8 30%    Events/Summary/Plan: Patient remains stable on vent, no changes made, will continue to monitor.

## 2018-04-24 NOTE — PROGRESS NOTES
HD treatment today per routine order.Treatment tolerated well.Net UF removed 2 L.Report given to primary Rn.

## 2018-04-24 NOTE — CARE PLAN
Problem: Safety  Goal: Will remain free from falls  Outcome: PROGRESSING AS EXPECTED    Intervention: Assess risk factors for falls  Risk factors assessed  Intervention: Implement fall precautions   04/23/18 2226   OTHER   Environmental Precautions Treaded Slipper Socks on Patient;Personal Belongings, Wastebasket, Call Bell etc. in Easy Reach;Report Given to Other Health Care Providers Regarding Fall Risk;Bed in Low Position;Communication Sign for Patients & Families;Transferred to Stronger Side;Mobility Assessed & Appropriate Sign Placed   Chair/Bed Strip Alarm Exception-Mechanically Ventilated AND on a Continuous Drip of Versed, Ativan, Propofol, or Precedex AND has a Jamin Scale of 4 (Calm & Cooperative)   Bed Alarm (Built in - for ICU ONLY) Yes - Alarm On         Problem: Infection  Goal: Will remain free from infection  Outcome: PROGRESSING AS EXPECTED    Intervention: Assess signs and symptoms of infection  S/S assessed  Intervention: Implement standard precautions and perform hand washing before and after patient contact  Hand washing performed     Intervention: Give CDC handouts for infection prevention (infection prevention/hand washing, disease specific, and device specific) and document in Education  Pt intubated/sedated    Intervention: Assess for removal of potential routes of infection, such as IV, central line, intra-arterial or urinary catheters  Routes assessed

## 2018-04-24 NOTE — CARE PLAN
Problem: Safety  Goal: Will remain free from injury  Patient remained free of injury  Intervention: Collaborate with Interdisciplinary Team for safe transfer and mobilization techniques  Worked with team to safely turn and reposition patient.

## 2018-04-25 PROBLEM — T68.XXXA HYPOTHERMIA: Status: RESOLVED | Noted: 2018-01-01 | Resolved: 2018-01-01

## 2018-04-25 PROBLEM — R00.1 BRADYCARDIA: Status: RESOLVED | Noted: 2018-01-01 | Resolved: 2018-01-01

## 2018-04-25 NOTE — PROGRESS NOTES
Hospital Medicine Progress Note, Adult, Complex               Author: Wallace Rahman Date & Time created: 4/25/2018  10:06 AM     Interval History:  77 year old with CAR and now on dialysis who came into the hospital with slurred speech, and found to have a negative MRI. 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     Seen on dialysis today. Tolerating treatment. Has been doing better, noted possible extubation soon.     Review of Systems:  Review of Systems   Unable to perform ROS: Intubated       Physical Exam:  Physical Exam   Constitutional: He appears well-developed. He appears ill. He is intubated.   Cardiovascular: Normal rate and regular rhythm.    Pulmonary/Chest: Effort normal and breath sounds normal. He is intubated.   Abdominal: Soft. Bowel sounds are normal.   Musculoskeletal: He exhibits no edema or tenderness.   Skin: Skin is warm and dry.       Labs:  Recent Labs      04/23/18   0512  04/24/18   0504  04/25/18   0443   ISTATAPH  7.531*  7.530*  7.540*   ISTATAPCO2  32.1  34.8  34.3   ISTATAPO2  125*  72  84   ISTATATCO2  28  30  30   [id number]  99  96  98   ISTATARTHCO3  26.9*  29.0*  29.4*   ISTATARTBE  4*  6*  6*   ISTATTEMP  36.9 C  36.1 C  37.2 C   ISTATFIO2  40  30  30   ISTATSPEC  Arterial  Arterial  Arterial   ISTATAPHTC  7.533*  7.544*  7.537*   [id number]  125*  68  86     Recent Labs      04/23/18   1139   CPKTOTAL  223*     Recent Labs      04/23/18   0457  04/24/18   0513  04/25/18   0530   SODIUM  137  132*  137   POTASSIUM  4.0  4.2  3.7   CHLORIDE  101  97  99   CO2  25  25  28   BUN  41*  40*  45*   CREATININE  2.38*  2.11*  2.09*   MAGNESIUM  2.1   --    --    PHOSPHORUS  3.7   --    --    CALCIUM  8.2*  8.0*  8.4*     Recent Labs      04/23/18   0457  04/23/18   1139  04/24/18   0513  04/25/18   0530   ALTSGPT  53*   --    --    --    ASTSGOT  102*   --    --    --    ALKPHOSPHAT  429*   --    --    --    TBILIRUBIN  0.7   --    --    --    GAMMAGT   --   494*    --    --    PREALBUMIN  16.0*   --    --    --    GLUCOSE  155*   --   218*  113*     Recent Labs      18   0457  18   1753  18   0513  18   0530   RBC  2.53*   --   2.47*  2.38*   HEMOGLOBIN  7.8*  7.3*  7.7*  7.2*   HEMATOCRIT  23.6*  22.3*  25.3*  22.8*   PLATELETCT  234   --   170  162*   PROTHROMBTM   --    --   13.6   --    INR   --    --   1.07   --      Recent Labs      18   0513  18   0530   WBC  11.5*  8.1  9.4   NEUTSPOLYS  82.50*  80.10*  79.60*   LYMPHOCYTES  10.30*  11.70*  12.60*   MONOCYTES  6.00  6.40  5.90   EOSINOPHILS  0.10  0.20  0.50   BASOPHILS  0.20  0.40  0.40   ASTSGOT  102*   --    --    ALTSGPT  53*   --    --    ALKPHOSPHAT  429*   --    --    TBILIRUBIN  0.7   --    --            Hemodynamics:  Temp (24hrs), Av.7 °C (99.9 °F), Min:37.1 °C (98.8 °F), Max:38.1 °C (100.6 °F)  Temperature: 37.1 °C (98.8 °F), Monitored Temp: 37.1 °C (98.8 °F)  Pulse  Av.1  Min: 37  Max: 80Heart Rate (Monitored): 66  NIBP: 117/56    Respiratory:  Gibson Vent Mode: Spont, Rate (breaths/min): 12, PEEP/CPAP: 8, FiO2: 30, P Peak (PIP): 25, P MEAN: 3 Respiration: 14, Pulse Oximetry: 99 %     Work Of Breathing / Effort: Vented  RUL Breath Sounds: Clear, RML Breath Sounds: Clear, RLL Breath Sounds: Diminished, CATALINA Breath Sounds: Clear, LLL Breath Sounds: Diminished  Fluids:    Intake/Output Summary (Last 24 hours) at 18 1006  Last data filed at 18 0600   Gross per 24 hour   Intake          1551.21 ml   Output              845 ml   Net           706.21 ml        GI/Nutrition:  Orders Placed This Encounter   Procedures   • Diet NPO     Standing Status:   Standing     Number of Occurrences:   1     Order Specific Question:   Restrict to:     Answer:   Strict [1]     Medical Decision Making, by Problem:  Active Hospital Problems    Diagnosis   • *Encephalopathy [G93.40]   • Shock circulatory (CMS-HCC) [R57.9]   • Acute respiratory failure  (CMS-[facility]) [J96.00]   • Acute blood loss anemia [D62]   • Diabetes mellitus type 2, controlled (CMS-HCC) [E11.9]   • Cerebral ataxia (CMS-HCC) [G11.9]   • Hypothermia [T68.XXXA]   • HTN (hypertension) [I10]   • Acute on chronic kidney failure (CMS-HCC) [N17.9, N18.9]   • GERD (gastroesophageal reflux disease) [K21.9]   • Dyslipidemia [E78.5]     1. CAR - HD today, hold tomorrow, monitor  2. Anemia - Epogen  3. Respiratory failure / volume overload - Improving    -HD today, hold tomorrow until assessment    Quality-Core Measures

## 2018-04-25 NOTE — CARE PLAN
Problem: Ventilation Defect:  Goal: Ability to achieve and maintain unassisted ventilation or tolerate decreased levels of ventilator support    Intervention: Support and monitor invasive and noninvasive mechanical ventilation  Adult Ventilation Update    Total Vent Days: 5  Vent:  x 12, 40% +8    Patient Lines/Drains/Airways Status    Active Airway     Name: Placement date: Placement time: Site: Days:    Airway Group ET Tube Oral 8.0 04/20/18   0206   Oral   5              In the last 24 hours, the patient tolerated SBT for 1.5 hours on settings of Spont 5/8.    Mobility Group  Activity Performed: Unable to mobilize     Events/Summary/Plan: Patient stable on vent. Tolerated SBT fair. Irregular breathing pattern. Lethargic.

## 2018-04-25 NOTE — PROGRESS NOTES
"Pulmonary Critical Care Progress Note        Date of Admission:  4/21/2018    Chief Complaint: ALOC    History of Present Illness: 77-year-old  lives in [place], [place].    Apparently yesterday, he began to have slurred   speech and some right-sided facial droop.  The wife took him to the hospital.    He had an MRI which apparently was normal.  This morning the wife noted him   to be somewhat confused and acting \"spacey.\"  He presented back to the   hospital in [place].  He was reportedly bradycardic into the 20s.  He was given   atropine with improvement in his heart rate and mentation.  He was transferred   to [facility] for subspecialty care.  After arriving at [facility], he has developed   hypoxemia.  He was somewhat combative.  He was requiring increasing amounts of   oxygen and he was intubated.  He has also been found to be hypoglycemic.     Dextrose was administered.  He also had elevated potassium and he has received   calcium.  A rectal temperature reveals that his temperature is 90 degrees and   he is now being externally warmed with a Carolynn Hugger.    ROS:  Respiratory: unable to perform due to the patient's inability to effectively communicate, Cardiac: unable to perform due to the patient's inability to effectively communicate, GI: unable to perform due to the patient's inability to effectively communicate.      Interval Events:  24 hour interval history reviewed     Vent Day #6  Follows - less agitated  DEX 1.5  HD daily  SR 60s - PVCs  -130s  Tm 100.6  TF goal  UO low - better  CXR overall better edema pattern  30% PEEP 8  RSBI 34, NIF neg 75, VC 2+liters  No secretions  Unasyn 6/7  WBC 9  Last day HC  Sugars better    Serial f/u  HD in AM tolerated  SBT in early PM - weans good  Extubated  Following    Serial f/u  ABD distended - some nausea - no vomiting  TF held - aspiration precautions  KUB pending  Simethicone requested by patient/RN - better?    D/w Dr Vásquez at length - Sz by EEG  Full Neuro consult " pending  No further Sz activity today     YESTERDAY  Vent day #5  Agitated /sedated  EEG suggest focal Sz?  Keppra started 4/22  Neuro consult still pending  HD this AM   Hemodynamics better  SR/SB - PVCs  SBp - off NE - 130s  Afeb  DEX 1.5  Following better after HD  TF goal  UO better - still low  CXR better edema/atx - CM  Azithro - last day  Zosyn day 5  SSI 10 units/24      Serial F/u - agitation better - starting to follow  DEX drip  Hemodynamics no change  Not ready for liberation      PFSH:  No change.    General:  Appears acute and chronically ill, full vent, appears older than stated age, following    Skin:  Warm and dry, no rash or lesion - no change again    Respiratory:  Gibson Vent Mode: APVCMV, Rate (breaths/min): 12, Vt Target (mL): 450, PEEP/CPAP: 8, FiO2: 30, Static Compliance (ml / cm H2O): 161, Control VTE (exp VT): 401  Pulse Oximetry: 92 %  Ventilator mechanics and waveforms are reviewed at the bedside with RT again        Exam: unlabored respirations, no intercostal retractions or accessory muscle use    Diminished BS at bases - few BB coarse rhonchi - no delta   Min secretions     ImagingCXR  I have personally reviewed the chest x-ray my impression is  noted above and films are reviewed with Team on rounds     Recent Labs      04/23/18   0512  04/24/18   0504  04/25/18   0443   ISTATAPH  7.531*  7.530*  7.540*   ISTATAPCO2  32.1  34.8  34.3   ISTATAPO2  125*  72  84   ISTATATCO2  28  30  30   [id number]  99  96  98   ISTATARTHCO3  26.9*  29.0*  29.4*   ISTATARTBE  4*  6*  6*   ISTATTEMP  36.9 C  36.1 C  37.2 C   ISTATFIO2  40  30  30   ISTATSPEC  Arterial  Arterial  Arterial   ISTATAPHTC  7.533*  7.544*  7.537*   [id number]  125*  68  86       HemoDynamics:  Pulse: 62, Heart Rate (Monitored): 66  NIBP: 132/61      Exam: regular rate and rhythm, no ectopy, sinus bradycardia, no murmur - unchanged  Imaging: Available data reviewed     ECHO 4/20  CONCLUSIONS  Technically difficult but  adequate study for interpretation.   Normal left ventricular chamber size.  Normal left ventricular systolic function.  Left ventricular ejection fraction is visually estimated to be 65%.  Difficult to comment on regional wall motion but mid inferior wall   appeared hypokinetic.  The aortic valve is not well visualized.  Moderate aortic insufficiency.  Mild aortic stenosis. Transvalvular gradients are - Peak: 29  mmHg,   Mean: 12 mmHg.  Trace mitral regurgitation.  The left atrium is normal in size.  Normal inferior vena cava size without inspiratory collapse.  The right ventricle was normal in size and function.  Compared to prior study 4/2014, inferior wall motion abnormality is now   Seen    Recent Labs      04/23/18   1139   CPKTOTAL  223*       Neuro:  GCS Total Valdemar Coma Score: 10     DEX drip    Exam: no focal deficits noted Heavily sedated at times, follows - moves all to command, CN intact, no Sz activity    Imaging: Available data reviewed     EEG 4/23:  This scalp EEG is consistent with               1. Diffuse nonspecific cortical dysfunction - moderate to severe              2. Focal cortical dysfunction and irritability over frontal -- R>L , focal seizures are likely  There are no electrographic seizures in this short record though  Advise anti-seizure medication if the patient remains confused    Fluids:  Intake/Output       04/23/18 0700 - 04/24/18 0659 04/24/18 0700 - 04/25/18 0659 04/25/18 0700 - 04/26/18 0659      2323-9920 2879-8002 Total 3440-7668 3884-9638 Total 1231-4913 7638-7864 Total       Intake    I.V.  403.9  516.6 920.5  404.1  505.5 909.6  --  -- --    Precedex Volume 88 396.6 484.6 284.1 305.5 589.6 -- -- --    Propofol Volume 97.1 -- 97.1 -- -- -- -- -- --    Norepinephrine Volume 18.8 -- 18.8 -- -- -- -- -- --    IV Piggyback Volume (IV Piggyback) 200 120 320 120 200 320 -- -- --    Other  60  60 120  30  -- 30  --  -- --    Medications (P.O./ Enteral Liquids) 60 60 120 30 -- 30  -- -- --    Enteral  450  570 1020  740  230 970  --  -- --    Enteral Volume 400 480 880 680 140 820 -- -- --    Free Water / Tube Flush 50 90 140 60 90 150 -- -- --    Total Intake 913.9 1146.6 2060.5 1174.1 735.5 1909.6 -- -- --       Output    Urine  375  550 925  255  -- 255  --  -- --    Indwelling Cathether 375 550 925 255 -- 255 -- -- --    Dialysis  1500  -- 1500  2000  --   --  -- --    Dialysis Output (Dialysis Output) 1500 -- 1500 2000 --  -- -- --    Stool  --  -- --  --  -- --  --  -- --    Number of Times Stooled -- 0 x 0 x -- 1 x 1 x -- -- --    Total Output 0025 043 3648 2255 -- 2255 -- -- --       Net I/O     -961.1 596.6 -364.5 -1080.9 735.5 -345.4 -- -- --        Weight: 88.7 kg (195 lb 8.8 oz)  Recent Labs      18   0513   SODIUM  137  132*   POTASSIUM  4.0  4.2   CHLORIDE  101  97   CO2  25  25   BUN  41*  40*   CREATININE  2.38*  2.11*   MAGNESIUM  2.1   --    PHOSPHORUS  3.7   --    CALCIUM  8.2*  8.0*       GI/Nutrition:  Exam: abdomen is soft and non-tender, normal active bowel sounds, no CVAT    Later in day - distended, NT/no guarding, no CVAT/+BS    Imaging: Available data reviewed     KUB pending  - ordered afternoon     NPO and tube feed Tolerated - cortrak    TF on hold afternoon     Liver Function  Recent Labs      187  18   1139  18   0513   ALTSGPT  53*   --    --    ASTSGOT  102*   --    --    ALKPHOSPHAT  429*   --    --    TBILIRUBIN  0.7   --    --    GAMMAGT   --   494*   --    PREALBUMIN  16.0*   --    --    GLUCOSE  155*   --   218*       Heme:  Recent Labs      18/18   1753  18   0513   RBC  2.53*   --   2.47*   HEMOGLOBIN  7.8*  7.3*  7.7*   HEMATOCRIT  23.6*  22.3*  25.3*   PLATELETCT  234   --   170   PROTHROMBTM   --    --   13.6   INR   --    --   1.07       Infectious Disease:  Monitored Temp 2  Av °C (98.6 °F)  Min: 35.3 °C (95.5 °F)  Max: 38.1 °C (100.6 °F)  Temp  Av.6  °C (99.7 °F)  Min: 35.6 °C (96.1 °F)  Max: 38.1 °C (100.6 °F)  Micro: reviewed  Recent Labs      04/23/18   0457  04/24/18 0513   WBC  11.5*  8.1   NEUTSPOLYS  82.50*  80.10*   LYMPHOCYTES  10.30*  11.70*   MONOCYTES  6.00  6.40   EOSINOPHILS  0.10  0.20   BASOPHILS  0.20  0.40   ASTSGOT  102*   --    ALTSGPT  53*   --    ALKPHOSPHAT  429*   --    TBILIRUBIN  0.7   --      Current Facility-Administered Medications   Medication Dose Frequency Provider Last Rate Last Dose   • heparin injection 5,000 Units  5,000 Units Q8HRS Domingo Randall M.D.   5,000 Units at 04/24/18 2032   • hydrocortisone sodium succinate PF (SOLU-CORTEF) 100 MG injection 25 mg  25 mg Q8HRS Domingo Randall M.D.   25 mg at 04/24/18 2032   • famotidine (PEPCID) tablet 20 mg  20 mg DAILY Domingo Randall M.D.   20 mg at 04/24/18 0906   • dexmedetomidine (PRECEDEX) 400 mcg in D5W 100 mL infusion  0.1-1.5 mcg/kg/hr Continuous Domingo Randall M.D. 34.2 mL/hr at 04/25/18 0535 1.5 mcg/kg/hr at 04/25/18 0535   • epoetin norah (EPOGEN,PROCRIT) injection 2,000 Units  2,000 Units MO, WE + FR Wallace Rahman M.D.        And   • epoetin norah (EPOGEN,PROCRIT) injection 3,000 Units  3,000 Units MO, WE + FR Wallace Rahman M.D.       • LORazepam (ATIVAN) injection 1-2 mg  1-2 mg Q HOUR PRN Manuel Lombardo M.D.       • levETIRAcetam (KEPPRA) 1,000 mg in  mL IVPB  1,000 mg Q12HRS Manuel Lombardo M.D.   Stopped at 04/24/18 2327   • heparin injection 3,000 Units  3,000 Units DIALYSIS PRN Fadi Najjar, M.D.   3,000 Units at 04/24/18 0850   • insulin glargine (LANTUS) injection 10 Units  10 Units Q EVENING Stephon Trejo M.D.   10 Units at 04/24/18 2056   • ampicillin/sulbactam (UNASYN) 3 g in  mL IVPB  3 g Q24HRS Bryan Meza M.D.   Stopped at 04/24/18 1455   • lidocaine (XYLOCAINE) 1 % solution  1 mL PRN Fadi Najjar, M.D.       • Respiratory Care per Protocol   Continuous RT Manuel Lombardo M.D.       •  chlorhexidine (PERIDEX) 0.12 % solution 15 mL  15 mL BID Manuel Lombardo M.D.   15 mL at 04/24/18 2032   • fentaNYL (SUBLIMAZE) injection 25 mcg  25 mcg Q HOUR PRN Manuel Lombardo M.D.        Or   • fentaNYL (SUBLIMAZE) injection 50 mcg  50 mcg Q HOUR PRN Manuel Lombardo M.D.        Or   • fentaNYL (SUBLIMAZE) injection 100 mcg  100 mcg Q HOUR PRN Manuel Lombardo M.D.   100 mcg at 04/25/18 0521   • ipratropium-albuterol (DUONEB) nebulizer solution 3 mL  3 mL Q2HRS PRN (RT) Manuel Lombardo M.D.       • ipratropium-albuterol (DUONEB) nebulizer solution 3 mL  3 mL Q4HRS (RT) Manuel Lombardo M.D.   3 mL at 04/25/18 0300   • insulin regular (HUMULIN R) injection 2-9 Units  2-9 Units Q6HRS Manuel Lombardo M.D.   Stopped at 04/25/18 0000    And   • glucose 4 g chewable tablet 16 g  16 g Q15 MIN PRN Manuel Lombardo M.D.        And   • dextrose 50% (D50W) injection 25 mL  25 mL Q15 MIN PRN Manuel Lombardo M.D.       • ondansetron (ZOFRAN) syringe/vial injection 4 mg  4 mg Q4HRS PRN Hernan Dallas M.D.       • propofol (DIPRIVAN) injection  0-80 mcg/kg/min Continuous Manuel Lombardo M.D.   Stopped at 04/23/18 1342   • Pharmacy Consult: Enteral tube feeding - review meds/change route/product selection  1 Each PRN Bryan Meza M.D.       • senna-docusate (PERICOLACE or SENOKOT S) 8.6-50 MG per tablet 2 Tab  2 Tab BID Stephon Trejo M.D.   Stopped at 04/24/18 2100    And   • polyethylene glycol/lytes (MIRALAX) PACKET 1 Packet  1 Packet QDAY PRN Stephon E Neil, M.D.        And   • magnesium hydroxide (MILK OF MAGNESIA) suspension 30 mL  30 mL QDAY PRN Stephon Trejo M.D.        And   • bisacodyl (DULCOLAX) suppository 10 mg  10 mg QDAY PRN Stephon Trejo M.D.       • acetaminophen (TYLENOL) tablet 650 mg  650 mg Q6HRS PRN Stephon Trejo M.D.   650 mg at 04/20/18 2123   • ondansetron (ZOFRAN ODT) dispertab 4 mg  4 mg Q4HRS PRN Stephon Trejo M.D.       •  norepinephrine (LEVOPHED) 8 mg in  mL Infusion  0-30 mcg/min Continuous Stephon Trejo M.D.   Stopped at 04/23/18 0918     Last reviewed on 4/20/2018 12:11 PM by Alley Wolfe [facility]    Quality  Measures:   Labs reviewed, Medications reviewed and Radiology images reviewed                      Problems:  Acute hypoxemic respiratory failure   - intubated 4/20   - full vent support; vent settings adjusted today again   - not appropriate for liberation   - serial ABG/CXR/vent mechanics review   - A- F bundle   - Mobilize/sedation vacations as tolerated   - wean Propofol - swap to DEX?  Acute unspecified encephalopathy   - CT scan of the head in [place] is negative    - MRI negative for acute pathology   - ? Toxic/metabolic; w/u in progress, r/o sz   - serial lab/exam - limit sedation   - KEPPRA started 4/22   - EEG suggest focal Sz 4/23   - Neuro eval pending   - f/u EEG - cont vEEG if persisting issue   - LOC/agitation better! 4/24  Hypotension, unspecified   - IVF, titrated vasopressors   - wean HC when off NE 24 hrs   - w/u as above  Query pneumonia  Hypothermia.  Acute blood loss anemia   - ? Source, doubt hemolysis, no evidence of GI loss   - CT  abdomen and pelvis - no retroperitoneal hemorrhage or ischemic bowel   - serial H/H - transfuse PRN  Acute kidney injury, on CKD?   - started daily RRT   - HD catheter   - nephro following   - Renally dose meds - avoid nephrotoxins  Elevated LFTs   - trend - etiology?  Hyperkalemia   - s/p Calcium, bicarb    - RRT  Hyponatremia  Elevated lipase  Positive troponin   - suspect type 2/demand   - no evidence of ACS, trend  Hypoglycemia.  Diabetes mellitus type 2   - glycemic control   - 4/20 ECHO noted  History of systemic arterial hypertension.  Gastroesophageal reflux disease.  Dyslipidemia.  Chronic back pain.  Heparin prophylaxis, PPI to pepcid    Extuabtion trial  Continue Keppra - monitor for Sz  Neuro eval pending still - D/w Dr Vásquez  Wean DEX off with extuabtion  Wean  HC - off pressors  Mobilize as tolerated    NPO  KUB  Asp precautions  Simethicone/Zofran  Mobilize    Updated wife at bedside x 2    Discussed patient condition and risk of morbidity and/or mortality with Family, RN, RT, Pharmacy, Dietary, , Charge nurse / hot rounds, Patient and hospitalist and neurology

## 2018-04-25 NOTE — FLOWSHEET NOTE
04/25/18 0812   Events/Summary/Plan   Events/Summary/Plan weaning parameters   Weaning Parameters   RR (bpm) 16   #FVC / Vital Capacity (liters)  1.88   NIF (cm H2O)  -75   Rapid Shallow Breathing Index (RR/VT) 34   Spontaneous VE 7.3   Spontaneous VT 0.47

## 2018-04-25 NOTE — PROGRESS NOTES
HD treatment today per routine order.Treatment tolerated well.Net UF removed 1500 ml.Report given to primary Rn.

## 2018-04-25 NOTE — PROGRESS NOTES
[facility] Hospitalist Progress Note    Date of Service: 2018    Chief Complaint  77 y.o. male admitted 2018 with altered mental status.    Interval Problem Update  Mr. Connor has a history of diabetes and hypertension that presented to the ER in [place], [place] with slurred speech and right facial droop. He developed confusion and bradycardia. He had an MRI of the brain which was negative for acute processes. He was transferred to [facility] and has required intubation due to inability to protect his airway.   An EEG was done on  which is suggestive of seizure activity and he is on IV Keppra.  He is tolerating cortrak feeding.  He is on precedex at 1.5. RN notes no seizure activity over night. He remains intubated. He remains in soft wrist restraints which were signed.   His wife is at bedside and we discussed his condition. He tolerated spontaneous breathing trials today and was extubated.  He will not have dialysis today. RN notes significant ectopy on the monitor.   Consultants/Specialty  Neurology  Critical Care.  I discussed his condition with Dr. Randall on ICU Hot Rounds.   Cardiology  Nephrology. I discussed his condition with Dr. Rahman today.    Disposition  ICU        Review of Systems   Unable to perform ROS: Mental acuity      Physical Exam  Laboratory/Imaging   Hemodynamics  Temp (24hrs), Av.7 °C (99.9 °F), Min:37.1 °C (98.8 °F), Max:38.1 °C (100.6 °F)   Temperature: 37.1 °C (98.8 °F), Monitored Temp: 37.1 °C (98.8 °F)  Pulse  Av.1  Min: 37  Max: 80 Heart Rate (Monitored): (!) 58  NIBP: 117/56     Respiratory  Gibson Vent Mode: APVCMV, Rate (breaths/min): 12, PEEP/CPAP: 8, PEEP/CPAP: 8, FiO2: 30, P Peak (PIP): 12, P MEAN: 11   Respiration: 14, Pulse Oximetry: 95 %     Work Of Breathing / Effort: Vented  RUL Breath Sounds: Clear, RML Breath Sounds: Clear, RLL Breath Sounds: Diminished, CATALINA Breath Sounds: Clear, LLL Breath Sounds: Diminished    Fluids    Intake/Output Summary (Last 24  hours) at 04/25/18 0749  Last data filed at 04/25/18 0600   Gross per 24 hour   Intake          1978.01 ml   Output             2955 ml   Net          -976.99 ml       Nutrition  Orders Placed This Encounter   Procedures   • Diet NPO     Standing Status:   Standing     Number of Occurrences:   1     Order Specific Question:   Restrict to:     Answer:   Strict [1]     Physical Exam   Constitutional: No distress.   HENT:   cortrak nares.    Eyes: Right eye exhibits no discharge. Left eye exhibits no discharge. Scleral icterus is present.   Neck: Neck supple.   Left IJ cand right dialysis catheter   Cardiovascular:   Murmur heard.  irregular   Pulmonary/Chest:   moderate air movement.  No wheezing. Few crackles.    Abdominal: Bowel sounds are normal. He exhibits distension. There is no tenderness.   Genitourinary:   Genitourinary Comments: Holland catheter.   Musculoskeletal: He exhibits no edema or tenderness.   Soft wrist restraints.   Neurological: He is alert.   Sleepy, he follows commands.    Skin: He is not diaphoretic.   Bilateral forearm bruising.   Nursing note and vitals reviewed.      Recent Labs      04/23/18   0457  04/23/18   1753  04/24/18   0513  04/25/18   0530   WBC  11.5*   --   8.1  9.4   RBC  2.53*   --   2.47*  2.38*   HEMOGLOBIN  7.8*  7.3*  7.7*  7.2*   HEMATOCRIT  23.6*  22.3*  25.3*  22.8*   MCV  93.3   --   102.4*  95.8   MCH  30.8   --   31.2  30.3   MCHC  33.1*   --   30.4*  31.6*   RDW  55.4*   --   61.9*  54.9*   PLATELETCT  234   --   170  162*   MPV  10.5   --   11.1  10.7     Recent Labs      04/23/18   0457  04/24/18   0513  04/25/18   0530   SODIUM  137  132*  137   POTASSIUM  4.0  4.2  3.7   CHLORIDE  101  97  99   CO2  25 25  28   GLUCOSE  155*  218*  113*   BUN  41*  40*  45*   CREATININE  2.38*  2.11*  2.09*   CALCIUM  8.2*  8.0*  8.4*     Recent Labs      04/24/18   0513   INR  1.07         Recent Labs      04/25/18   0530   TRIGLYCERIDE  145          Assessment/Plan     *  Encephalopathy- (present on admission)   Assessment & Plan    Patient presented with altered mental status  Outpatient MRI of the brain showed no evidence of pathology  Source is likely due to seizures.        Seizure (CMS-HCC)- (present on admission)   Assessment & Plan    Negative MRI prior to admit.  IV keppra change to oral  neurology  EEG:  DESCRIPTION OF THE RECORD:        The EEG background consists of mixed theta and delta. There are periodic epileptiform sharp over frontal ( R>L) these does not meet the criteria of electrographic seizures but highly suggestive of focal seizures from the right frontal           Shock circulatory (CMS-HCC)- (present on admission)   Assessment & Plan    S/p IV pressors.   He did not appear to be septic.        Acute on chronic kidney failure (CMS-HCC)- (present on admission)   Assessment & Plan    Patient has been started on dialysis for hyperkalemia and acute on chronic renal failure  Nephrology has been consulted.        Acute blood loss anemia- (present on admission)   Assessment & Plan    Patient had drop in his hemoglobin  Etiology unclear, he does not appear to have significant GI bleed  CT scan of the abdomen and pelvis 4/20/2018 shows no evidence of retroperitoneal hemorrhage  Hemoglobin has remained the 7 range.        Acute respiratory failure (CMS-HCC)- (present on admission)   Assessment & Plan    Extubated on 4/25.  Pulmonology consulted.         Diabetes mellitus type 2, controlled (CMS-HCC)- (present on admission)   Assessment & Plan    Lantus 10 units daily and sliding scale  Hold oral hypoglycemics while the patient is inpatient  HbA1c is 6.9        Dyslipidemia- (present on admission)   Assessment & Plan    On statin therapy.  Monitor.         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

## 2018-04-25 NOTE — CARE PLAN
Problem: Safety  Goal: Will remain free from falls  Outcome: PROGRESSING AS EXPECTED    Intervention: Assess risk factors for falls  Risk factors assessed  Intervention: Implement fall precautions   04/24/18 8493   OTHER   Environmental Precautions Personal Belongings, Wastebasket, Call Bell etc. in Easy Reach;Treaded Slipper Socks on Patient;Report Given to Other Health Care Providers Regarding Fall Risk;Mobility Assessed & Appropriate Sign Placed;Bed in Low Position;Communication Sign for Patients & Families;Transferred to [facility]   Chair/Bed Strip Alarm Exception-Mechanically Ventilated AND on a Continuous Drip of Versed, Ativan, Propofol, or Precedex AND has a Jamin Scale of 4 (Calm & Cooperative)   Bed Alarm (Built in - for ICU ONLY) Yes - Alarm On         Problem: Infection  Goal: Will remain free from infection  Outcome: PROGRESSING AS EXPECTED    Intervention: Assess signs and symptoms of infection  S/S assessed  Intervention: Implement standard precautions and perform hand washing before and after patient contact  Hand washing performed   Intervention: Give CDC handouts for infection prevention (infection prevention/hand washing, disease specific, and device specific) and document in Education  Pt intubated/sedated    Intervention: Assess for removal of potential routes of infection, such as IV, central line, intra-arterial or urinary catheters  Routes assessed

## 2018-04-25 NOTE — CARE PLAN
Problem: Ventilation Defect:  Goal: Ability to achieve and maintain unassisted ventilation or tolerate decreased levels of ventilator support  Outcome: MET Date Met: 04/25/18

## 2018-04-26 NOTE — PROGRESS NOTES
Pt was having abdomen pain at 8 or above. Dr meléndez notified order for morphine 2-4 mg Q4 received.

## 2018-04-26 NOTE — THERAPY
"Physical Therapy Evaluation completed.   Bed Mobility:  Supine to Sit: Moderate Assist  Transfers: Sit to Stand: Minimal Assist  Gait: Level Of Assist: Maximal Assist with hha x2      Plan of Care: Will benefit from Physical Therapy 3 times per week  Discharge Recommendations: Equipment: Will Continue to Assess for Equipment Needs.    See \"Rehab Therapy-Acute\" Patient Summary Report for complete documentation.     "

## 2018-04-26 NOTE — PROGRESS NOTES
"Pulmonary Critical Care Progress Note        Date of Admission:  4/21/2018    Chief Complaint: ALOC    History of Present Illness: 77-year-old  lives in [place], [place].    Apparently yesterday, he began to have slurred   speech and some right-sided facial droop.  The wife took him to the hospital.    He had an MRI which apparently was normal.  This morning the wife noted him   to be somewhat confused and acting \"spacey.\"  He presented back to the   hospital in [place].  He was reportedly bradycardic into the 20s.  He was given   atropine with improvement in his heart rate and mentation.  He was transferred   to [facility] for subspecialty care.  After arriving at [facility], he has developed   hypoxemia.  He was somewhat combative.  He was requiring increasing amounts of   oxygen and he was intubated.  He has also been found to be hypoglycemic.     Dextrose was administered.  He also had elevated potassium and he has received   calcium.  A rectal temperature reveals that his temperature is 90 degrees and   he is now being externally warmed with a Carolynn Hugger.    Review of Systems   Constitutional: Negative for chills, diaphoresis and fever.   HENT: Negative for congestion and nosebleeds.    Eyes: Negative.    Respiratory: Positive for shortness of breath. Negative for cough and sputum production.    Cardiovascular: Negative for chest pain and palpitations.   Gastrointestinal: Positive for nausea (better after NG to suction placed). Negative for abdominal pain, diarrhea and vomiting.   Genitourinary: Negative.    Musculoskeletal: Negative.    Skin: Negative.    Neurological: Negative for sensory change, speech change, focal weakness and headaches.   Endo/Heme/Allergies: Negative.    Psychiatric/Behavioral: The patient has insomnia. The patient is not nervous/anxious.        Interval Events:  24 hour interval history reviewed     Extubated yesterday  HF NC O2 started last night -> sats great at 100% this AM  Ileus late " yesterday  Better overnight with NG placed - 500cc out during day - NG kept in place  TF on hold  DEX weaned off  Follows well - A&O x4  Hemodynamics reviewed  Tm 100.6  No new Sz  HD per renal  Hct better - no bleeding  Renal function no delta  Transaminases better  - Alk-phos worse - Bili normal  Unasyn  Sugars reviewed    Updated wife  Neuro consult pending     YESTERDAY  Vent Day #6  Follows - less agitated  DEX 1.5  HD daily  SR 60s - PVCs  -130s  Tm 100.6  TF goal  UO low - better  CXR overall better edema pattern  30% PEEP 8  RSBI 34, NIF neg 75, VC 2+liters  No secretions  Unasyn 6/7  WBC 9  Last day HC  Sugars better    Serial f/u  HD in AM tolerated  SBT in early PM - weans good  Extubated  Following    Serial f/u  ABD distended - some nausea - no vomiting  TF held - aspiration precautions  KUB pending  Simethicone requested by patient/RN - better?    D/w Dr Vásquez at length - Sz by EEG  Full Neuro consult pending  No further Sz activity today     YESTERDAY  Vent day #5  Agitated /sedated  EEG suggest focal Sz?  Keppra started 4/22  Neuro consult still pending  HD this AM   Hemodynamics better  SR/SB - PVCs  SBp - off NE - 130s  Afeb  DEX 1.5  Following better after HD  TF goal  UO better - still low  CXR better edema/atx - CM  Azithro - last day  Zosyn day 5  SSI 10 units/24      Serial F/u - agitation better - starting to follow  DEX drip  Hemodynamics no change  Not ready for liberation      PFSH:  No change.    General:  Appears acute and chronically ill, NAD on NC O2, appears older than stated age, following    Skin:  Warm and dry, no rash or lesion - no change again    Respiratory:     Pulse Oximetry: 100 %  NC O2        Exam: unlabored respirations, no intercostal retractions or accessory muscle use    Diminished BS at bases - few BB coarse rhonchi - no delta   Min cough    ImagingCXR  I have personally reviewed the chest x-ray my impression is  noted above and films are reviewed with Team on  rounds     Recent Labs      04/24/18   0504  04/25/18   0443   ISTATAPH  7.530*  7.540*   ISTATAPCO2  34.8  34.3   ISTATAPO2  72  84   ISTATATCO2  30  30   [id number]  96  98   ISTATARTHCO3  29.0*  29.4*   ISTATARTBE  6*  6*   ISTATTEMP  36.1 C  37.2 C   ISTATFIO2  30  30   ISTATSPEC  Arterial  Arterial   ISTATAPHTC  7.544*  7.537*   [id number]  68  86       HemoDynamics:  Pulse: 64, Heart Rate (Monitored): 71  NIBP: 148/64      Exam: regular rate and rhythm, no ectopy, sinus bradycardia, no murmur - unchanged again  Imaging: Available data reviewed     ECHO 4/20  CONCLUSIONS  Technically difficult but adequate study for interpretation.   Normal left ventricular chamber size.  Normal left ventricular systolic function.  Left ventricular ejection fraction is visually estimated to be 65%.  Difficult to comment on regional wall motion but mid inferior wall   appeared hypokinetic.  The aortic valve is not well visualized.  Moderate aortic insufficiency.  Mild aortic stenosis. Transvalvular gradients are - Peak: 29  mmHg,   Mean: 12 mmHg.  Trace mitral regurgitation.  The left atrium is normal in size.  Normal inferior vena cava size without inspiratory collapse.  The right ventricle was normal in size and function.  Compared to prior study 4/2014, inferior wall motion abnormality is now   Seen    Recent Labs      04/23/18   1139   CPKTOTAL  223*       Neuro:  GCS Total [place] Coma Score: 15     DEX drip off 4/25    Exam: no focal deficits noted at times, follows well - moves all to command, CN intact, no Sz activity    Imaging: Available data reviewed     EEG 4/23:  This scalp EEG is consistent with               1. Diffuse nonspecific cortical dysfunction - moderate to severe              2. Focal cortical dysfunction and irritability over frontal -- R>L , focal seizures are likely  There are no electrographic seizures in this short record though  Advise anti-seizure medication if the patient remains  confused    Fluids:  Intake/Output       04/24/18 0700 - 04/25/18 0659 04/25/18 0700 - 04/26/18 0659 04/26/18 0700 - 04/27/18 0659      0700-1859 1900-0659 Total 0700-1859 1900-0659 Total 0700-1859 1900-0659 Total       Intake    I.V.  404.1  573.9 978  136.8  -- 136.8  --  -- --    Precedex Volume 284.1 373.9 658 136.8 -- 136.8 -- -- --    IV Piggyback Volume (IV Piggyback) 120 200 320 -- -- -- -- -- --    Other  30  -- 30  --  -- --  --  -- --    Medications (P.O./ Enteral Liquids) 30 -- 30 -- -- -- -- -- --    Enteral  740  230 970  840  -- 840  --  -- --    Enteral Volume 680 140 820 -- -- -- -- -- --    Free Water / Tube Flush 60 90 150 -- -- -- -- -- --    Intake (mL) -- -- -- 840 -- 840 -- -- --    Total Intake 1174.1 803.9 1978 976.8 -- 976.8 -- -- --       Output    Urine  255  700 955  460  440 900  --  -- --    Indwelling Cathether 255 700 955 460 440 900 -- -- --    Drains  --  -- --  0  -- 0  --  -- --    Residual Amount (ml) (Discarded) -- -- -- 0 -- 0 -- -- --    Dialysis  2000 -- 2000  1500  -- 1500  --  -- --    Dialysis Output (Dialysis Output) 2000 -- 2000 1500 -- 1500 -- -- --    Stool  --  -- --  --  -- --  --  -- --    Number of Times Stooled -- 1 x 1 x -- -- -- -- -- --    Total Output 2255 700 2955 2270 288 0530 -- -- --       Net I/O     -1080.9 103.9 -977 -983.2 -440 -1423.2 -- -- --           Recent Labs      04/24/18   0513  04/25/18   0530  04/26/18   0400   SODIUM  132*  137  142   POTASSIUM  4.2  3.7  3.6   CHLORIDE  97  99  101   CO2  25  28  33   BUN  40*  45*  37*   CREATININE  2.11*  2.09*  2.10*   CALCIUM  8.0*  8.4*  8.5       GI/Nutrition:  Exam: abdomen is soft and non-tender, normal active bowel sounds, no CVAT    Less distended, NT/no guarding, no CVAT/+BS    Imaging: Available data reviewed     KUB 4/25 ileus    NPO - cortrak with TF on Hold - NG to suction    TF on hold afternoon 4/25    Liver Function  Recent Labs      04/23/18   1139  04/24/18   0513  04/25/18   0530   18   0400   ALTSGPT   --    --    --   52*   ASTSGOT   --    --    --   62*   ALKPHOSPHAT   --    --    --   589*   TBILIRUBIN   --    --    --   0.7   GAMMAGT  494*   --    --    --    GLUCOSE   --   218*  113*  84       Heme:  Recent Labs      18   0513  18   0530  18   0400   RBC  2.47*  2.38*  2.52*   HEMOGLOBIN  7.7*  7.2*  7.9*   HEMATOCRIT  25.3*  22.8*  24.5*   PLATELETCT  170  162*  196   PROTHROMBTM  13.6   --    --    INR  1.07   --    --        Infectious Disease:  Monitored Temp 2  Av.6 °C (99.7 °F)  Min: 37.2 °C (99 °F)  Max: 38.1 °C (100.6 °F)  Temp  Av.6 °C (99.7 °F)  Min: 37.4 °C (99.3 °F)  Max: 37.9 °C (100.2 °F)  Micro: reviewed  Recent Labs      18   0518   0530  18   0400   WBC  8.1  9.4  11.2*   NEUTSPOLYS  80.10*  79.60*  79.10*   LYMPHOCYTES  11.70*  12.60*  12.50*   MONOCYTES  6.40  5.90  5.20   EOSINOPHILS  0.20  0.50  2.10   BASOPHILS  0.40  0.40  0.20   ASTSGOT   --    --   62*   ALTSGPT   --    --   52*   ALKPHOSPHAT   --    --   589*   TBILIRUBIN   --    --   0.7     Current Facility-Administered Medications   Medication Dose Frequency Provider Last Rate Last Dose   • levETIRAcetam (KEPPRA) 100 MG/ML solution 1,000 mg  1,000 mg Q12HRS Manpreet Gupta M.D.   1,000 mg at 18   • mag hydrox-al hydrox-simeth (MAALOX PLUS ES or MYLANTA DS) suspension 10 mL  10 mL 4X/DAY PRN Domingo Randall M.D.   10 mL at 18 0341   • morphine (pf) 4 mg/ml injection 2-4 mg  2-4 mg Q4HRS PRN Umer Martins Jr., D.O.   2 mg at 18 0023   • heparin injection 5,000 Units  5,000 Units Q8HRS Domingo Randall M.D.   5,000 Units at 18 0527   • famotidine (PEPCID) tablet 20 mg  20 mg DAILY Domingo Randall M.D.   20 mg at 18 0856   • dexmedetomidine (PRECEDEX) 400 mcg in D5W 100 mL infusion  0.1-1.5 mcg/kg/hr Continuous Domingo Randall M.D.   Stopped at 18 1646   • epoetin norah (EPOGEN,PROCRIT) injection 2,000  Units  2,000 Units MO, WE + FR Wallace Rahman M.D.   2,000 Units at 04/25/18 0857    And   • epoetin norah (EPOGEN,PROCRIT) injection 3,000 Units  3,000 Units MO, WE + FR Wallace Rahman M.D.   3,000 Units at 04/25/18 0857   • LORazepam (ATIVAN) injection 1-2 mg  1-2 mg Q HOUR PRN Manuel Gaffney M.D.       • heparin injection 3,000 Units  3,000 Units DIALYSIS PRN Fadi Najjar, M.D.   3,000 Units at 04/25/18 1225   • ampicillin/sulbactam (UNASYN) 3 g in  mL IVPB  3 g Q24HRS Bryan Meza M.D.   Stopped at 04/25/18 1532   • lidocaine (XYLOCAINE) 1 % solution  1 mL PRN Fadi Najjar, M.D.       • Respiratory Care per Protocol   Continuous RT Manuel Gaffney M.D.       • chlorhexidine (PERIDEX) 0.12 % solution 15 mL  15 mL BID Manuel Gaffney M.D.   15 mL at 04/25/18 2012   • fentaNYL (SUBLIMAZE) injection 25 mcg  25 mcg Q HOUR PRN Manuel Gaffney M.D.        Or   • fentaNYL (SUBLIMAZE) injection 50 mcg  50 mcg Q HOUR PRN Manuel Gaffney M.D.   50 mcg at 04/25/18 2301    Or   • fentaNYL (SUBLIMAZE) injection 100 mcg  100 mcg Q HOUR PRN Manuel Gaffney M.D.   100 mcg at 04/25/18 0521   • insulin regular (HUMULIN R) injection 2-9 Units  2-9 Units Q6HRS Manuel Gaffney M.D.   Stopped at 04/26/18 0000    And   • glucose 4 g chewable tablet 16 g  16 g Q15 MIN PRN Manuel Gaffney M.D.        And   • dextrose 50% (D50W) injection 25 mL  25 mL Q15 MIN PRN Manuel Gaffney M.D.       • ondansetron (ZOFRAN) syringe/vial injection 4 mg  4 mg Q4HRS PRN Hernan Dallas M.D.   4 mg at 04/25/18 2012   • propofol (DIPRIVAN) injection  0-80 mcg/kg/min Continuous Manuel Gaffney M.D.   Stopped at 04/23/18 1342   • Pharmacy Consult: Enteral tube feeding - review meds/change route/product selection  1 Each PRN Bryan Meza M.D.       • senna-docusate (PERICOLACE or SENOKOT S) 8.6-50 MG per tablet 2 Tab  2 Tab BID Stephon Trejo M.D.   2 Tab at 04/25/18 2012     And   • polyethylene glycol/lytes (MIRALAX) PACKET 1 Packet  1 Packet QDAY PRN Stephon Trejo M.D.   1 Packet at 04/25/18 2116    And   • magnesium hydroxide (MILK OF MAGNESIA) suspension 30 mL  30 mL QDAY PRN Stephon Trejo M.D.        And   • bisacodyl (DULCOLAX) suppository 10 mg  10 mg QDAY PRN Stephon Trejo M.D.       • acetaminophen (TYLENOL) tablet 650 mg  650 mg Q6HRS PRN Stephon Trejo M.D.   650 mg at 04/25/18 2239   • ondansetron (ZOFRAN ODT) dispertab 4 mg  4 mg Q4HRS PRN Stephon Trejo M.D.       • norepinephrine (LEVOPHED) 8 mg in  mL Infusion  0-30 mcg/min Continuous Stephon Trejo M.D.   Stopped at 04/23/18 0918     Last reviewed on 4/25/2018  6:15 PM by Aruna Hagen    Quality  Measures:  Labs reviewed, Medications reviewed and Radiology images reviewed                      Problems:  Acute hypoxemic respiratory failure   - intubated 4/20   - full vent support; vent settings adjusted today again   - not appropriate for liberation   - serial ABG/CXR/vent mechanics review   - A- F bundle   - Mobilize/sedation vacations as tolerated   - wean Propofol - swap to DEX?  Ileus   - better with NG to suction   - may need to swap to IVmeds   - Hopefully pull NG tomorrow   - Mobilize/limit narcotics   - motility agents?  Acute unspecified encephalopathy - better - Sz?   - CT scan of the head in [place] is negative    - MRI negative for acute pathology   - ? Toxic/metabolic; w/u in progress, r/o sz   - serial lab/exam - limit sedation   - KEPPRA started 4/22   - EEG suggest focal Sz 4/23   - Neuro eval pending   - f/u EEG - cont vEEG if persisting issue   - LOC/agitation better! 4/24  Hypotension, unspecified - resolved   - IVF, titrated vasopressors   - wean HC when off NE 24 hrs   - w/u as above  Query pneumonia - s/p 7 days Unasyn  Hypothermia - resolved  Acute blood loss anemia   - ? Source, doubt hemolysis, no evidence of GI loss   - CT  abdomen and pelvis - no retroperitoneal hemorrhage or ischemic  bowel   - serial H/H - transfuse PRN  Acute kidney injury, on CKD?   - started daily RRT   - HD catheter   - nephro following   - Renally dose meds - avoid nephrotoxins  Elevated LFTs   - trend - min delta/mild - etiology?  Hyperkalemia   - s/p Calcium, bicarb    - RRT  Hyponatremia  Elevated lipase  Positive troponin   - suspect type 2/demand   - no evidence of ACS, trend  Hypoglycemia.  Diabetes mellitus type 2   - glycemic control   - 4/20 ECHO noted  History of systemic arterial hypertension.  Gastroesophageal reflux disease.  Dyslipidemia.  Chronic back pain.  Heparin prophylaxis, PPI to pepcid    Keep NG to suction overnight  Mobilize as tolerated  D/w Neuro  Updated wife at bedside again    Discussed patient condition and risk of morbidity and/or mortality with Family, RN, RT, Pharmacy, Dietary, , Charge nurse / hot rounds, Patient and hospitalist and neurology

## 2018-04-26 NOTE — CARE PLAN
Problem: Respiratory:  Goal: Respiratory status will improve    Intervention: Administer and titrate oxygen therapy  Administered high flow oxy mask in response to increased need for oxygen.

## 2018-04-26 NOTE — PROGRESS NOTES
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     Extubated. Holding dialysis and monitoring.      Review of Systems:  Review of Systems   Unable to perform ROS: Intubated       Physical Exam:  Physical Exam   Constitutional: He appears well-developed. He appears ill. He is intubated.   Cardiovascular: Normal rate and regular rhythm.    Pulmonary/Chest: Effort normal and breath sounds normal. He is intubated.   Abdominal: Soft. Bowel sounds are normal.   Musculoskeletal: He exhibits no edema or tenderness.   Skin: Skin is warm and dry.       Labs:  Recent Labs      04/24/18   0504  04/25/18   0443   ISTATAPH  7.530*  7.540*   ISTATAPCO2  34.8  34.3   ISTATAPO2  72  84   ISTATATCO2  30  30   [id number]  96  98   ISTATARTHCO3  29.0*  29.4*   ISTATARTBE  6*  6*   ISTATTEMP  36.1 C  37.2 C   ISTATFIO2  30  30   ISTATSPEC  Arterial  Arterial   ISTATAPHTC  7.544*  7.537*   [id number]  68  86     Recent Labs      04/23/18   1139   CPKTOTAL  223*     Recent Labs      04/24/18   0513  04/25/18   0530  04/26/18   0400   SODIUM  132*  137  142   POTASSIUM  4.2  3.7  3.6   CHLORIDE  97  99  101   CO2  25  28  33   BUN  40*  45*  37*   CREATININE  2.11*  2.09*  2.10*   CALCIUM  8.0*  8.4*  8.5     Recent Labs      04/23/18   1139  04/24/18   0513  04/25/18   0530  04/26/18   0400   ALTSGPT   --    --    --   52*   ASTSGOT   --    --    --   62*   ALKPHOSPHAT   --    --    --   589*   TBILIRUBIN   --    --    --   0.7   GAMMAGT  494*   --    --    --    GLUCOSE   --   218*  113*  84     Recent Labs      04/24/18   0513  04/25/18   0530  04/26/18   0400   RBC  2.47*  2.38*  2.52*   HEMOGLOBIN  7.7*  7.2*  7.9*   HEMATOCRIT  25.3*  22.8*  24.5*    PLATELETCT  170  162*  196   PROTHROMBTM  13.6   --    --    INR  1.07   --    --      Recent Labs      18   0513  18   0530  18   0400   WBC  8.1  9.4  11.2*   NEUTSPOLYS  80.10*  79.60*  79.10*   LYMPHOCYTES  11.70*  12.60*  12.50*   MONOCYTES  6.40  5.90  5.20   EOSINOPHILS  0.20  0.50  2.10   BASOPHILS  0.40  0.40  0.20   ASTSGOT   --    --   62*   ALTSGPT   --    --   52*   ALKPHOSPHAT   --    --   589*   TBILIRUBIN   --    --   0.7           Hemodynamics:  Temp (24hrs), Av.6 °C (99.7 °F), Min:37.4 °C (99.3 °F), Max:37.9 °C (100.2 °F)  Temperature: 37.4 °C (99.3 °F), Monitored Temp: 37.4 °C (99.3 °F)  Pulse  Av.2  Min: 37  Max: 81Heart Rate (Monitored): 71  NIBP: 148/64    Respiratory:    Respiration: 17, Pulse Oximetry: 100 %, O2 Daily Delivery Respiratory : Silicone Nasal Cannula     Work Of Breathing / Effort: Grunting  RUL Breath Sounds: Clear, RML Breath Sounds: Diminished, RLL Breath Sounds: Diminished, CATALINA Breath Sounds: Clear, LLL Breath Sounds: Diminished  Fluids:    Intake/Output Summary (Last 24 hours) at 18 1127  Last data filed at 18 0600   Gross per 24 hour   Intake              560 ml   Output             2750 ml   Net            -2190 ml     Weight: 86.5 kg (190 lb 11.2 oz)  GI/Nutrition:  Orders Placed This Encounter   Procedures   • Diet NPO     Standing Status:   Standing     Number of Occurrences:   1     Order Specific Question:   Restrict to:     Answer:   Strict [1]     Medical Decision Making, by Problem:  Active Hospital Problems    Diagnosis   • *Encephalopathy [G93.40]   • Shock circulatory (CMS-HCC) [R57.9]   • Acute respiratory failure (CMS-[facility]) [J96.00]   • Acute blood loss anemia [D62]   • Diabetes mellitus type 2, controlled (CMS-[facility]) [E11.9]   • Cerebral ataxia (CMS-HCC) [G11.9]   • Hypothermia [T68.XXXA]   • HTN (hypertension) [I10]   • Acute on chronic kidney failure (CMS-HCC) [N17.9, N18.9]   • GERD (gastroesophageal reflux disease)  [K21.9]   • Dyslipidemia [E78.5]     1. CAR - Continue holding HD, monitor urine output  2. Anemia - as no longer on HD, stop epogen  3. Respiratory failure / volume overload - Extubated, improving    -Hold HD  -Will follow  Quality-Core Measures

## 2018-04-26 NOTE — PROGRESS NOTES
[facility] Hospitalist Progress Note    Date of Service: 2018    Chief Complaint  77 y.o. male admitted 2018 with altered mental status.    Interval Problem Update  Mr. Connor has a history of diabetes and hypertension that presented to the ER in [place], [place] with slurred speech and right facial droop. He developed confusion and bradycardia. He had an MRI of the brain which was negative for acute processes. He was transferred to [facility] and has required intubation due to inability to protect his airway.   An EEG was done on  which is suggestive of seizure activity and he is on IV Keppra.    He was successfully extubated on . Mr. Connor is awake, alert, and his speech is clear.  His wife is at bedside and we discussed his condition.  NG tube placed last night with 1 liter out. RN notes ongoing ectopy. His BP has been elevated. Alk phos is over 500.    Consultants/Specialty  Neurology  Critical Care.  I discussed his condition with Dr. Randall on ICU Hot Rounds.   Cardiology  Nephrology. I discussed his condition with Dr. Rahman today.    Disposition  ICU        Review of Systems   Constitutional: Negative for chills and fever.   Respiratory: Negative for shortness of breath.    Cardiovascular: Negative for chest pain.   Gastrointestinal:        He is anxious to eat.   Neurological:        Diffusely weak.     All other systems reviewed and are negative.     Physical Exam  Laboratory/Imaging   Hemodynamics  Temp (24hrs), Av.6 °C (99.7 °F), Min:37.4 °C (99.3 °F), Max:37.9 °C (100.2 °F)   Temperature: 37.4 °C (99.3 °F), Monitored Temp: 37.4 °C (99.3 °F)  Pulse  Av.2  Min: 37  Max: 81 Heart Rate (Monitored): 71  NIBP: 148/64     Respiratory  Gibson Vent Mode: Spont, Rate (breaths/min): 12, PEEP/CPAP: 8, PEEP/CPAP: 8, FiO2: 30, P Peak (PIP): 17, P MEAN: 10   Respiration: 17, Pulse Oximetry: 100 %, O2 Daily Delivery Respiratory : Silicone Nasal Cannula     Work Of Breathing / Effort: Grunting  RUL  Breath Sounds: Clear, RML Breath Sounds: Diminished, RLL Breath Sounds: Diminished, CATALINA Breath Sounds: Clear, LLL Breath Sounds: Diminished    Fluids    Intake/Output Summary (Last 24 hours) at 04/26/18 0723  Last data filed at 04/26/18 0600   Gross per 24 hour   Intake            976.8 ml   Output             3050 ml   Net          -2073.2 ml       Nutrition  Orders Placed This Encounter   Procedures   • Diet NPO     Standing Status:   Standing     Number of Occurrences:   1     Order Specific Question:   Restrict to:     Answer:   Strict [1]     Physical Exam   Constitutional: He is oriented to person, place, and time. No distress.   HENT:   cortrak nares. NG nares.    Eyes: Right eye exhibits no discharge. Left eye exhibits no discharge. No scleral icterus.   Neck: Neck supple.   Left IJ cand right dialysis catheter   Cardiovascular:   Murmur heard.  Irregular  Rate-controlled.   Pulmonary/Chest:   Good air movement.  No wheezing. Few crackles.    Abdominal: Bowel sounds are normal. He exhibits distension. There is no tenderness.   Genitourinary:   Genitourinary Comments: Holland catheter.   Musculoskeletal: He exhibits no edema or tenderness.   Soft wrist restraints.   Neurological: He is alert and oriented to person, place, and time.   Sleepy, he follows commands.    Skin: He is not diaphoretic.   Bilateral forearm bruising.   Nursing note and vitals reviewed.      Recent Labs      04/24/18   0513  04/25/18   0530  04/26/18   0400   WBC  8.1  9.4  11.2*   RBC  2.47*  2.38*  2.52*   HEMOGLOBIN  7.7*  7.2*  7.9*   HEMATOCRIT  25.3*  22.8*  24.5*   MCV  102.4*  95.8  97.2   MCH  31.2  30.3  31.3   MCHC  30.4*  31.6*  32.2*   RDW  61.9*  54.9*  55.4*   PLATELETCT  170  162*  196   MPV  11.1  10.7  10.8     Recent Labs      04/24/18   0513  04/25/18   0530  04/26/18   0400   SODIUM  132*  137  142   POTASSIUM  4.2  3.7  3.6   CHLORIDE  97  99  101   CO2  25  28  33   GLUCOSE  218*  113*  84   BUN  40*  45*  37*    CREATININE  2.11*  2.09*  2.10*   CALCIUM  8.0*  8.4*  8.5     Recent Labs      04/24/18   0513   INR  1.07         Recent Labs      04/25/18   0530   TRIGLYCERIDE  145          Assessment/Plan     * Encephalopathy- (present on admission)   Assessment & Plan    Patient presented with altered mental status which has improved after extubation  Outpatient MRI of the brain showed no evidence of pathology  Source is likely due to seizures.        Seizure (CMS-HCC)- (present on admission)   Assessment & Plan    Negative MRI prior to admit.  IV keppra change to oral  Neurology consulted  EEG:  DESCRIPTION OF THE RECORD:        The EEG background consists of mixed theta and delta. There are periodic epileptiform sharp over frontal ( R>L) these does not meet the criteria of electrographic seizures but highly suggestive of focal seizures from the right frontal           Shock circulatory (CMS-HCC)- (present on admission)   Assessment & Plan    S/p IV pressors.   He did not appear to be septic.        Acute on chronic kidney failure (CMS-HCC)- (present on admission)   Assessment & Plan    Patient has been started on dialysis for hyperkalemia and acute on chronic renal failure  Nephrology has been consulted.  Dialysis will be held and follow urine output and Cr.        Acute blood loss anemia- (present on admission)   Assessment & Plan    Patient had drop in his hemoglobin  Etiology unclear, he does not appear to have significant GI bleed  CT scan of the abdomen and pelvis 4/20/2018 shows no evidence of retroperitoneal hemorrhage  Hemoglobin has remained the 7 range.        Acute respiratory failure (CMS-HCC)- (present on admission)   Assessment & Plan    Extubated on 4/25.  Pulmonology consulted.         Diabetes mellitus type 2, controlled (CMS-HCC)- (present on admission)   Assessment & Plan    insulin sliding scale  Hold oral hypoglycemics while the patient is inpatient  HbA1c is 6.9        Dyslipidemia- (present on  admission)   Assessment & Plan    On statin therapy.  Monitor.         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

## 2018-04-26 NOTE — PROGRESS NOTES
Awake, able to move limbs with request  Since admission, no facial weakness was found  Hx of change of mental status-- could be metabolic, toxic or seizure  MRI done in local hospital negative

## 2018-04-27 NOTE — DIETARY
Nutrition support weekly update:  Day 7 of admit.  Joes Connor is a 77 y.o. male with admitting DX of Acute respiratory failure with hypoxia (CMS-HCC).     Tube feeding initiated on 4/20. TF recommendations are for Diabetisource AC, goal rate 70 ml/hr to provide 2016 kcal, 101 grams protein, and 1378 ml free water per day.  TF held since late 4/25 when pt had abdominal distention and imaging showed minor ileus. NGT placed to suction.  At this time, SLP evaluation is pending for possible diet order.     Assessment:  Weight 87 kg with BMI 28.32  Weight up slightly since admit despite -5.8 L fluid balance.  REE per MSJ x1.1-1.2 = 2097-9497 kcal/day  1.0-1.2 grams protein/kg =  grams protein/day    Recommendations/Plan:  1. Diet per SLP.  2. If TF to continue, recommend Diabetisource AC @ goal rate 65 ml/hr to provide 1872 kcal, 94 grams protein, and 1279 ml free water per day.    RD following.

## 2018-04-27 NOTE — PROGRESS NOTES
Hospital Medicine Progress Note, Adult, Complex               Author: Wallace Rahman Date & Time created: 4/27/2018  2:57 PM     Interval History:  77 year old with CAR and now on dialysis who came into the hospital with slurred speech, and found to have a negative MRI. 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     He is currently extubated. Good UOP overnight. Cr is up slightly.     Review of Systems:  Review of Systems   Constitutional: Negative for chills and fever.   All other systems reviewed and are negative.      Physical Exam:  Physical Exam   Constitutional: He appears well-developed and well-nourished.   Cardiovascular: Normal rate and regular rhythm.    Pulmonary/Chest: Effort normal and breath sounds normal.   Musculoskeletal: He exhibits no edema or tenderness.   Neurological: He is alert.   Skin: Skin is warm and dry.       Labs:  Recent Labs      04/25/18   0443   ISTATAPH  7.540*   ISTATAPCO2  34.3   ISTATAPO2  84   ISTATATCO2  30   [id number]  98   ISTATARTHCO3  29.4*   ISTATARTBE  6*   ISTATTEMP  37.2 C   ISTATFIO2  30   ISTATSPEC  Arterial   ISTATAPHTC  7.537*   [id number]  86         Recent Labs      04/25/18   0530 04/26/18 0400  04/27/18   0429   SODIUM  137  142  144   POTASSIUM  3.7  3.6  3.5*   CHLORIDE  99  101  103   CO2  28  33  29   BUN  45*  37*  46*   CREATININE  2.09*  2.10*  2.29*   CALCIUM  8.4*  8.5  9.0     Recent Labs      04/25/18   0530  04/26/18 0400  04/27/18 0429   ALTSGPT   --   52*   --    ASTSGOT   --   62*   --    ALKPHOSPHAT   --   589*   --    TBILIRUBIN   --   0.7   --    GLUCOSE  113*  84  109*     Recent Labs      04/25/18   0530  04/26/18 0400  04/27/18 0429   RBC  2.38*  2.52*  2.72*   HEMOGLOBIN  7.2*  7.9*  8.5*   HEMATOCRIT  22.8*  24.5*  27.2*   PLATELETCT  162*  196  287     Recent Labs      04/25/18 0530  04/26/18 0400  04/27/18   0429   WBC  9.4  11.2*  10.6   NEUTSPOLYS  79.60*  79.10*  80.60*   LYMPHOCYTES  12.60*   12.50*  10.60*   MONOCYTES  5.90  5.20  5.70   EOSINOPHILS  0.50  2.10  1.80   BASOPHILS  0.40  0.20  0.60   ASTSGOT   --   62*   --    ALTSGPT   --   52*   --    ALKPHOSPHAT   --   589*   --    TBILIRUBIN   --   0.7   --            Hemodynamics:  Temp (24hrs), Av.1 °C (98.8 °F), Min:37.1 °C (98.8 °F), Max:37.1 °C (98.8 °F)  Temperature: 37.1 °C (98.8 °F), Monitored Temp: 37.2 °C (99 °F)  Pulse  Av  Min: 37  Max: 81Heart Rate (Monitored): 63  NIBP: 152/48    Respiratory:    Respiration: 17, Pulse Oximetry: 98 %     Work Of Breathing / Effort: Vented  RUL Breath Sounds: Clear, RML Breath Sounds: Diminished, RLL Breath Sounds: Diminished, CATALINA Breath Sounds: Clear, LLL Breath Sounds: Diminished  Fluids:    Intake/Output Summary (Last 24 hours) at 18 1457  Last data filed at 18 0800   Gross per 24 hour   Intake              175 ml   Output             2875 ml   Net            -2700 ml     Weight: 87 kg (191 lb 12.8 oz)  GI/Nutrition:  Orders Placed This Encounter   Procedures   • Diet NPO     Standing Status:   Standing     Number of Occurrences:   1     Order Specific Question:   Restrict to:     Answer:   Strict [1]     Medical Decision Making, by Problem:  Active Hospital Problems    Diagnosis   • *Encephalopathy [G93.40]   • Shock circulatory (CMS-[facility]) [R57.9]   • Acute respiratory failure (CMS-[facility]) [J96.00]   • Acute blood loss anemia [D62]   • Diabetes mellitus type 2, controlled (CMS-[facility]) [E11.9]   • Cerebral ataxia (CMS-[facility]) [G11.9]   • Hypothermia [T68.XXXA]   • HTN (hypertension) [I10]   • Acute on chronic kidney failure (CMS-[facility]) [N17.9, N18.9]   • GERD (gastroesophageal reflux disease) [K21.9]   • Dyslipidemia [E78.5]     1. CAR - No need for RRT, will monitor urine output closely.   2. Anemia - Hgb 8.5, off epogen given he is no longer on dialysis    -Still holding HD  -Will follow  Quality-Core Measures

## 2018-04-27 NOTE — PROGRESS NOTES
"Pulmonary Critical Care Progress Note        Date of Admission:  4/21/2018    Chief Complaint: ALOC    History of Present Illness: 77-year-old  lives in [place], [place].    Apparently yesterday, he began to have slurred   speech and some right-sided facial droop.  The wife took him to the hospital.    He had an MRI which apparently was normal.  This morning the wife noted him   to be somewhat confused and acting \"spacey.\"  He presented back to the   hospital in [place].  He was reportedly bradycardic into the 20s.  He was given   atropine with improvement in his heart rate and mentation.  He was transferred   to [facility] for subspecialty care.  After arriving at [facility], he has developed   hypoxemia.  He was somewhat combative.  He was requiring increasing amounts of   oxygen and he was intubated.  He has also been found to be hypoglycemic.     Dextrose was administered.  He also had elevated potassium and he has received   calcium.  A rectal temperature reveals that his temperature is 90 degrees and   he is now being externally warmed with a Carolynn Hugger.    Review of Systems   Constitutional: Negative for chills, diaphoresis and fever.   HENT: Negative for congestion and nosebleeds.    Eyes: Negative.    Respiratory: Positive for shortness of breath (improved - SARMIENTO). Negative for cough and sputum production.    Cardiovascular: Negative for chest pain and palpitations.   Gastrointestinal: Positive for nausea (remains better after NG to suction placed - wants to eat?). Negative for abdominal pain, diarrhea and vomiting.   Genitourinary: Negative.    Musculoskeletal: Negative.    Skin: Negative.    Neurological: Negative for sensory change, speech change, focal weakness and headaches.   Endo/Heme/Allergies: Negative.    Psychiatric/Behavioral: The patient has insomnia (persisting). The patient is not nervous/anxious.        Interval Events:  24 hour interval history reviewed     Remains off Vent  2 lpm NC O2  NG to suction 650cc " last 12 hrs - 1250cc since being palced < 24 hrs ago  No further N/V - hungry?  Tm 36.9 - WBC 10.6 - down  I/Os neg 2600cc  IS 1200  Unasyn complete yesterday  WBC 10  K 3.5  Starting to mobilize  No further Sz activity/altered LOC - voice better    Updated wife at bedside again     YESTERDAY  Extubated yesterday  HF NC O2 started last night -> sats great at 100% this AM  Ileus late yesterday  Better overnight with NG placed - 500cc out during day - NG kept in place  TF on hold  DEX weaned off  Follows well - A&O x4  Hemodynamics reviewed  Tm 100.6  No new Sz  HD per renal  Hct better - no bleeding  Renal function no delta  Transaminases better  - Alk-phos worse - Bili normal  Unasyn  Sugars reviewed    Updated wife  Neuro consult pending    PFSH:  No change.    General:  Nontoxic, more alert, NAD on NC O2, appears older than stated age, following better, voice good    Skin:  Warm and dry, no rash or lesion - no change    Respiratory:     Pulse Oximetry: 98 %  NC O2        Exam: unlabored respirations, no intercostal retractions or accessory muscle use    Diminished BS at bases - few BB coarse rhonchi - no delta   No cough    ImagingCXR  I have personally reviewed the chest x-ray my impression is  noted above and films are reviewed with Team on rounds     Recent Labs      04/25/18   0443   ISTATAPH  7.540*   ISTATAPCO2  34.3   ISTATAPO2  84   ISTATATCO2  30   [id number]  98   ISTATARTHCO3  29.4*   ISTATARTBE  6*   ISTATTEMP  37.2 C   ISTATFIO2  30   ISTATSPEC  Arterial   ISTATAPHTC  7.537*   [id number]  86       HemoDynamics:  Pulse: (!) 48, Heart Rate (Monitored): 63  NIBP: 136/64      Exam: regular rate and rhythm, no ectopy, sinus bradycardia, no murmur - no delta  Imaging: Available data reviewed     ECHO 4/20  CONCLUSIONS  Technically difficult but adequate study for interpretation.   Normal left ventricular chamber size.  Normal left ventricular systolic function.  Left ventricular ejection fraction is  visually estimated to be 65%.  Difficult to comment on regional wall motion but mid inferior wall appeared hypokinetic.  The aortic valve is not well visualized.  Moderate aortic insufficiency.  Mild aortic stenosis. Transvalvular gradients are - Peak: 29  mmHg,   Mean: 12 mmHg.  Trace mitral regurgitation.  The left atrium is normal in size.  Normal inferior vena cava size without inspiratory collapse.  The right ventricle was normal in size and function.  Compared to prior study 4/2014, inferior wall motion abnormality is now Seen          Neuro:  GCS Total Valdemar Coma Score: 15     DEX drip off 4/25    Exam: no focal deficits noted at times, follows well - moves all to command, CN intact, no Sz activity    Imaging: Available data reviewed     EEG 4/23:  This scalp EEG is consistent with               1. Diffuse nonspecific cortical dysfunction - moderate to severe              2. Focal cortical dysfunction and irritability over frontal -- R>L , focal seizures are likely  There are no electrographic seizures in this short record though  Advise anti-seizure medication if the patient remains confused    Fluids:  Intake/Output       04/25/18 0700 - 04/26/18 0659 04/26/18 0700 - 04/27/18 0659 04/27/18 0700 - 04/28/18 0659      [id number] 5631-5303 Total 3589-7653 2997-8176 Total 0299-5863 4052-2531 Total       Intake    I.V.  136.8  -- 136.8  100  -- 100  --  -- --    Precedex Volume 136.8 -- 136.8 -- -- -- -- -- --    IV Piggyback Volume (IV Piggyback) -- -- -- 100 -- 100 -- -- --    Other  --  -- --  --  50 50  --  -- --    Medications (P.O./ Enteral Liquids) -- -- -- -- 50 50 -- -- --    Enteral  840  -- 840  --  25 25  --  -- --    Free Water / Tube Flush -- -- -- -- 25 25 -- -- --    Intake (mL) 840 -- 840 -- -- -- -- -- --    Total Intake 976.8 -- 976.8 100 75 175 -- -- --       Output    Urine  460  440 900  650  -- 650  --  -- --    Indwelling Cathether 460 440 900 650 -- 650 -- -- --    Drains  0  -- 0  --   -- --  --  -- --    Residual Amount (ml) (Discarded) 0 -- 0 -- -- -- -- -- --    Dialysis  1500  -- 1500  --  -- --  --  -- --    Dialysis Output (Dialysis Output) 1500 -- 1500 -- -- -- -- -- --    Stool  --  -- --  --  -- --  --  -- --    Number of Times Stooled -- -- -- -- 0 x 0 x -- -- --    Emesis/NG output  --  650 650  600  -- 600  --  -- --    Output (mL) (Enteral Tube Right Nare Nasogastric) -- 650 650 600 -- 600 -- -- --    Total Output 1960 1090 3050 1250 -- 1250 -- -- --       Net I/O     -983.2 -1090 -2073.2 -1150 75 -1075 -- -- --        Weight: 87 kg (191 lb 12.8 oz)  Recent Labs      18   SODIUM  137  142  144   POTASSIUM  3.7  3.6  3.5*   CHLORIDE  99  101  103   CO2  28  33  29   BUN  45*  37*  46*   CREATININE  2.09*  2.10*  2.29*   CALCIUM  8.4*  8.5  9.0       GI/Nutrition:  Exam: abdomen is soft and non-tender, normal active bowel sounds - improved, no CVAT    Less distended, NT/no guarding, no CVAT/+BS    Imaging: Available data reviewed     KUB  ileus    NPO - cortrak with TF on Hold - NG to suction - output slowing down over course of the day    TF on hold afternoon     Liver Function  Recent Labs      18   042   ALTSGPT   --   52*   --    ASTSGOT   --   62*   --    ALKPHOSPHAT   --   589*   --    TBILIRUBIN   --   0.7   --    GLUCOSE  113*  84  109*       Heme:  Recent Labs      18   RBC  2.38*  2.52*  2.72*   HEMOGLOBIN  7.2*  7.9*  8.5*   HEMATOCRIT  22.8*  24.5*  27.2*   PLATELETCT  162*  196  287       Infectious Disease:  Monitored Temp 2  Av.5 °C (99.5 °F)  Min: 37.1 °C (98.8 °F)  Max: 37.7 °C (99.9 °F)  Micro: reviewed  Recent Labs      18   0530  18   0400  18   0429   WBC  9.4  11.2*  10.6   NEUTSPOLYS  79.60*  79.10*  80.60*   LYMPHOCYTES  12.60*  12.50*  10.60*   MONOCYTES  5.90  5.20  5.70   EOSINOPHILS  0.50  2.10   1.80   BASOPHILS  0.40  0.20  0.60   ASTSGOT   --   62*   --    ALTSGPT   --   52*   --    ALKPHOSPHAT   --   589*   --    TBILIRUBIN   --   0.7   --      Current Facility-Administered Medications   Medication Dose Frequency Provider Last Rate Last Dose   • levETIRAcetam (KEPPRA) 100 MG/ML solution 1,000 mg  1,000 mg Q12HRS Manpreet Gupta M.D.   1,000 mg at 04/26/18 2040   • mag hydrox-al hydrox-simeth (MAALOX PLUS ES or MYLANTA DS) suspension 10 mL  10 mL 4X/DAY PRN Domingo Randall M.D.   10 mL at 04/26/18 2040   • morphine (pf) 4 mg/ml injection 2-4 mg  2-4 mg Q4HRS PRN Umer Martins Jr., D.O.   2 mg at 04/27/18 0426   • heparin injection 5,000 Units  5,000 Units Q8HRS Domingo Randall M.D.   5,000 Units at 04/26/18 2040   • famotidine (PEPCID) tablet 20 mg  20 mg DAILY Domingo Randall M.D.   20 mg at 04/26/18 0818   • LORazepam (ATIVAN) injection 1-2 mg  1-2 mg Q HOUR PRN Manuel Gaffney M.D.       • heparin injection 3,000 Units  3,000 Units DIALYSIS PRN Fadi Najjar, M.D.   3,000 Units at 04/25/18 1225   • lidocaine (XYLOCAINE) 1 % solution  1 mL PRN Fadi Najjar, M.D.       • Respiratory Care per Protocol   Continuous RT Manuel Gaffney M.D.       • fentaNYL (SUBLIMAZE) injection 25 mcg  25 mcg Q HOUR PRN Manuel Gaffney M.D.        Or   • fentaNYL (SUBLIMAZE) injection 50 mcg  50 mcg Q HOUR PRN Manuel Gaffney M.D.   50 mcg at 04/25/18 2301    Or   • fentaNYL (SUBLIMAZE) injection 100 mcg  100 mcg Q HOUR PRN Manuel Gaffney M.D.   100 mcg at 04/25/18 0521   • insulin regular (HUMULIN R) injection 2-9 Units  2-9 Units Q6HRS Manuel Gaffney M.D.   Stopped at 04/26/18 0000    And   • glucose 4 g chewable tablet 16 g  16 g Q15 MIN PRN Manuel Gaffney M.D.        And   • dextrose 50% (D50W) injection 25 mL  25 mL Q15 MIN PRN Manuel Gaffney M.D.       • ondansetron (ZOFRAN) syringe/vial injection 4 mg  4 mg Q4HRS PRN Hernan Dallas M.D.   4 mg at  04/25/18 2012   • Pharmacy Consult: Enteral tube feeding - review meds/change route/product selection  1 Each PRN Bryan Meza M.D.       • senna-docusate (PERICOLACE or SENOKOT S) 8.6-50 MG per tablet 2 Tab  2 Tab BID Stephon Trejo M.D.   2 Tab at 04/26/18 2040    And   • polyethylene glycol/lytes (MIRALAX) PACKET 1 Packet  1 Packet QDAY PRN Stephon Trejo M.D.   1 Packet at 04/25/18 2116    And   • magnesium hydroxide (MILK OF MAGNESIA) suspension 30 mL  30 mL QDAY PRN Stephon Trejo M.D.        And   • bisacodyl (DULCOLAX) suppository 10 mg  10 mg QDAY PRN Stephon Trejo M.D.       • acetaminophen (TYLENOL) tablet 650 mg  650 mg Q6HRS PRN Stephon Trejo M.D.   650 mg at 04/25/18 2239   • ondansetron (ZOFRAN ODT) dispertab 4 mg  4 mg Q4HRS PRN Stephon Trejo M.D.         Last reviewed on 4/25/2018  6:15 PM by Aruna Hagen    Quality  Measures:   Labs reviewed, Medications reviewed and Radiology images reviewed                      Problems:  Acute hypoxemic respiratory failure   - intubated 4/20   - full vent support; vent settings adjusted today again   - not appropriate for liberation   - serial ABG/CXR/vent mechanics review   - A- F bundle   - Mobilize/sedation vacations as tolerated   - wean Propofol - swap to DEX?  Ileus   - better with NG to suction   - IV meds   - low output over the day - midday clamped NG - start trickle TF 10cc/hr tonight? Don't advance   - Mobilize more/limit narcotics   - motility agents?  Acute unspecified encephalopathy - better - Sz?   - CT scan of the head in [place] is negative    - MRI negative for acute pathology   - ? Toxic/metabolic; w/u in progress, r/o Sz    - serial lab/exam - limit sedation   - KEPPRA started 4/22   - EEG suggest focal Sz 4/23   - Neuro eval ongoing   - f/u EEG - cont vEEG if persisting issue   - LOC/agitation better! 4/24 - resolved  Hypotension, unspecified - resolved   - IVF, titrated vasopressors   - wean HC when off NE 24 hrs   - w/u as above  Query  pneumonia - s/p 7 days Unasyn  Hypothermia - resolved  Acute blood loss anemia   - ? Source, doubt hemolysis, no evidence of GI loss   - CT  abdomen and pelvis - no retroperitoneal hemorrhage or ischemic bowel   - serial H/H - transfuse PRN  Acute kidney injury, on CKD?   - started daily RRT   - HD catheter   - nephro following   - Renally dose meds - avoid nephrotoxins  Elevated LFTs   - trend - min delta/mild - etiology?  Hyperkalemia   - s/p Calcium, bicarb    - RRT  Hyponatremia  Elevated lipase  Positive troponin   - suspect type 2/demand   - no evidence of ACS, trend  Hypoglycemia.  Diabetes mellitus type 2   - glycemic control   - 4/20 ECHO noted  History of systemic arterial hypertension.  Gastroesophageal reflux disease.  Dyslipidemia.  Chronic back pain.  Heparin prophylaxis, PPI to pepcid    Clamp NG - start trickle TF tonight?  Mobilize more as tolerated  PT/OT  SNU in [place]? - wife requesting  Swallow eval tomorrow if tolerates TF and LOC good  Ok to transfer out of ICU  HD on hold - pull HD cath?    Discussed patient condition and risk of morbidity and/or mortality with Family, RN, RT, Pharmacy, Dietary, , Charge nurse / hot rounds, Patient and hospitalist and neurology

## 2018-04-27 NOTE — CARE PLAN
Problem: Nutritional:  Goal: Achieve adequate nutritional intake  Patient on PO diet per SLP and consuming 50% of meals.  Outcome: NOT MET

## 2018-04-27 NOTE — THERAPY
"Occupational Therapy Evaluation completed.   Functional Status:  OT eval completed on 78 YO M admitted with right-sided facial droop, and slurred speech. Pt had experienced a seizure however symptoms had resolved at time of OT eval. Hx of cerebral ataxia. Pt's SO present for tx session and able to provide PLOF. Pt required intermittent physical assistance for functional transfers and BADLs. SO reports pt required increasing use of w/c for past month. Prior to month, pt could complete squat pivot transfers with SPV and was unsteady household ambulator with use of grab bars around house. Pt required mod A for supine>sit from elevated HOB, use of bed rails and HHA. Pt max A for LB dressing. Pt with posterior lean while EOB and required constant VC's and tactile cues for upright posture. Pt demonstrated scooting while EOB with CGA and increased time. Pt completed 3 partial stands with mod A. Pt min A for sit>supine. Pt's SO reports this is close to pt's baseline however this tx feels pt would require continued therapy prior to d/c home for continued strengthening. Will continue to follow for acute OT services while in-house.  Plan of Care: Will benefit from Occupational Therapy 2 times per week  Discharge Recommendations:  Equipment: No Equipment Needed. Post-acute therapy TBD    See \"Rehab Therapy-Acute\" Patient Summary Report for complete documentation.    "

## 2018-04-27 NOTE — PROGRESS NOTES
[facility] Hospitalist Progress Note    Date of Service: 2018    Chief Complaint  77 y.o. male admitted 2018 with altered mental status.    Interval Problem Update  Mr. Connor has a history of diabetes and hypertension that presented to the ER in [place], [place] with slurred speech and right facial droop. He developed confusion and bradycardia. He had an MRI of the brain which was negative for acute processes. He was transferred to [facility] and has required intubation due to inability to protect his airway.   An EEG was done on  which is suggestive of seizure activity and he is on IV Keppra.    He was successfully extubated on . Mr. Connor is awake, alert, and his speech is clear.   His wife is at bedside and we discussed his condition today.  NG tube placed last  with ongoing significant output. His BP has been elevated. Alk phos is over 500.   It took 3 people to get him up to chair. 1,200 on incentive spirometry. 875 ml urine over night.    Consultants/Specialty  Neurology  Critical Care.  I discussed his condition with Dr. Randall on ICU Hot Rounds.   Cardiology  Nephrology. I discussed his condition with Dr. Rahman     Disposition  tele        Review of Systems   Constitutional: Negative for chills and fever.   Respiratory: Negative for shortness of breath.    Cardiovascular: Negative for chest pain.   Gastrointestinal:        He is anxious to eat.   Neurological:        Diffusely weak.     All other systems reviewed and are negative.     Physical Exam  Laboratory/Imaging   Hemodynamics  Temp (24hrs), Av.1 °C (98.8 °F), Min:37.1 °C (98.8 °F), Max:37.1 °C (98.8 °F)   Temperature: 37.1 °C (98.8 °F), Monitored Temp: 37.1 °C (98.8 °F)  Pulse  Av  Min: 37  Max: 81 Heart Rate (Monitored): 61  NIBP: 138/61     Respiratory      Respiration: 16, Pulse Oximetry: 100 %     Work Of Breathing / Effort: Vented  RUL Breath Sounds: Clear, RML Breath Sounds: Diminished, RLL Breath Sounds: Diminished, CATALINA  Breath Sounds: Clear, LLL Breath Sounds: Diminished    Fluids    Intake/Output Summary (Last 24 hours) at 04/27/18 0925  Last data filed at 04/27/18 0800   Gross per 24 hour   Intake              175 ml   Output             2875 ml   Net            -2700 ml       Nutrition  Orders Placed This Encounter   Procedures   • Diet NPO     Standing Status:   Standing     Number of Occurrences:   1     Order Specific Question:   Restrict to:     Answer:   Strict [1]     Physical Exam   Constitutional: He is oriented to person, place, and time. He appears well-developed. No distress.   HENT:   cortrak nares. NG nares.    Eyes: Right eye exhibits no discharge. Left eye exhibits no discharge.   Neck: Neck supple.   Left IJ central line and right dialysis catheter   Cardiovascular:   Murmur heard.  Frequent PACs with underlying sinus rhythm.   Rate-controlled.   Pulmonary/Chest:   Good air movement.  No wheezing. Few crackles.    Abdominal: He exhibits distension. There is no tenderness.   Hypoactive though present bowel sounds   Genitourinary:   Genitourinary Comments: Holland catheter.   Musculoskeletal: He exhibits no edema or tenderness.   Heal protectors   Neurological: He is alert and oriented to person, place, and time.   Sleepy, he follows commands.    Skin: He is not diaphoretic.   Bilateral forearm bruising.   Psychiatric: He has a normal mood and affect. His behavior is normal.   Nursing note and vitals reviewed.      Recent Labs      04/25/18 0530 04/26/18 0400 04/27/18 0429   WBC  9.4  11.2*  10.6   RBC  2.38*  2.52*  2.72*   HEMOGLOBIN  7.2*  7.9*  8.5*   HEMATOCRIT  22.8*  24.5*  27.2*   MCV  95.8  97.2  100.0*   MCH  30.3  31.3  31.3   MCHC  31.6*  32.2*  31.3*   RDW  54.9*  55.4*  57.9*   PLATELETCT  162*  196  287   MPV  10.7  10.8  10.2     Recent Labs      04/25/18 0530 04/26/18 0400 04/27/18 0429   SODIUM  137  142  144   POTASSIUM  3.7  3.6  3.5*   CHLORIDE  99  101  103   CO2  28  33  29    GLUCOSE  113*  84  109*   BUN  45*  37*  46*   CREATININE  2.09*  2.10*  2.29*   CALCIUM  8.4*  8.5  9.0             Recent Labs      04/25/18   0530   TRIGLYCERIDE  145          Assessment/Plan     * Encephalopathy- (present on admission)   Assessment & Plan    Patient presented with altered mental status which has improved after extubation  Outpatient MRI of the brain showed no evidence of pathology  Source is likely due to seizures.  His encephalopathy has resolved.        Seizure ([facility])- (present on admission)   Assessment & Plan    Negative MRI prior to admit.  IV keppra changed to oral  Neurology consulted  EEG:  DESCRIPTION OF THE RECORD:        The EEG background consists of mixed theta and delta. There are periodic epileptiform sharp over frontal ( R>L) these does not meet the criteria of electrographic seizures but highly suggestive of focal seizures from the right frontal           Shock circulatory ([facility])- (present on admission)   Assessment & Plan    S/p IV pressors.   He did not appear to be septic.        Acute on chronic kidney failure ([facility])- (present on admission)   Assessment & Plan    Patient has been started on dialysis for hyperkalemia and acute on chronic renal failure  Nephrology has been consulted.  Dialysis will be held and follow urine output and Cr.  The dialysis line may be able to be removed soon        Acute blood loss anemia- (present on admission)   Assessment & Plan    Patient had drop in his hemoglobin  Etiology unclear, he does not appear to have significant GI bleed  CT scan of the abdomen and pelvis 4/20/2018 did not reveal evidence of retroperitoneal hemorrhage  Hemoglobin has remained the 7-8 range.        Acute respiratory failure ([facility])- (present on admission)   Assessment & Plan    Extubated on 4/25.  Pulmonology consulted.         Diabetes mellitus type 2, controlled ([facility])- (present on admission)   Assessment & Plan    insulin sliding scale  Hold oral hypoglycemics while  the patient is inpatient  HbA1c is 6.9        Dyslipidemia- (present on admission)   Assessment & Plan    On statin therapy.  Monitor.         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        HTN (hypertension)- (present on admission)   Assessment & Plan    He is normotensive, hold outpatient meds          Quality-Core Measures   Reviewed items::  Medications reviewed, Labs reviewed and Radiology images reviewed  Holland catheter::  Critically Ill - Requiring Accurate Measurement of Urinary Output  DVT prophylaxis pharmacological::  Heparin

## 2018-04-27 NOTE — CARE PLAN
Problem: Venous Thromboembolism (VTW)/Deep Vein Thrombosis (DVT) Prevention:  Goal: Patient will participate in Venous Thrombosis (VTE)/Deep Vein Thrombosis (DVT)Prevention Measures  Outcome: PROGRESSING AS EXPECTED  SQ heparin, SCDS, mobilization    Problem: Urinary Elimination:  Goal: Ability to reestablish a normal urinary elimination pattern will improve  Outcome: PROGRESSING SLOWER THAN EXPECTED    Intervention: Evaluate need to continue indwelling urinary catheter  Urinary catheter remains in place for strict I&O. HD per labs and nephro recommendations

## 2018-04-27 NOTE — DISCHARGE PLANNING
Medical SW    Sw attended AM IDT Rounds.    RN reports, pt A/O x4, blue in place, on 2 LPM nasal canula,     Plan: Sw to assist w/ d/c planning as needed.

## 2018-04-28 PROBLEM — R13.10 DYSPHAGIA: Status: ACTIVE | Noted: 2018-01-01

## 2018-04-28 PROBLEM — K56.7 ILEUS (HCC): Status: ACTIVE | Noted: 2018-01-01

## 2018-04-28 PROBLEM — I47.29 NSVT (NONSUSTAINED VENTRICULAR TACHYCARDIA) (HCC): Status: ACTIVE | Noted: 2018-01-01

## 2018-04-28 PROBLEM — N18.30 ACUTE RENAL FAILURE SUPERIMPOSED ON STAGE 3 CHRONIC KIDNEY DISEASE (HCC): Status: ACTIVE | Noted: 2018-01-01

## 2018-04-28 PROBLEM — E87.20 METABOLIC ACIDOSIS: Status: ACTIVE | Noted: 2018-01-01

## 2018-04-28 NOTE — PROGRESS NOTES
Patient brought up from [facility]. All belongings with patient. No complaints of pain. On 2L nasal cannula. Call light within reach, patient has no other needs at this time.

## 2018-04-28 NOTE — PROGRESS NOTES
Pt transferred to tele. Pt stable. Central line removed prior to transfer. Per policy with no issues.     1900 Md hospitalitis notified of HTN. Orders received.

## 2018-04-28 NOTE — DISCHARGE PLANNING
Medical SW    Referral: Sw received order for SNF.    Sw met w/ pt and wife at bedside.    Wife chose the local SNF in the hospital. She is requesting a swing bed at [facility] ([facility] on [address] [place], [phone].    Wife is requesting to accompany pt during transport to SNF. She also indicates if this is not able to happen, she will need notice to acquire transport for herself.    Sw faxed completed SNF choice to [facility].      Plan: Sw to assist w/ d/c planning as needed.

## 2018-04-28 NOTE — PROGRESS NOTES
2 RN skin check with Darius RN:    Patient's ears are non-red and blanching, patient has scattered bruising and scabbing, worse on the right arm. Patient's elbows are non-red and blanching. Patient's heels are red, blanching, flaky/dry, and boggy. Left foot has skin tear to achilles, scabs present on lateral and medial malleolus. Will place mepilet on skin tear. Sacrum non-red and blanching, mepilex in place. Distal penile edema present.

## 2018-04-28 NOTE — ASSESSMENT & PLAN NOTE
- Patient did pass swallowing evaluation with nectar thick liquids  - Patient also was on tube feeds, was tolerating mental overnight, concern for aspiration  - Patient has had tube feed-like substance suctioned with NT suctioning  - having BM  - restarted TB at 35cc/hr and slowly advance as tolerated, but he removed it yesterday  - monitor closely for aspiration  - GI consulted for PEG

## 2018-04-28 NOTE — PROGRESS NOTES
Since extubation 4/25 the patient is getting better everyday  Could express himself well, move all limbs with requests    If the patient is still in hospital next Monday, we will repeat EEG in Monday    Continue keppra for now  Also He promised to see neurologist in [place], [place] when he is discharged  Abnormal EEG when he was very sick days ago--- kidney and respiratory failure etc        ________________________________________________________________________    If circumstances change do not hesitate to re-consult neurology.     At this point, patient remains stable.      Thank you for the consult.     Rakel Vásquez M.D.  [facility]  8:27 PM04/27/18    ________________________________________________________________________

## 2018-04-28 NOTE — PROGRESS NOTES
Hospital Medicine Progress Note, Adult, Complex               Author: Wallace Rahman Date & Time created: 4/28/2018  5:47 AM     Interval History:  77 year old with CAR and now on dialysis who came into the hospital with slurred speech, and found to have a negative MRI. 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     Cr up, seems more dry, with some dry mouth.  Otherwise feeling about the same    Review of Systems:  Review of Systems   Constitutional: Negative for chills and fever.   All other systems reviewed and are negative.      Physical Exam:  Physical Exam   Constitutional: He is oriented to person, place, and time. He appears well-developed and well-nourished.   HENT:   Head: Normocephalic and atraumatic.   Cardiovascular: Normal rate and regular rhythm.    Pulmonary/Chest: Effort normal and breath sounds normal.   Abdominal: Soft. Bowel sounds are normal.   Musculoskeletal: He exhibits no edema or tenderness.   Neurological: He is alert and oriented to person, place, and time.   Skin: Skin is warm and dry.       Labs:        Invalid input(s): [id number]      Recent Labs      04/26/18 0400 04/27/18 0429 04/27/18 1747 04/28/18   0004   SODIUM  142  144   --   144   POTASSIUM  3.6  3.5*  3.6  3.8   CHLORIDE  101  103   --   101   CO2  33  29   --   31   BUN  37*  46*   --   54*   CREATININE  2.10*  2.29*   --   2.88*   CALCIUM  8.5  9.0   --   9.0     Recent Labs      04/26/18 0400 04/27/18 0429 04/28/18   0004   ALTSGPT  52*   --   37   ASTSGOT  62*   --   35   ALKPHOSPHAT  589*   --   363*   TBILIRUBIN  0.7   --   0.6   GLUCOSE  84  109*  138*     Recent Labs      04/26/18   0400  04/27/18 0429 04/28/18   0004   RBC  2.52*  2.72*  2.82*   HEMOGLOBIN  7.9*  8.5*  9.0*   HEMATOCRIT  24.5*  27.2*  28.3*   PLATELETCT  196  287  354     Recent Labs      04/26/18 0400  04/27/18 0429 04/28/18   0004   WBC  11.2*  10.6  8.5   NEUTSPOLYS  79.10*  80.60*  79.10*   LYMPHOCYTES   12.50*  10.60*  11.20*   MONOCYTES  5.20  5.70  6.90   EOSINOPHILS  2.10  1.80  1.60   BASOPHILS  0.20  0.60  0.40   ASTSGOT  62*   --   35   ALTSGPT  52*   --   37   ALKPHOSPHAT  589*   --   363*   TBILIRUBIN  0.7   --   0.6           Hemodynamics:  Temp (24hrs), Av.8 °C (98.2 °F), Min:36.4 °C (97.5 °F), Max:37.1 °C (98.8 °F)  Temperature: 36.4 °C (97.5 °F), Monitored Temp: 37.2 °C (99 °F)  Pulse  Av  Min: 37  Max: 81Heart Rate (Monitored): 71  Blood Pressure : 140/51, NIBP: (!) 171/63    Respiratory:    Respiration: 18, Pulse Oximetry: 95 %        RUL Breath Sounds: Clear, RML Breath Sounds: Diminished, RLL Breath Sounds: Diminished, CATALINA Breath Sounds: Clear, LLL Breath Sounds: Diminished  Fluids:    Intake/Output Summary (Last 24 hours) at 18 0547  Last data filed at 18 0400   Gross per 24 hour   Intake              330 ml   Output             2710 ml   Net            -2380 ml     Weight: 85.7 kg (188 lb 15 oz)  GI/Nutrition:  Orders Placed This Encounter   Procedures   • Diet NPO     Standing Status:   Standing     Number of Occurrences:   1     Order Specific Question:   Restrict to:     Answer:   Strict [1]     Medical Decision Making, by Problem:  Active Hospital Problems    Diagnosis   • *Encephalopathy [G93.40]   • Shock circulatory (CMS-[facility]) [R57.9]   • Acute respiratory failure (CMS-[facility]) [J96.00]   • Acute blood loss anemia [D62]   • Diabetes mellitus type 2, controlled (CMS-[facility]) [E11.9]   • Cerebral ataxia (CMS-[facility]) [G11.9]   • Hypothermia [T68.XXXA]   • HTN (hypertension) [I10]   • Acute on chronic kidney failure (CMS-HCC) [N17.9, N18.9]   • GERD (gastroesophageal reflux disease) [K21.9]   • Dyslipidemia [E78.5]     1. CAR - Cr moving back up, 2.29->2.88. I suspect this is due to some volume depletion,  Check urine Na/Cr. Will need to consider low dose IVF  2. Anemia - Improved at 9  3. Contraction alkalosis    -Hold HD  -Noted CXR better  -If FeNa < 1% then can consider low dose  IVF  -Will follow  Quality-Core Measures

## 2018-04-28 NOTE — THERAPY
"Speech Language Therapy Clinical Swallow Evaluation completed.  Functional Status: The patient is able to follow directions with cues due to decreased TY. He was disoriented to place and date. Oral motor exam was unremarkable. Laryngeal elevation palpated as weak. He consumed ice, nectar thick liquid, puree, pudding, soft solids and thin liquids. Belching was heard after every bolus of nectars and thins. Occasional belching with soft solids and purees. Significant belching and delayed gurgling noted on the 3rd trial of thins by tsp and the patient had a coughing episode. He reported he felt it coming back up but was not nauseated. Patient's family at bedside and report that he coughs during meals and has severe reflux. Soft solids were initially tolerated but the patient was falling asleep while trying to chew. Due to level of awareness and patient's current medical condition I recommend he start with a nectar thick full liquid diet. If TY improves he may be able to tolerate texture upgrade. Please implement aspiration and GERD precautions.    Recommendations - Diet: Diet / Liquid Recommendation: Nectar Thick Full Liquid (single ice chips okay)                          Strategies: Strict 1:1 feeding , No Straws and Head of Bed at 90 Degrees                          Medication Administration: Medication Administration : Via Gastric Tube  Plan of Care: Will benefit from Speech Therapy 3 times per week  Post-Acute Therapy: Discharge to a transitional care facility for continued skilled therapy services.    See \"Rehab Therapy-Acute\" Patient Summary Report for complete documentation.   "

## 2018-04-28 NOTE — DISCHARGE PLANNING
Received choice form from MANE Garcia at [facility] received fax number of [phone].  Referral has been faxed as pre request.

## 2018-04-28 NOTE — PROGRESS NOTES
[facility] Hospitalist Progress Note    Date of Service: 4/28/2018    Chief Complaint  77 y.o. male with cerebral ataxia, type II diabetes mellitus, hypertension, history of gout, GERD, admitted 4/19/2018 with altered mentation, and woke up with right-sided facial droop, and slurred speech, seen by a local physician and felt to have Bell's palsy and was sent home, but subsequently developed altered mentation and bradycardia. Found to have elevated troponin. Cardiology felt that this bradycardia was due to metabolic derangements, with  Type II NSTEMI. Was very agitated, and hypoxic requiring intubation with mechanical ventilation. He was also hypotensive, and required fluids and pressors. He had an MRI of the brain which was negative for acute process. EEG was done which is suggestive of seizure activity, and started on Keppra. Extubated on 425. Also developed progressive acute kidney injury, with hyperkalemia and hypercalcemia, and subsequently required dialysis. He had possible pneumonia, and completed 7 days of Unasyn. Also had acute blood loss anemia with unclear source, as no evidence of GI loss. CT of the abdomen and pelvis showed no retroperitoneal hemorrhage or ischemic bowel. Also developed ileus, requiring NG.      Interval Problem Update  4/28/2018 - no overnight events. (+) occasioanal SVTs on tele. Transferred from the ICU overnight. Remains hemodynamically stable and afebrile. Stable on 3L O2 NC. No leukocytosis. Hemoglobin stable at 9. Creatinine 2.88, with normal sodium and potassium. Urine sent.     > Seen and examined. Comfortable. No pain. No CP, SOB. Mentation back to baseline. Wants to try urinating on his own, mcarthur out. (+) BM on 4/26, passing flatus. Wife states he's always been wheel chair bound due to his ataxia.      Consultants/Specialty  [facility]  Nephrology  Neurology  Cardiology    Disposition  Monitor on telemetry. SNF placement.         ROS     Pertinent positives/negatives as mentioned above.      A complete review of systems was done. All other systems were negative.      Physical Exam  Laboratory/Imaging   Hemodynamics  Temp (24hrs), Av.6 °C (97.9 °F), Min:36.4 °C (97.5 °F), Max:37.1 °C (98.8 °F)   Temperature: 36.4 °C (97.5 °F), Monitored Temp: 37.2 °C (99 °F)  Pulse  Av  Min: 37  Max: 81 Heart Rate (Monitored): 71  Blood Pressure : 140/51, NIBP: (!) 171/63     Respiratory      Respiration: 18, Pulse Oximetry: 95 %        RUL Breath Sounds: Clear, RML Breath Sounds: Diminished, RLL Breath Sounds: Diminished, CATALINA Breath Sounds: Clear, LLL Breath Sounds: Diminished    Fluids    Intake/Output Summary (Last 24 hours) at 18 0801  Last data filed at 18 0400   Gross per 24 hour   Intake              300 ml   Output             1085 ml   Net             -785 ml       Nutrition  Orders Placed This Encounter   Procedures   • Diet NPO     Standing Status:   Standing     Number of Occurrences:   1     Order Specific Question:   Restrict to:     Answer:   Strict [1]     Physical Exam   Constitutional: He is oriented to person, place, and time. He appears well-developed. No distress.   Body mass index is 27.9 kg/m².   HENT:   Head: Normocephalic and atraumatic.   Mouth/Throat: Oropharynx is clear and moist. No oropharyngeal exudate.   Cortrak in place   Eyes: EOM are normal. Pupils are equal, round, and reactive to light. Right eye exhibits no discharge. Left eye exhibits no discharge. No scleral icterus.   Neck: Normal range of motion. Neck supple. No thyromegaly present.   Cardiovascular: Normal rate and regular rhythm.  Exam reveals no gallop and no friction rub.    No murmur heard.  Pulmonary/Chest: Effort normal and breath sounds normal. He has no wheezes. He has no rales. He exhibits no tenderness.   Diminished air entry B/L bases, otherwise clear to auscultation   Abdominal: Soft. Bowel sounds are normal. There is no tenderness. There is no rebound and no guarding.   Genitourinary:    Genitourinary Comments: Holland catheter in place   Musculoskeletal: Normal range of motion. He exhibits no edema or tenderness.   Lymphadenopathy:     He has no cervical adenopathy.   Neurological: He is alert and oriented to person, place, and time.   Skin: Skin is warm and dry. No rash noted. He is not diaphoretic. No erythema.   Psychiatric: He has a normal mood and affect. His behavior is normal. Thought content normal.   Vitals reviewed.          Recent Labs      04/26/18   0400  04/27/18   0429  04/28/18   0004   WBC  11.2*  10.6  8.5   RBC  2.52*  2.72*  2.82*   HEMOGLOBIN  7.9*  8.5*  9.0*   HEMATOCRIT  24.5*  27.2*  28.3*   MCV  97.2  100.0*  100.4*   MCH  31.3  31.3  31.9   MCHC  32.2*  31.3*  31.8*   RDW  55.4*  57.9*  58.0*   PLATELETCT  196  287  354   MPV  10.8  10.2  10.1     Recent Labs      04/26/18   0400  04/27/18   0429 04/27/18   1747  04/28/18   0004  04/28/18   0544   SODIUM  142  144   --   144   --    POTASSIUM  3.6  3.5*  3.6  3.8  3.6   CHLORIDE  101  103   --   101   --    CO2  33  29   --   31   --    GLUCOSE  84  109*   --   138*   --    BUN  37*  46*   --   54*   --    CREATININE  2.10*  2.29*   --   2.88*   --    CALCIUM  8.5  9.0   --   9.0   --                       Assessment/Plan     * Acute encephalopathy due to seizures- (present on admission)   Assessment & Plan    - MRI negative. EEG consistent with seizures. Improved after extubation. Now back to baseline.  - continue keppra for seizures.   - Continue to monitor closely.        Seizure (HCC)- (present on admission)   Assessment & Plan    - MRI negative. No further seizures.   - continue IV keppra for now until able to take PO.   - neurology following.   - continue to monitor for seizures. Maintain seizure precautions.         Shock circulatory (HCC)- (present on admission)   Assessment & Plan     - needed pressors. Resolved. Does not appear to be septic. Suspect hypovolemic shock.   - Continue to monitor hemodynamics  closely.        NSVT (nonsustained ventricular tachycardia) ([facility])   Assessment & Plan    - monitor on telemetry. Check Mg. Keep K~4, Mg~2.         Dysphagia   Assessment & Plan    - will have SLP to reevaluate his swallowing. Continue cortrak and tube feeding for now.   - will need continued speech and swallow therapy. Needs SNF placement.         Metabolic acidosis- (present on admission)   Assessment & Plan    - resolved.   - continue to monitor.         Ileus ([facility])   Assessment & Plan    - (+) flatus. Tolerating trickle tube feeding.  - repeat abdominal xray to reevaluate ileus. Consider escalating feeding if ileus has resolved.         Acute renal failure superimposed on stage 3 chronic kidney disease (CMS-[facility])- (present on admission)   Assessment & Plan    - required hemodialysis. Improved. Nephrology holding off on further dialysis, see if renal function will continue to recover.   - monitor renal function closely.         Anemia- (present on admission)   Assessment & Plan    - Patient had drop in his hemoglobin. Unclear etiology. Does not appear to have significant GI bleed. CT scan of the abdomen and pelvis 4/20/2018 did not reveal evidence of retroperitoneal hemorrhage.  - Hemoglobin stable. Transfuse for hemoglobin less than 7.  - Check iron, ferritin, TIBC, folate, vitamin B12, reticulocyte count, and LDH.        Acute respiratory failure with hypoxia, due to encephalopathy, possible pneumonia ([facility])- (present on admission)   Assessment & Plan    - required intubation. Extubated on 4/25. Doing well post extubation.  - Completed 7 days course of Unasyn.  - Continue bronchiodilators per RT protocol. Continue oxygen supplementation, try to wean as able keep saturations above 88%.        Cerebral ataxia ([facility])- (present on admission)   Assessment & Plan    - chronic. Pt is wheelchair bound at baseline.   - Needs SNF placement. CM/IGNACIO working on referrals.        Diabetes mellitus type 2, controlled (CMS-[facility])-  (present on admission)   Assessment & Plan    - Hold metformin while in-house. Continue same scale insulin coverage, with Accu-Cheks monitoring.         Dyslipidemia- (present on admission)   Assessment & Plan    - Not on statin. Resume gemfibrozil.        HTN (hypertension)- (present on admission)   Assessment & Plan    - fair control.   - Increase Norvasc to 10 mg daily. Holding lisinopril due to his renal failure. Continue to monitor blood pressure trend, with PRN IV hydralazine for significant hypertension.          Quality-Core Measures   Reviewed items::  Labs reviewed, Medications reviewed and Radiology images reviewed  Holland Catheter: discontinue Holland.  DVT prophylaxis pharmacological::  Heparin  Ulcer Prophylaxis::  Not indicated  Assessed for rehabilitation services:  Patient was assess for and/or received rehabilitation services during this hospitalization

## 2018-04-28 NOTE — RESPIRATORY CARE
COPD EDUCATION by COPD CLINICAL EDUCATOR  4/27/2018 at 5:12 PM by Arpita Branch     Patient reviewed by COPD education team. Patient does not qualify for COPD program.

## 2018-04-28 NOTE — CARE PLAN
Problem: Skin Integrity  Goal: Risk for impaired skin integrity will decrease    Intervention: Assess risk factors for impaired skin integrity and/or pressure ulcers  Pt turned q2 hours while in bed.      Problem: Pain Management  Goal: Pain level will decrease to patient's comfort goal    Intervention: Follow pain managment plan developed in collaboration with patient and Interdisciplinary Team  Pt monitored for s/s of pain.  Intervention: Educate and implement non-pharmacologic comfort measures. Examples: relaxation, distration, play therapy, activity therapy, massage, etc.  Pt monitored for s/s of pain.

## 2018-04-28 NOTE — PROGRESS NOTES
NG tube fell out while logrolling patient. Dr. Bryson called and notified, gave OK [facility] leave [facility]

## 2018-04-28 NOTE — PROGRESS NOTES
Pharmacy notified of central line removal. Requested 20 meq of potassium to be changed to two 10 meq.

## 2018-04-29 NOTE — PROGRESS NOTES
[facility] Hospitalist Progress Note    Date of Service: 4/29/2018    Chief Complaint  77 y.o. male with cerebral ataxia, type II diabetes mellitus, hypertension, history of gout, GERD, admitted 4/19/2018 with altered mentation, and woke up with right-sided facial droop, and slurred speech, seen by a local physician and felt to have Bell's palsy and was sent home, but subsequently developed altered mentation and bradycardia. Found to have elevated troponin. Cardiology felt that this bradycardia was due to metabolic derangements, with  Type II NSTEMI. Was very agitated, and hypoxic requiring intubation with mechanical ventilation. He was also hypotensive, and required fluids and pressors. He had an MRI of the brain which was negative for acute process. EEG was done which is suggestive of seizure activity, and started on Keppra. Extubated on 425. Also developed progressive acute kidney injury, with hyperkalemia and hypercalcemia, and subsequently required dialysis. He had possible pneumonia, and completed 7 days of Unasyn. Also had acute blood loss anemia with unclear source, as no evidence of GI loss. CT of the abdomen and pelvis showed no retroperitoneal hemorrhage or ischemic bowel. Also developed ileus, requiring NG.      Interval Problem Update  4/29/2018 - uneventful night. VSS. Afebrile. Saturating well on 3L O2 NC.  Iron low, with normal ferritin, B12, folate, and increased reticulocyte count. FeNa ~=1.0. Reevaluated by speech therapy, recommending nectar thick full liquid diet. Holland catheter removed, patient needed straight cath. Creatinine increased to 3.28. Na 148. AXR showed no bowel obstruction/ileus. Ferritin-normal, with normal folate and B12, with low iron and saturation. LDH equivocal. Good reticulocyte count.    > Seen and examined. Confused this morning, got morphine last night. (+) coughing after getting thickened juice this morning. Goofy, knows where he is, but not oriented to time, month or year.  Denied any pain. No nausea, vomiting, CP, SOB.         Consultants/Specialty  [facility]  Nephrology  Neurology  Cardiology    Disposition  Monitor on telemetry. Await SNF placement.         ROS     Pertinent positives/negatives as mentioned above.     A complete review of systems was done, limited by confusion. All other systems were negative.      Physical Exam  Laboratory/Imaging   Hemodynamics  Temp (24hrs), Av °C (98.6 °F), Min:36.8 °C (98.3 °F), Max:37.4 °C (99.3 °F)   Temperature: 37.1 °C (98.8 °F)  Pulse  Av.1  Min: 37  Max: 85    Blood Pressure : 109/51     Respiratory      Respiration: 16, Pulse Oximetry: 94 %        RUL Breath Sounds: Clear, RML Breath Sounds: Diminished, RLL Breath Sounds: Diminished, CATALINA Breath Sounds: Clear, LLL Breath Sounds: Diminished    Fluids    Intake/Output Summary (Last 24 hours) at 18 0752  Last data filed at 18 0422   Gross per 24 hour   Intake              220 ml   Output             1125 ml   Net             -905 ml       Nutrition  Orders Placed This Encounter   Procedures   • DIET ORDER     Standing Status:   Standing     Number of Occurrences:   1     Order Specific Question:   Diet:     Answer:   Diabetic [3]     Comments:   No tomato based products due to severe GERD     Order Specific Question:   Diet:     Answer:   Full Liquid [11]     Order Specific Question:   Consistency/Fluid modifications:     Answer:   Nectar Thick [2]     Order Specific Question:   Miscellaneous modifications:     Answer:   SLP - 1:1 Supervision by Nursing [21]     Physical Exam   Constitutional: He appears well-developed. No distress.   HENT:   Head: Normocephalic and atraumatic.   Mouth/Throat: Oropharynx is clear and moist. No oropharyngeal exudate.   Cortrak in place.    Eyes: EOM are normal. Pupils are equal, round, and reactive to light. Right eye exhibits no discharge. Left eye exhibits no discharge. No scleral icterus.   Neck: Normal range of motion. Neck supple. No  thyromegaly present.   Cardiovascular: Normal rate and regular rhythm.  Exam reveals no gallop and no friction rub.    No murmur heard.  Pulmonary/Chest: Effort normal. He has no wheezes. He has no rales. He exhibits no tenderness.   Diminished air entry B/L bases, otherwise clear to auscultation   Abdominal: Soft. Bowel sounds are normal. There is no tenderness. There is no rebound and no guarding.   Musculoskeletal: Normal range of motion. He exhibits no edema or tenderness.   Lymphadenopathy:     He has no cervical adenopathy.   Neurological: He is alert.   Goofy. Oriented to place and person, but not to time or circumstances.    Skin: Skin is warm and dry. No rash noted. He is not diaphoretic. No erythema.   Psychiatric: He has a normal mood and affect. His behavior is normal. Thought content normal.   Vitals reviewed.        Recent Labs      04/27/18 0429 04/28/18   0004   WBC  10.6  8.5   RBC  2.72*  2.82*   HEMOGLOBIN  8.5*  9.0*   HEMATOCRIT  27.2*  28.3*   MCV  100.0*  100.4*   MCH  31.3  31.9   MCHC  31.3*  31.8*   RDW  57.9*  58.0*   PLATELETCT  287  354   MPV  10.2  10.1     Recent Labs      04/27/18 0429 04/28/18   0004  04/28/18   0544  04/28/18   1152   SODIUM  144   --   144   --    --    POTASSIUM  3.5*   < >  3.8  3.6  3.8   CHLORIDE  103   --   101   --    --    CO2  29   --   31   --    --    GLUCOSE  109*   --   138*   --    --    BUN  46*   --   54*   --    --    CREATININE  2.29*   --   2.88*   --    --    CALCIUM  9.0   --   9.0   --    --     < > = values in this interval not displayed.                      Assessment/Plan     * Acute encephalopathy due to seizures- (present on admission)   Assessment & Plan    - MRI negative. EEG consistent with seizures. Improved after extubation. Some confusion with IV morphine.   - continue keppra for seizures.   - D/C IV morphine.   - Continue to monitor closely.        Seizure (HCC)- (present on admission)   Assessment & Plan    - MRI negative.  No further seizures.   - continue IV keppra for now until able to take PO.   - neurology following.   - continue to monitor for seizures. Maintain on seizure precautions.         Shock circulatory (HCC)- (present on admission)   Assessment & Plan     - needed pressors. Resolved. Does not appear to be septic. Suspect hypovolemic shock.   - Continue to monitor hemodynamics closely.        NSVT (nonsustained ventricular tachycardia) (HCC)   Assessment & Plan    -continue to monitor on telemetry. Keep K~4, Mg~2.         Dysphagia   Assessment & Plan    - SLP recommended NTFL. Continue cortrak and tube feeding for now until with adequate PO intake. No ileus/obstruction on x-ray, advance tube feeding per ID recommendations.  - Await SNF placement. Will need continued speech/swallow therapies.        Metabolic acidosis- (present on admission)   Assessment & Plan    - resolved.   - continue to monitor.         Ileus (HCC)   Assessment & Plan    - Resolved on repeat AXR. Tolerating advancing tube feeding.  - Monitor for recurrence.        Acute renal failure superimposed on stage 3 chronic kidney disease (HCC)- (present on admission)   Assessment & Plan    - required hemodialysis. Defer to nephrology if will need further dialysis. Continue to monitor renal function. BMP in AM.        Anemia- (present on admission)   Assessment & Plan    - Patient had drop in his hemoglobin. Unclear etiology, suspect anemia of renal/chronic disease. Does not appear to have significant GI bleed. CT scan of the abdomen and pelvis 4/20/2018 did not reveal evidence of retroperitoneal hemorrhage.  - Hgb remains stable. Monitor.         Acute respiratory failure with hypoxia, due to encephalopathy, possible pneumonia (HCC)- (present on admission)   Assessment & Plan    - required intubation. Extubated on 4/25. Doing well post extubation.  - Completed 7 days course of Unasyn.  - Continue bronchiodilators per RT protocol. Continue oxygen  supplementation, will wean as able keep saturations above 88%.        Cerebral ataxia (HCC)- (present on admission)   Assessment & Plan    - chronic. Pt is wheelchair bound at baseline.   - Awaiting SNF placement. OR[place]O/IGNACIO on-board.        Diabetes mellitus type 2, controlled (CMS-HCC)- (present on admission)   Assessment & Plan    - Continue to hold metformin while in-house. Continue sliding scale insulin coverage, with Accu-Cheks.        Dyslipidemia- (present on admission)   Assessment & Plan    - Continue gemfibrozil.        HTN (hypertension)- (present on admission)   Assessment & Plan    - remains with fair control.   - Continue Norvasc 10 mg daily. Continue to hold lisinopril for now due to his renal failure. Continue to monitor blood pressure trend, with PRN IV hydralazine for significant hypertension.          Quality-Core Measures   Reviewed items::  Labs reviewed, Medications reviewed and Radiology images reviewed  Holland Catheter: discontinue Holland.  DVT prophylaxis pharmacological::  Heparin  Ulcer Prophylaxis::  Not indicated  Assessed for rehabilitation services:  Patient was assess for and/or received rehabilitation services during this hospitalization

## 2018-04-29 NOTE — PROGRESS NOTES
Patient resting quietly in bed, no S/S of distress, AA&Ox2. Denies SOB, chest pain, dizziness. Bed alarm on, call light within reach,  pt calls appropriately and does not get out of bed, wife at bedside. Bed in lowest position, bed locked, RN and CNA numbers provided, no further needs at this time. No changes from EPIC. Hourly rounding in place.

## 2018-04-29 NOTE — PROGRESS NOTES
Hospital Medicine Progress Note, Adult, Complex               Author: Wallace Rahman Date & Time created: 4/29/2018  11:53 AM     Interval History:  77 year old with CAR and now on dialysis who came into the hospital with slurred speech, and found to have a negative MRI. 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     Cr up. Feeling a bit dry. Na up to 148. Some obstructive sx.    Review of Systems:  Review of Systems   Constitutional: Negative for chills and fever.   Genitourinary: Positive for frequency.   All other systems reviewed and are negative.      Physical Exam:  Physical Exam   Constitutional: He appears well-developed and well-nourished.   HENT:   Head: Normocephalic and atraumatic.   Dry mouth   Cardiovascular: Normal rate and regular rhythm.    Pulmonary/Chest: Effort normal and breath sounds normal.   Skin: Skin is warm and dry.       Labs:        Invalid input(s): [id number]      Recent Labs      04/27/18 0429 04/28/18 0004 04/28/18   0544  04/28/18   1152  04/29/18   0747   SODIUM  144   --   144   --    --   148*   POTASSIUM  3.5*   < >  3.8  3.6  3.8  3.6   CHLORIDE  103   --   101   --    --   104   CO2  29   --   31   --    --   33   BUN  46*   --   54*   --    --   65*   CREATININE  2.29*   --   2.88*   --    --   3.28*   MAGNESIUM   --    --    --    --   2.6*   --    CALCIUM  9.0   --   9.0   --    --   9.0    < > = values in this interval not displayed.     Recent Labs      04/27/18 0429 04/28/18 0004 04/29/18   0747   ALTSGPT   --   37   --    ASTSGOT   --   35   --    ALKPHOSPHAT   --   363*   --    TBILIRUBIN   --   0.6   --    GLUCOSE  109*  138*  177*     Recent Labs      04/27/18 0429 04/28/18   0004  04/29/18   0448   RBC  2.72*  2.82*   --    HEMOGLOBIN  8.5*  9.0*   --    HEMATOCRIT  27.2*  28.3*   --    PLATELETCT  287  354   --    IRON   --    --   36*   FERRITIN   --    --   127.3   TOTIRONBC   --    --   319     Recent Labs      04/27/18    0429  18   0004   WBC  10.6  8.5   NEUTSPOLYS  80.60*  79.10*   LYMPHOCYTES  10.60*  11.20*   MONOCYTES  5.70  6.90   EOSINOPHILS  1.80  1.60   BASOPHILS  0.60  0.40   ASTSGOT   --   35   ALTSGPT   --   37   ALKPHOSPHAT   --   363*   TBILIRUBIN   --   0.6           Hemodynamics:  Temp (24hrs), Av.1 °C (98.7 °F), Min:36.8 °C (98.3 °F), Max:37.4 °C (99.3 °F)  Temperature: 36.9 °C (98.4 °F)  Pulse  Av.1  Min: 37  Max: 85   Blood Pressure : 148/51    Respiratory:    Respiration: 18, Pulse Oximetry: 94 %     Work Of Breathing / Effort: Mild  RUL Breath Sounds: Clear, RML Breath Sounds: Diminished, RLL Breath Sounds: Diminished, CATALINA Breath Sounds: Clear, LLL Breath Sounds: Diminished  Fluids:    Intake/Output Summary (Last 24 hours) at 18 1153  Last data filed at 18 0850   Gross per 24 hour   Intake              340 ml   Output              825 ml   Net             -485 ml     Weight: 84.1 kg (185 lb 6.5 oz)  GI/Nutrition:  Orders Placed This Encounter   Procedures   • DIET ORDER     Standing Status:   Standing     Number of Occurrences:   1     Order Specific Question:   Diet:     Answer:   Diabetic [3]     Comments:   No tomato based products due to severe GERD     Order Specific Question:   Diet:     Answer:   Full Liquid [11]     Order Specific Question:   Consistency/Fluid modifications:     Answer:   Nectar Thick [2]     Order Specific Question:   Miscellaneous modifications:     Answer:   SLP - 1:1 Supervision by Nursing [21]     Medical Decision Making, by Problem:  Active Hospital Problems    Diagnosis   • *Encephalopathy [G93.40]   • Shock circulatory (CMS-[facility]) [R57.9]   • Acute respiratory failure (CMS-[facility]) [J96.00]   • Acute blood loss anemia [D62]   • Diabetes mellitus type 2, controlled (CMS-[facility]) [E11.9]   • Cerebral ataxia (CMS-[facility]) [G11.9]   • Hypothermia [T68.XXXA]   • HTN (hypertension) [I10]   • Acute on chronic kidney failure (CMS-[facility]) [N17.9, N18.9]   • GERD  (gastroesophageal reflux disease) [K21.9]   • Dyslipidemia [E78.5]     1. CAR - Start low dose IVF, mcarthur if needed, check bladder scan  2. Anemia - Will follow  3. Contraction alkalosis    -1/2 NS  -[place]  -HD if no improvement  -Will follow  Quality-Core Measures

## 2018-04-29 NOTE — PROGRESS NOTES
Monitor summary: .14/.10/.36 SR 66-89    O PVCs  F PACs  R trigeminal PVCs  4 beats VT  R bursts of SVT

## 2018-04-29 NOTE — CARE PLAN
Problem: Safety  Goal: Will remain free from falls  Outcome: PROGRESSING AS EXPECTED  Fall precautions in place: treaded slipper socks on, mobility signs posted, hourly rounding, bed in lowest position, belongings and call light are within reach, bed alarm on, and family at bedside.    Problem: Respiratory:  Goal: Respiratory status will improve  Outcome: PROGRESSING AS EXPECTED  On 3L NC, sats above 90%. Silicone NC in place to protect skin. Patient verbalizes understanding of oxygen and other therapeutic interventions. Patient participates in procedures to optimize oxygenation and in management regimen within level of capability/condition. Patient displays no symptoms of respiratory distress.      Problem: Skin Integrity  Goal: Risk for impaired skin integrity will decrease  Outcome: PROGRESSING AS EXPECTED  Q2 turns in place. Waffle mattress in place. Pt educated about the importance of frequent repositioning to prevent skin breakdown. Encouraged turning in bed and notifying staff for help. Pt verbalized understanding. Appropriate dressings in place. Extra pillows in place for comfort, between knees, and to float heels. Barrier cream applied as appropriate. Pt cleaned and linens changed frequently as appropriate.

## 2018-04-29 NOTE — PROGRESS NOTES
Cortrak Placement    Tube Team verified patient name and medical record number prior to tube placement.  Cortrak tube (55 inches, 10 Armenian) placed at 91 cm in right nare.  Per Cortrak picture, tube appears to be in the small bowel.  Nursing Instructions: Awaiting KUB to confirm placement before use for medications or feeding. Once placement confirmed, flush tube with 30 ml of water, and then remove and save stylet, in patient medication drawer.

## 2018-04-30 PROBLEM — E87.0 HYPERNATREMIA: Status: ACTIVE | Noted: 2018-01-01

## 2018-04-30 NOTE — PROGRESS NOTES
Dr. Bryson paged and updated on patient being agitated and pulling at tubes, confusion and yelling out. One time dose 1mg xanax ordered.

## 2018-04-30 NOTE — DISCHARGE PLANNING
Received call from Ana Boles at [facility], they have no OT or Speech, and limited Dialysis service.  Call transferred to [facility].

## 2018-04-30 NOTE — EEG PROGRESS NOTE
EEG 04/30/18 11:24 AM    ROUTINE ELECTROENCEPHALOGRAM REPORT      NAME: Jose Connor    REFERRING Dr:    INDICATION: Altered mentation       TECHNIQUE: 30 channel routine electroencephalogram (EEG) was performed in accordance with the international 10-20 system. The study was reviewed in bipolar and referential montages. The recording examined the patient during wakeful and drowsy state(s).     DESCRIPTION OF THE RECORD:        Background rhythm during awake stage shows well-organized, well-developed, average voltage 7 to 9 hertz alpha activity in the posterior regions.  It blocks with eye opening and it is bilaterally synchronous and symmetrical.  No spike-and-wave discharges but there are lateralizing delta abnormalities over frontal ( L>R) are seen.  Photic stimulation did not produce any abnormalities. Stage I sleep was achieved.      ACTIVATION PROCEDURES:      Photic Stimulation were done    ICTAL AND/OR INTERICTAL FINDINGS:     No spike-and-wave discharges but there are lateralizing delta abnormalities over frontal ( L>R) are seen. No clinical events or seizures were reported or recorded during the study.      EKG: sampling of the EKG recording demonstrated arrhythm.        INTERPRETATION:    ________________________________________________________________________     This scalp EEG is consistent with                  1. Diffuse nonspecific cortical dysfunction - mild degree                 2. Focal cortical dysfunction and irritability over frontal -- L>R this time, -- interictal of focal seizures remains possible     There are no electrographic seizures in this short record though     Advise continue anti-seizure medication and follow up in outpatient by his future neurologist         ________________________________________________________________________

## 2018-04-30 NOTE — PROGRESS NOTES
[facility] Hospitalist Progress Note    Date of Service: 4/30/2018    Chief Complaint  77 y.o. male with cerebral ataxia, type II diabetes mellitus, hypertension, history of gout, GERD, admitted 4/19/2018 with altered mentation, and woke up with right-sided facial droop, and slurred speech, seen by a local physician and felt to have Bell's palsy and was sent home, but subsequently developed altered mentation and bradycardia. Found to have elevated troponin. Cardiology felt that this bradycardia was due to metabolic derangements, with  Type II NSTEMI. Was very agitated, and hypoxic requiring intubation with mechanical ventilation. He was also hypotensive, and required fluids and pressors. He had an MRI of the brain which was negative for acute process. EEG was done which is suggestive of seizure activity, and started on Keppra. Extubated on 425. Also developed progressive acute kidney injury, with hyperkalemia and hypercalcemia, and subsequently required dialysis. He had possible pneumonia, and completed 7 days of Unasyn. Also had acute blood loss anemia with unclear source, as no evidence of GI loss. CT of the abdomen and pelvis showed no retroperitoneal hemorrhage or ischemic bowel. Also developed ileus, requiring NG, which subsequently resolved.  Iron low, with normal ferritin consistent with ACD. B12, folate normal, and increased reticulocyte count. Reevaluated by speech therapy, recommending nectar thick full liquid diet. Holland catheter removed, but patient needed intermittent straight cath.      Interval Problem Update  4/30/2018 - no overnight events except for confusion and agitation overnight. Remains hemodynamically stable and afebrile. Stable on 3L O2 NC. No leukocytosis. Hgb 7.7. Na 147. K normal. Crea 3.01. Nephrology restarted half normal saline IV fluids. No BM since 4/26.    > Seen and examined. Still confused. Answers questions but non-sensical speech. Restless.          Consultants/Specialty  PMA  Nephrology  Neurology  Cardiology    Disposition  Monitor on telemetry. Await SNF placement.         ROS     Unable to obtain due to delirium/confusion.        Physical Exam  Laboratory/Imaging   Hemodynamics  Temp (24hrs), Av.9 °C (98.5 °F), Min:36.7 °C (98.1 °F), Max:37.3 °C (99.1 °F)   Temperature: 37 °C (98.6 °F)  Pulse  Av.3  Min: 37  Max: 85    Blood Pressure : 159/69     Respiratory      Respiration: 18, Pulse Oximetry: 97 %     Work Of Breathing / Effort: Mild  RUL Breath Sounds: Clear, RML Breath Sounds: Diminished, RLL Breath Sounds: Diminished, CATALINA Breath Sounds: Clear, LLL Breath Sounds: Diminished    Fluids    Intake/Output Summary (Last 24 hours) at 18 0725  Last data filed at 18 1800   Gross per 24 hour   Intake             1765 ml   Output                0 ml   Net             1765 ml       Nutrition  Orders Placed This Encounter   Procedures   • DIET ORDER     Standing Status:   Standing     Number of Occurrences:   1     Order Specific Question:   Diet:     Answer:   Diabetic [3]     Comments:   No tomato based products due to severe GERD     Order Specific Question:   Diet:     Answer:   Full Liquid [11]     Order Specific Question:   Consistency/Fluid modifications:     Answer:   Nectar Thick [2]     Order Specific Question:   Miscellaneous modifications:     Answer:   SLP - 1:1 Supervision by Nursing [21]     Physical Exam   Constitutional: He appears well-developed. No distress.   HENT:   Head: Normocephalic and atraumatic.   Mouth/Throat: Oropharynx is clear and moist. No oropharyngeal exudate.   Cortrak in place   Eyes: EOM are normal. Pupils are equal, round, and reactive to light. Right eye exhibits no discharge. Left eye exhibits no discharge. No scleral icterus.   Neck: Normal range of motion. Neck supple. No thyromegaly present.   Cardiovascular: Normal rate and regular rhythm.  Exam reveals no gallop and no friction rub.    No  murmur heard.  Pulmonary/Chest: Effort normal. He has no wheezes. He has no rales. He exhibits no tenderness.   Diminished air entry, CTA   Abdominal: Soft. Bowel sounds are normal. There is no tenderness. There is no rebound and no guarding.   Good BS, soft, nontender   Musculoskeletal: Normal range of motion. He exhibits no edema or tenderness.   Lymphadenopathy:     He has no cervical adenopathy.   Neurological: He is alert. No cranial nerve deficit.   confused. Restless   Skin: Skin is warm and dry. No rash noted. He is not diaphoretic. No erythema.   Psychiatric:   Unable to assess due to confusion/delirium   Vitals reviewed.      Recent Labs      04/28/18   0004   WBC  8.5   RBC  2.82*   HEMOGLOBIN  9.0*   HEMATOCRIT  28.3*   MCV  100.4*   MCH  31.9   MCHC  31.8*   RDW  58.0*   PLATELETCT  354   MPV  10.1     Recent Labs      04/28/18   0004  04/28/18   0544  04/28/18   1152  04/29/18   0747   SODIUM  144   --    --   148*   POTASSIUM  3.8  3.6  3.8  3.6   CHLORIDE  101   --    --   104   CO2  31   --    --   33   GLUCOSE  138*   --    --   177*   BUN  54*   --    --   65*   CREATININE  2.88*   --    --   3.28*   CALCIUM  9.0   --    --   9.0                      Assessment/Plan     * Acute encephalopathy due to seizures, delirium- (present on admission)   Assessment & Plan    - MRI negative. Initial EEG consistent with seizures. Improved after extubation, but then developed confusion with IV morphine, suspect delirium.   - continue keppra for seizures. Repeat EEG to reevaluate for inprovement in seizures.   - Morphine discontinued. Avoid narcotics, and minimize benzos. Start PRN haldol for agitation/combativeness. I have discussed with the wife about possible side effects of Haldol including permanent movement disorders /TD. The wife consented to PRN IM Haldol as deemed appropriate.  - Continue to monitor closely. Frequent re-orientation, reestablish circadian rhythm, encourage familiar faces/family in room,  avoid or minimize narcotics/sedatives.           Seizure (HCC)- (present on admission)   Assessment & Plan    - MRI negative. No further seizures.   - continue IV keppra for now until able to take PO consistently. Repeat EEG given his fluctuating delirium.  - neurology following.   - continue to monitor for seizures. Maintain on seizure precautions.         Shock circulatory (HCC)- (present on admission)   Assessment & Plan     - needed pressors. Resolved. Does not appear to be septic. Suspect hypovolemic shock.   - Continue to monitor hemodynamics closely.        Hypernatremia   Assessment & Plan    - continue 1/2 NS at 100cc/hr. Continue free water with tube feeding. BMP in AM.        NSVT (nonsustained ventricular tachycardia) (HCC)   Assessment & Plan    -continue to monitor on telemetry. Keep K~4, Mg~2.         Dysphagia   Assessment & Plan    - SLP recommended NTFL. Continue cortrak and tube feeding for now until with adequate PO intake. No ileus/obstruction on x-ray.   - Await SNF placement. Will need continued speech/swallow therapies.        Metabolic acidosis- (present on admission)   Assessment & Plan    - resolved.   - continue to monitor.         Ileus (HCC)   Assessment & Plan    - Resolved on repeat AXR. Has good bowel sounds, although complaining of abdominal pain. Tolerating advancing tube feeding.  - Repeat abdominal x-ray to reevaluate for recurrence.        Acute renal failure superimposed on stage 3 chronic kidney disease (HCC)- (present on admission)   Assessment & Plan    - required hemodialysis. Defer to nephrology re: need for further dialysis.   - giving gentle IVF with 1/2 NS for hypernatremia.   - Continue to monitor renal function. BMP in AM.        Anemia- (present on admission)   Assessment & Plan    - Patient had drop in his hemoglobin. Unclear etiology, but iron studies consistent with anemia of renal/chronic disease. Does not appear to have significant GI bleed. CT scan of the  abdomen and pelvis 4/20/2018 did not reveal evidence of retroperitoneal hemorrhage.  - Continue to monitor. Restrictive transfusion strategies, transfuse if hemoglobin less than 7.        Acute respiratory failure with hypoxia, due to encephalopathy, possible pneumonia (HCC)- (present on admission)   Assessment & Plan    - required intubation. Extubated on 4/25. Doing well post extubation.  - Completed 7 days course of Unasyn.  - Continue bronchiodilators per RT protocol. Continue oxygen supplementation, wean as able keep saturations above 88%.        Cerebral ataxia (HCC)- (present on admission)   Assessment & Plan    - chronic. Pt is wheelchair bound at baseline.   - [facility] working on SNF placement in [place], [place].        Diabetes mellitus type 2, controlled (CMS-HCC)- (present on admission)   Assessment & Plan    - Continue to hold metformin while in-house. Continue sliding scale insulin coverage, with Accu-Cheks.        Dyslipidemia- (present on admission)   Assessment & Plan    - Continue gemfibrozil.        HTN (hypertension)- (present on admission)   Assessment & Plan    - good control.   - Continue Norvasc 10 mg daily. Continue to hold lisinopril for now due to his renal failure. Continue to monitor blood pressure trend, with PRN IV hydralazine for significant hypertension.          Quality-Core Measures   Reviewed items::  Labs reviewed, Medications reviewed and Radiology images reviewed  Holland Catheter: discontinue Holland.  DVT prophylaxis pharmacological::  Heparin  Ulcer Prophylaxis::  Not indicated  Assessed for rehabilitation services:  Patient was assess for and/or received rehabilitation services during this hospitalization

## 2018-04-30 NOTE — PROGRESS NOTES
Dr. Bryson paged and updated on patient continuing to try to get out of bed and being very agitated. Orders received for 1mg ativan IV one time dose.

## 2018-04-30 NOTE — DISCHARGE PLANNING
Anticipated Discharge Disposition: SNF    Action: LSW spoke with Ana Collier from [facility] and facility is concerned that the pt needs a higher LOC then they can provide.  LSW faxed updated notes to [phone].  Currently the facility cannot provide ST and OT.      LSW met with spouse at bedside who stated she will not go to another facility other that [facility]. LSW discussed other avenues for SNF in [place] and surrounding areas, spouse remains unwilling to expand referral.  Spouse wants to see pt's progress over the next couple of days and take pt home with [facility] if mobility improves. LSW attempted to discuss with spouse care at home - spouse refuses to discuss options at this time.  LSW will address tomorrow.    PASRR [id number]    Barriers to Discharge: Spouse refuses to take pt to any other SNF.    Plan: LSW to monitor pt's progress for mobility and improvement.  Plan to meet with spouse tomorrow to address DC planning.

## 2018-04-30 NOTE — PROGRESS NOTES
RN notified SW of wife's request to transport to [facility] swing bed. SW aware that wife is in waiting room as pt is currently getting an EEG. RN notified SW that pt is not medically cleared today for dc.

## 2018-04-30 NOTE — PROGRESS NOTES
Assumed pt care. Pt is sleeping. Pt is A&O x2. Wife present at the bedside. Discussed plan of care for today. Questions answered and concerns addressed. White board updated. Fall precautions in place. Will continue to monitor for safety and comfort.

## 2018-04-30 NOTE — CARE PLAN
Problem: Nutritional:  Goal: Nutrition support tolerated and meeting greater than 85% of estimated needs  Outcome: MET Date Met: 04/30/18  Diabetisource AC @ 65 mL/hr

## 2018-04-30 NOTE — CARE PLAN
Problem: Safety  Goal: Will remain free from injury  Outcome: PROGRESSING AS EXPECTED  Fall precautions in place. Room closest to the nursing station. Non-skid socks in place. Assistive devices out of sight. Family educated to use call light for assitance.     Problem: Skin Integrity  Goal: Risk for impaired skin integrity will decrease  Outcome: PROGRESSING AS EXPECTED  Pt is on q2hr turns. Barrier cream and barrier wipes used.     Problem: Pain Management  Goal: Pain level will decrease to patient's comfort goal  Outcome: PROGRESSING AS EXPECTED

## 2018-04-30 NOTE — DISCHARGE PLANNING
Received call from Ana Boles at [facility], she has concerns about patients required level of care [place]  Per Ana Boles they can't accept a Core track and have limited PT/OT.  CCT Marcie advised.  Left message for return call with Ana Boles.

## 2018-04-30 NOTE — DISCHARGE PLANNING
Care Transition Team Assessment    LSW conducted an assessment (see below).  LSW met with spouse today who is anxious to return home with pt.  LSW provided wife of a copy of POA per request. Spouse Yael Connor is the POA for pt.  Pt lives in a one story home in [place], [place].  Pt has been WC bound for the past year. Pt's home is well equipped with a ramp to get into house and bars to help pt transfer.  Pt has a good support network consisting of three children and spouse.      Anticipate DC to SNF, acceptance is pending.      Information Source  Orientation : Unable to Assess  Information Given By: Spouse  Informant's Name: Yael Connor  Who is responsible for making decisions for patient? : Spouse  Name(s) of Primary Decision Maker: Yael Connor    Readmission Evaluation  Is this a readmission?: No    Elopement Risk  Legal Hold: No  Ambulatory or Self Mobile in Wheelchair: No-Not an Elopement Risk  Elopement Risk: Not at Risk for Elopement    Interdisciplinary Discharge Planning  Lives with - Patient's Self Care Capacity: Spouse  Patient or legal guardian wants to designate a caregiver (see row info): No  Housing / Facility: 1 Story [place]  Prior Services: Intermittent Physical Support for ADL Per Family    Discharge Preparedness  What is your plan after discharge?: Skilled nursing facility  What are your discharge supports?: Child, Spouse  Prior Functional Level: Independent with Activities of Daily Living  Difficulity with ADLs: Walking    Functional Assesment  Prior Functional Level: Independent with Activities of Daily Living    Finances  Financial Barriers to Discharge: No  Prescription Coverage: Yes    Vision / Hearing Impairment  Vision Impairment : Yes  Right Eye Vision: Impaired, Wears Glasses  Left Eye Vision: Impaired, Wears Glasses  Hearing Impairment : No    Values / Beliefs / Concerns  Values / Beliefs Concerns : No    Advance Directive  Advance Directive?: DPOA for Health Care  Durable Power  of  Name and Contact : Yael Connor    Domestic Abuse  Have you ever been the victim of abuse or violence?: No  Physical Abuse or Sexual Abuse: No  Verbal Abuse or Emotional Abuse: No  Possible Abuse Reported to:: Not Applicable    Psychological Assessment  History of Substance Abuse: None  History of Psychiatric Problems: No    Discharge Risks or Barriers  Discharge risks or barriers?: No    Anticipated Discharge Information  Anticipated discharge disposition: SNF

## 2018-04-30 NOTE — PROCEDURES
DATE OF SERVICE:  04/30/2018    This is an inpatient EEG that was done on 04/30/2018.    ROUTINE ELECTROENCEPHALOGRAM REPORT        NAME: Jose Hansenpritesh     REFERRING Dr:     INDICATION: Altered mentation         TECHNIQUE: 30 channel routine electroencephalogram (EEG) was performed in accordance with the international 10-20 system. The study was reviewed in bipolar and referential montages. The recording examined the patient during wakeful and drowsy state(s).      DESCRIPTION OF THE RECORD:           Background rhythm during awake stage shows well-organized, well-developed, average voltage 7 to 9 hertz alpha activity in the posterior regions.  It blocks with eye opening and it is bilaterally synchronous and symmetrical.  No spike-and-wave discharges but there are lateralizing delta abnormalities over frontal ( L>R) are seen.  Photic stimulation did not produce any abnormalities. Stage I sleep was achieved.        ACTIVATION PROCEDURES:       Photic Stimulation were done     ICTAL AND/OR INTERICTAL FINDINGS:     No spike-and-wave discharges but there are lateralizing delta abnormalities over frontal ( L>R) are seen. No clinical events or seizures were reported or recorded during the study.       EKG: sampling of the EKG recording demonstrated arrhythm.          INTERPRETATION:     ________________________________________________________________________     This scalp EEG is consistent with                  1. Diffuse nonspecific cortical dysfunction - mild degree                 2. Focal cortical dysfunction and irritability over frontal -- L>R this time, -- interictal of focal seizures remains possible     There are no electrographic seizures in this short record though     Advise continue anti-seizure medication and follow up in outpatient by his future neurologist          ________________________________________________________________________                           ____________________________________      MD EVELIN FERGUSON / JERICHO    DD:  04/30/2018 12:16:29  DT:  04/30/2018 12:34:39    D#:  [id number]  Job#:  [id number]

## 2018-05-01 NOTE — DISCHARGE PLANNING
Medical SW    Referral: Sw met w/ palliative RN Mickey.    RN spoke to pt's wife. They are from [place], [place] and would like pt to d/c to [facility] hospice bed. Wife, Yael, indicates either [facility] or [facility] w/ the SNF should be point of contact.      Sw called and left  w/ Josie at [phone].    Sw spoke to bedside RN. Pt's wife wants pt to d/c to above facility. RN indicates pt is reportedly usually w/c bound, A/O and independent w/ feeding and personal care.    Right now pt is exhibiting confusion and lethargy. Pt needs O2 supplementation and due to aspiration risk is receiving meds and food via cortrak through nare.       Plan: Sw to assist w/ d/c planning as needed.

## 2018-05-01 NOTE — CODE DOCUMENTATION
Per Dr. Rosales decrease IV fluids to 50 cc/h. Hold tube feeding and free water flushes tonight. Verbalizes possible need of restraints. Transfer to ICU for increased monitoring.

## 2018-05-01 NOTE — PROGRESS NOTES
After speaking with MD Bryson and rapid response RNs, rapid response was called on patient because as he was being suctioned he desaturated into 80s and began having periods of holding his breath.

## 2018-05-01 NOTE — PROGRESS NOTES
Nephrology Progress Note, Adult, Complex               Author: Norma Alanisfrancialexei Date & Time created: 4/30/2018  5:45 PM     Interval History:  77 year old with CAR and now on dialysis who came into the hospital with slurred speech, and found to have a negative MRI. 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   Holding dialysis -hoping for recovery  Good UOP  Creat better  Review of Systems:  Review of Systems   Reason unable to perform ROS: confused.       Physical Exam:  Physical Exam   Constitutional: He appears well-developed and well-nourished. No distress.   HENT:   Head: Normocephalic and atraumatic.   Dry mucosa   Eyes: Conjunctivae and EOM are normal. Pupils are equal, round, and reactive to light.   Neck: Normal range of motion. Neck supple. No thyromegaly present.   Cardiovascular: Normal rate and regular rhythm.  Exam reveals no gallop and no friction rub.    Pulmonary/Chest: Effort normal. No respiratory distress. He has no wheezes. He has rales.   Abdominal: Soft. Bowel sounds are normal. He exhibits distension.   Musculoskeletal: He exhibits edema.   Neurological:   confused   Skin: Skin is warm and dry.       Labs:        Invalid input(s): [id number]      Recent Labs      04/28/18   0004   04/28/18   1152  04/29/18   0747  04/30/18   0923   SODIUM  144   --    --   148*  147*   POTASSIUM  3.8   < >  3.8  3.6  3.8   CHLORIDE  101   --    --   104  106   CO2  31   --    --   33  33   BUN  54*   --    --   65*  63*   CREATININE  2.88*   --    --   3.28*  3.01*   MAGNESIUM   --    --   2.6*   --    --    CALCIUM  9.0   --    --   9.0  8.7    < > = values in this interval not displayed.     Recent Labs      04/28/18   0004  04/29/18   0747  04/30/18   0923   ALTSGPT  37   --    --    ASTSGOT  35   --    --    ALKPHOSPHAT  363*   --    --    TBILIRUBIN  0.6   --    --    GLUCOSE  138*  177*  171*     Recent Labs      04/28/18   0004  04/29/18   0448  04/30/18   0923   RBC  2.82*   --   2.37*    HEMOGLOBIN  9.0*   --   7.7*   HEMATOCRIT  28.3*   --   24.0*   PLATELETCT  354   --   419   IRON   --   36*   --    FERRITIN   --   127.3   --    TOTIRONBC   --   319   --      Recent Labs      18   0004  18   0923   WBC  8.5  8.5   NEUTSPOLYS  79.10*  79.60*   LYMPHOCYTES  11.20*  12.20*   MONOCYTES  6.90  6.10   EOSINOPHILS  1.60  1.10   BASOPHILS  0.40  0.20   ASTSGOT  35   --    ALTSGPT  37   --    ALKPHOSPHAT  363*   --    TBILIRUBIN  0.6   --            Hemodynamics:  Temp (24hrs), Av.2 °C (98.9 °F), Min:36.7 °C (98.1 °F), Max:37.6 °C (99.7 °F)  Temperature: 37.3 °C (99.2 °F)  Pulse  Av.4  Min: 37  Max: 85   Blood Pressure : 127/65    Respiratory:    Respiration: 18, Pulse Oximetry: 95 %     Work Of Breathing / Effort: Mild  RUL Breath Sounds: Clear, RML Breath Sounds: Diminished, RLL Breath Sounds: Diminished, CATALINA Breath Sounds: Clear, LLL Breath Sounds: Diminished  Fluids:    Intake/Output Summary (Last 24 hours) at 18 1745  Last data filed at 18 1600   Gross per 24 hour   Intake             2015 ml   Output             1510 ml   Net              505 ml     Weight: 85.4 kg (188 lb 4.4 oz)  GI/Nutrition:  Orders Placed This Encounter   Procedures   • DIET ORDER     Standing Status:   Standing     Number of Occurrences:   1     Order Specific Question:   Diet:     Answer:   Diabetic [3]     Comments:   No tomato based products due to severe GERD     Order Specific Question:   Diet:     Answer:   Full Liquid [11]     Order Specific Question:   Consistency/Fluid modifications:     Answer:   Nectar Thick [2]     Order Specific Question:   Miscellaneous modifications:     Answer:   SLP - 1:1 Supervision by Nursing [21]     Medical Decision Making, by Problem:  Active Hospital Problems    Diagnosis   • *Encephalopathy [G93.40]   • Shock circulatory (CMS-HCC) [R57.9]   • Acute respiratory failure (CMS-HCC) [J96.00]   • Acute blood loss anemia [D62]   • Diabetes mellitus type 2,  controlled (CMS-HCC) [E11.9]   • Cerebral ataxia (CMS-HCC) [G11.9]   • Hypothermia [T68.XXXA]   • HTN (hypertension) [I10]   • Acute on chronic kidney failure (CMS-HCC) [N17.9, N18.9]   • GERD (gastroesophageal reflux disease) [K21.9]   • Dyslipidemia [E78.5]     1. CAR - Start low dose IVF, mcarthur if needed, check bladder scan  2. Anemia - Will follow  3. Contraction alkalosis  4. Hypernatremia -add free water flushes    -[place]  -HD if no improvement  -Will follow  Quality-Core Measures   Reviewed items::  Labs reviewed and Medications reviewed

## 2018-05-01 NOTE — PROGRESS NOTES
Abd xray shows mild ileus. Dr. Echeverria notified. Pt is tolerating TF evidenced by no residuals therefore MD would like to continue TF and free water flushes. Also pt had large BM today and has active bowel sounds in all 4 quadrants.

## 2018-05-01 NOTE — PROGRESS NOTES
[facility] Hospitalist Progress Note    Date of Service: 2018    Chief Complaint  77 y.o. male admitted 2018 with altered mentation.    Interval Problem Update  Patient became very agitated as well as hypoxic, required intubation.  Extubated on .  Patient continued to be altered, EEG was obtained which showed likely seizure activity, is now on Keppra.  Patient continues to be altered, was very agitated overnight, difficulty sleeping.  MRI was obtained, no acute process.  Patient also developed worsening kidney function, did require hemodialysis but has not for days.  Patient was also diagnosed with pneumonia, finished full course of antibiotics.  Pt seen and examined in the ICU, ICU care given.  Discussed patient condition and plan with Intensivist, RN, RT and charge nurse / hot rounds.    Higher level of care yesterday for agitation  Ativan overnight, slept  Not alert or oriented  -160  tmax 100.4  BM yesterday  TF stopped, concern for aspiration  800 uop via mcarthur overnight  HD cath  Not mobilized  4L NC    Consultants/Specialty  Intensivist  Nephrology  Neurology  Cardiology  Palliative care    Disposition  Patient requires additional treatment hospital, will need placement at the time of discharge        Review of Systems   Unable to perform ROS: Mental acuity        Unable to obtain due to delirium/confusion.        Physical Exam  Laboratory/Imaging   Hemodynamics  Temp (24hrs), Av.3 °C (99.1 °F), Min:36.7 °C (98.1 °F), Max:38 °C (100.4 °F)   Temperature: 36.8 °C (98.2 °F)  Pulse  Av.5  Min: 37  Max: 100 Heart Rate (Monitored): 77  Blood Pressure : 122/53, NIBP: 129/62     Respiratory      Respiration: 17, Pulse Oximetry: 97 %     Work Of Breathing / Effort: Mild  RUL Breath Sounds: Coarse Crackles, RML Breath Sounds: Coarse Crackles, RLL Breath Sounds: Diminished, CATALINA Breath Sounds: Coarse Crackles, LLL Breath Sounds: Diminished    Fluids    Intake/Output Summary (Last 24 hours) at  05/01/18 0731  Last data filed at 05/01/18 0600   Gross per 24 hour   Intake             1090 ml   Output             3010 ml   Net            -1920 ml       Nutrition  Orders Placed This Encounter   Procedures   • DIET NPO     Standing Status:   Standing     Number of Occurrences:   1     Order Specific Question:   Restrict to:     Answer:   Strict [1]     Physical Exam   Constitutional: No distress.   HENT:   Head: Normocephalic and atraumatic.   Eyes: Right eye exhibits no discharge. Left eye exhibits no discharge.   Neck: Neck supple. No thyromegaly present.   Cardiovascular: Normal rate and regular rhythm.  Exam reveals distant heart sounds. Exam reveals no gallop and no friction rub.    No murmur heard.  Pulmonary/Chest: Effort normal and breath sounds normal. No stridor. No respiratory distress. He has no wheezes. He has no rales.   Diminished air entry, CTA   Abdominal: Soft. Bowel sounds are normal. He exhibits no distension.   Good BS, soft, nontender   Musculoskeletal: He exhibits no edema.   Neurological:   Somnolent, nonverbal    Skin: Skin is warm and dry. He is not diaphoretic.   Psychiatric: He is noncommunicative.   Vitals reviewed.      Recent Labs      04/30/18   0923  04/30/18   2330   WBC  8.5  11.2*   RBC  2.37*  2.41*   HEMOGLOBIN  7.7*  7.7*   HEMATOCRIT  24.0*  25.3*   MCV  101.3*  105.0*   MCH  32.5  32.0   MCHC  32.1*  30.4*   RDW  59.0*  61.5*   PLATELETCT  419  437   MPV  9.9  10.0     Recent Labs      04/29/18   0747  04/30/18   0923  04/30/18   2330   SODIUM  148*  147*  147*   POTASSIUM  3.6  3.8  3.9   CHLORIDE  104  106  104   CO2  33  33  31   GLUCOSE  177*  171*  158*   BUN  65*  63*  58*   CREATININE  3.28*  3.01*  2.75*   CALCIUM  9.0  8.7  8.8                      Assessment/Plan     * Acute encephalopathy due to seizures, delirium- (present on admission)   Assessment & Plan    - Persists, unknown cause at this time  - Patient did have a EEG concerning for seizures, now placed  on Keppra, remains altered, could possibly be due to Keppra  - Patient also had uremia, this is improved with hemodialysis but his mentation is still off  - Could've initially been due to sepsis however this is resolved  - Could be due to hospitalization  - Likely multifactorial  - Discuss with family at bedside, went to see how he progresses, considering hospice          Seizure ([facility])- (present on admission)   Assessment & Plan    -Continue Keppra  - No additional seizure-like activity        Shock circulatory ([facility])- (present on admission)   Assessment & Plan     - Resolved        Hypernatremia   Assessment & Plan    -Stable, continue free water flushes as able  - Repeat BMP in the morning        NSVT (nonsustained ventricular tachycardia) ([facility])   Assessment & Plan    - Monitor with telemetry  - Keep electrolytes normal        Dysphagia   Assessment & Plan    -Patient did pass swallowing evaluation with nectar thick liquids  - Patient also was on tube feeds, was tolerating mental overnight, concern for aspiration  - Patient has had tube feed-like substance suctioned with NT suctioning  - KUB from yesterday with a mild ileus, does have decreased bowel sounds  - Hold tube feeds overnight and reevaluate in the morning        Metabolic acidosis- (present on admission)   Assessment & Plan    - resolved        Ileus ([facility])   Assessment & Plan    -Noted again on KUB  - tube feeds held  - Reevaluate in the morning, restart tube feeds when able  - When tube feeds are restarted will initiate them at trickle rate        Acute renal failure superimposed on stage 3 chronic kidney disease ([facility])- (present on admission)   Assessment & Plan    - Has required hemodialysis, now holding hemodialysis per nephrology        Anemia- (present on admission)   Assessment & Plan    - No sign of gross bleeding  - Repeat CBC in the morning  - Hemoglobin is stable  - Transfuse as needed        Acute respiratory failure with hypoxia, due to  encephalopathy, possible pneumonia (HCC)- (present on admission)   Assessment & Plan    - Intubated 4/20, extubated 4/25  - Good sats on 4 L nasal cannula  - Additional recommendations for intensivist        Cerebral ataxia (HCC)- (present on admission)   Assessment & Plan    -Chronic, PT/OT when able        Diabetes mellitus type 2, controlled (CMS-HCC)- (present on admission)   Assessment & Plan    -Continue insulin sliding scale, adjust as needed        Dyslipidemia- (present on admission)   Assessment & Plan    -Takes gemfibrozil at home, continue        HTN (hypertension)- (present on admission)   Assessment & Plan    -Continue amlodipine  - Continue when necessary medication and adjust as needed          Quality-Core Measures   Reviewed items::  Labs reviewed, Medications reviewed and Radiology images reviewed  Holland Catheter: discontinue [place].  DVT prophylaxis pharmacological::  Heparin  Assessed for rehabilitation services:  Patient was assess for and/or received rehabilitation services during this hospitalization

## 2018-05-01 NOTE — DISCHARGE PLANNING
Medical SW    Sw attended AM IDT Rounds.    RN reports, pt admitted from Tele for agitation, unable to communicate, cortrak in place, mcarthur in palce, aspiration precaution, 4 LPM nasal canula,       Plan: Sw to assist w/ d/c planning as needed

## 2018-05-01 NOTE — THERAPY
SPEECH THERAPY CONTACT NOTE:     Per RN and EMR, patient was downgraded from NTFL diet to NPO with Cortrak d/t increased lethargy and concerns for aspiration. Per RN, hold dysphagia tx for today. Palliative care consulted as well. SLP will hold tx session and re-attempt as able and appropriate. Thank you.

## 2018-05-01 NOTE — CONSULTS
"Reason for PC Consult: Advance Care Planning    Assessment:  General:  77yr old male [facility] with change in mental status.  PMH of cerebellar ataxia, GERD, HTN, and Type 2 DM.  Notes reflect that the patient, who is wheelchair bound, had right-sided facial droop, and slurred speech, he was seen, MRI was ordered which showed no ischemia, DX Bell's Palsy.  Patient then discharged home and became more altered and unresponsive, taken to the ED and found to have elevated Troponin's and transferred to [facility] for a higher level of care.  Patient since has been intubated, consults from Nephro, Cardio and Neuro, dialysis initiated.  Rapid Response called @ 2055 4/30/18 for desaturation, patient \"gurgling\".  Spouse contacted/informed, patient transferred to CICU and she has elected DNR status.  Social-Patient resides in [place] with his spouse, Yael.  They have children and grandchildren in the area for support. Notes show previous history of tobacco use, no alcohol.  Dyspnea: Yes- 95% 5L/NC 30% FiO2  Last BM: 04/30/18- Per RN assessment  Pain: No-    Depression: Unable to determine-    Dementia: Unable to Determine;       Spiritual:  Is Sikhism or spirituality important for coping with this illness? No-    Has a  or spiritual provider visit been requested? No    Palliative Performance Scale: 10%    Advance Directive: Advance Directive, DPOA-    DPOA: Yes- Yael Connor, spouse [phone]  POLST: None on File    Code Status: DNR-  Changed today with Rapid Response/Full treat.    Outcome:  I met with the patient, spouse and grand-daughter at bedside.  I introduced myself and the role of Palliative Care in POC.  Patient's spouse stated that she needs assistance with transferring the patient to the [facility] for \"comfort\"; but wants therapy to work with him to get his swallowing back.   She states that she would like to get him closer to home so \"he can get out of this " "fog\", then she'll take him home with HH.  We discussed the \"what if\" he does not come out of his fog, Hospice care vs Home Health and her understanding of the current status. She stated understanding and said that the patient \"kept telling me to get him out of here\".  Ultimately she would like to transfer him closer to home to see family.    All questions were answered in full, stated understanding and agreed. Active listening, reflection, and empathic support utilized throughout this encounter. Contact for Palliative Care was provided and patient/family is encourage to call for questions, concerns and/or support.      Updated: GEOFF Barnard, Zee Jiménez SW    Plan: Transfer patient to [facility] for comfort measures.    Recommendations: Hospice referral to [facility]    Thank you for allowing Palliative Care to participate in this patient's care. Please feel free to call [phone] with any questions or concerns.    "

## 2018-05-01 NOTE — PROGRESS NOTES
Patient left floor via bed with rapid response RNs. Patient transferred to [place] [id number], patient taken off the tele box and monitor room notified. All patient belongings where with the patient.

## 2018-05-01 NOTE — PROGRESS NOTES
Received report from roly TELLEZ Autumn. Assumed care of patient. Patient is laying down in bed with spouse present at bedside. Patient A&O x0 at this time. Plan of care discussed with the spouse. Bed locked and in the lowest position with call light within reach.

## 2018-05-01 NOTE — PROGRESS NOTES
Nephrology Progress Note, Adult, Complex               Author: Norma Alanisfrancialexei Date & Time created: 5/1/2018  2:42 PM     Interval History:  77 year old with CAR and now on dialysis who came into the hospital with slurred speech, and found to have a negative MRI. 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   Transferred to ICU -AMS  Holding dialysis -hoping for recovery  Good UOP  Creat better  Review of Systems:  Review of Systems   Reason unable to perform ROS: altered mental status.       Physical Exam:  Physical Exam   Constitutional: He appears well-developed and well-nourished. No distress.   HENT:   Head: Normocephalic and atraumatic.   Dry mucosa   Eyes: Conjunctivae and EOM are normal. Pupils are equal, round, and reactive to light.   Neck: Normal range of motion. Neck supple. No thyromegaly present.   Cardiovascular: Normal rate and regular rhythm.  Exam reveals no gallop and no friction rub.    Pulmonary/Chest: Effort normal. No respiratory distress. He has no wheezes. He has rales.   Abdominal: Soft. Bowel sounds are normal. He exhibits distension.   Musculoskeletal: He exhibits edema.   Neurological:   lethargic   Skin: Skin is warm and dry.       Labs:  Recent Labs      04/30/18   2203   [id number]  7.46   [id number]  44.2*   [id number]  71.1   [id number]  92.9*   ARTHCO3  31*   ARTBE  6*         Recent Labs      04/29/18   0747  04/30/18   0923  04/30/18   2330   SODIUM  148*  147*  147*   POTASSIUM  3.6  3.8  3.9   CHLORIDE  104  106  104   CO2  33  33  31   BUN  65*  63*  58*   CREATININE  3.28*  3.01*  2.75*   CALCIUM  9.0  8.7  8.8     Recent Labs      04/29/18   0747  04/30/18   0923  04/30/18   2330   GLUCOSE  177*  171*  158*     Recent Labs      04/29/18   0448  04/30/18   0923  04/30/18   2330   RBC   --   2.37*  2.41*   HEMOGLOBIN   --   7.7*  7.7*   HEMATOCRIT   --   24.0*  25.3*   PLATELETCT   --   419  437   IRON  36*   --    --    FERRITIN  127.3   --    --    TOTIRONBC  319   --     --      Recent Labs      18   0923  18   2330   WBC  8.5  11.2*   NEUTSPOLYS  79.60*  80.90*   LYMPHOCYTES  12.20*  11.60*   MONOCYTES  6.10  5.50   EOSINOPHILS  1.10  0.80   BASOPHILS  0.20  0.80           Hemodynamics:  Temp (24hrs), Av.3 °C (99.1 °F), Min:36.7 °C (98.1 °F), Max:38 °C (100.4 °F)  Temperature: 36.8 °C (98.2 °F)  Pulse  Av.7  Min: 37  Max: 100Heart Rate (Monitored): 75  Blood Pressure : 122/53, NIBP: 143/64    Respiratory:    Respiration: 18, Pulse Oximetry: 94 %     Work Of Breathing / Effort: (P) Mild  RUL Breath Sounds: (P) Coarse Crackles, RML Breath Sounds: (P) Coarse Crackles, RLL Breath Sounds: (P) Diminished, CATALINA Breath Sounds: (P) Coarse Crackles, LLL Breath Sounds: (P) Diminished  Fluids:    Intake/Output Summary (Last 24 hours) at 18 1442  Last data filed at 18 1400   Gross per 24 hour   Intake              500 ml   Output             2205 ml   Net            -1705 ml     Weight: 83.3 kg (183 lb 10.3 oz)  GI/Nutrition:  Orders Placed This Encounter   Procedures   • DIET NPO     Standing Status:   Standing     Number of Occurrences:   1     Order Specific Question:   Restrict to:     Answer:   Strict [1]     Medical Decision Making, by Problem:  Active Hospital Problems    Diagnosis   • *Encephalopathy [G93.40]   • Shock circulatory (CMS-[facility]) [R57.9]   • Acute respiratory failure (CMS-[facility]) [J96.00]   • Acute blood loss anemia [D62]   • Diabetes mellitus type 2, controlled (CMS-[facility]) [E11.9]   • Cerebral ataxia (CMS-[facility]) [G11.9]   • Hypothermia [T68.XXXA]   • HTN (hypertension) [I10]   • Acute on chronic kidney failure (CMS-[facility]) [N17.9, N18.9]   • GERD (gastroesophageal reflux disease) [K21.9]   • Dyslipidemia [E78.5]     1. CAR - improving -to monitor  2. Anemia - drop in Hb level -to monitor  3.  HTN: BP well controlled  4. Hypernatremia -add free water flushes once feeding resumed    Recs:   -no need for dialysis -may d/c dialysis catheter  -Will  follow  Quality-Core Measures   Reviewed items::  Labs reviewed and Medications reviewed

## 2018-05-01 NOTE — PROGRESS NOTES
Paraphimosis present in patient, patient extremely restless and currently lethargic. Penis reduced back into foreskin succesful, patient noted to be calmer although still restless and lethargic. Dr. Arauz and Dr. Rowell aware of situation.

## 2018-05-01 NOTE — PROGRESS NOTES
Paged MD Bryson to discuss switching patients Haloperidol to IV instead of IM since patient is very agitated and tossing around too much to give IM. Discussed with the MD that patient sounds gurgly, per MD stop tube feed at this time and order respiratory protocol for possible suction and STAT CXR.  MD also increased Haloperidol from 1mg to 2mg at this time.

## 2018-05-02 NOTE — PROGRESS NOTES
[facility] Hospitalist Progress Note    Date of Service: 2018    Chief Complaint  77 y.o. male admitted 2018 with altered mentation.    Interval Problem Update  Patient became very agitated as well as hypoxic, required intubation.  Extubated on .  Patient continued to be altered, EEG was obtained which showed likely seizure activity, is now on Keppra.  Patient continues to be altered, was very agitated overnight, difficulty sleeping.  MRI was obtained, no acute process.  Patient also developed worsening kidney function, did require hemodialysis but has not for days.  Patient was also diagnosed with pneumonia, finished full course of antibiotics.  Discuss with family at bedside.  Pt seen and examined in the ICU, ICU care given.  Discussed patient condition and plan with Intensivist, RN, RT and charge nurse / hot rounds.    Extubated   Increased confusion and agitation overnight, ativan given  Yesterday was following some  NSR  -160  Afebrile  bm x 2 overnight  No tmobilizing  Adequate uop  4L NC  Start trickle TF at 20    Consultants/Specialty  Intensivist  Nephrology  Neurology  Cardiology  Palliative care    Disposition  Patient requires additional treatment hospital, will need placement at the time of discharge        Review of Systems   Unable to perform ROS: Mental acuity        Unable to obtain due to delirium/confusion.        Physical Exam  Laboratory/Imaging   Hemodynamics  Temp (24hrs), Av.5 °C (97.7 °F), Min:36.4 °C (97.6 °F), Max:36.5 °C (97.7 °F)   Temperature: 36.5 °C (97.7 °F)  Pulse  Av.5  Min: 37  Max: 100 Heart Rate (Monitored): 78  NIBP: 140/64     Respiratory      Respiration: 20, Pulse Oximetry: 97 %     Work Of Breathing / Effort: Mild  RUL Breath Sounds: Crackles, RML Breath Sounds: Crackles, RLL Breath Sounds: Diminished, CATALINA Breath Sounds: Crackles, LLL Breath Sounds: Diminished    Fluids    Intake/Output Summary (Last 24 hours) at 18 3423  Last data filed  at 05/02/18 0600   Gross per 24 hour   Intake              380 ml   Output             1700 ml   Net            -1320 ml       Nutrition  Orders Placed This Encounter   Procedures   • DIET NPO     Standing Status:   Standing     Number of Occurrences:   1     Order Specific Question:   Restrict to:     Answer:   Strict [1]     Physical Exam   Constitutional: He appears ill. No distress.   HENT:   Head: Normocephalic and atraumatic.   Mouth/Throat: No oropharyngeal exudate.   Eyes: Right eye exhibits no discharge. Left eye exhibits no discharge. No scleral icterus.   Neck: Neck supple.   Cardiovascular: Normal rate and regular rhythm.  Exam reveals distant heart sounds. Exam reveals no friction rub.    No murmur heard.  Pulmonary/Chest: Effort normal and breath sounds normal. No stridor. No respiratory distress. He has no wheezes.   Diminished air entry, CTA   Abdominal: Bowel sounds are normal. He exhibits no distension.   Good BS, soft, nontender   Musculoskeletal: He exhibits no edema.   Neurological:   Somnolent, nonverbal    Skin: Skin is warm and dry. He is not diaphoretic. No erythema.   Psychiatric: He is noncommunicative.   Vitals reviewed.      Recent Labs      04/30/18   0923  04/30/18   2330   WBC  8.5  11.2*   RBC  2.37*  2.41*   HEMOGLOBIN  7.7*  7.7*   HEMATOCRIT  24.0*  25.3*   MCV  101.3*  105.0*   MCH  32.5  32.0   MCHC  32.1*  30.4*   RDW  59.0*  61.5*   PLATELETCT  419  437   MPV  9.9  10.0     Recent Labs      04/30/18   0923  04/30/18   2330   SODIUM  147*  147*   POTASSIUM  3.8  3.9   CHLORIDE  106  104   CO2  33  31   GLUCOSE  171*  158*   BUN  63*  58*   CREATININE  3.01*  2.75*   CALCIUM  8.7  8.8                      Assessment/Plan     * Acute encephalopathy due to seizures, delirium- (present on admission)   Assessment & Plan    - Persists, unknown cause at this time  - Patient did have a EEG concerning for seizures, now placed on Keppra, remains altered, could possibly be due to  Keppra  - Patient also had uremia, this is improved with hemodialysis but his mentation is still off  - Could've initially been due to sepsis however this is resolved  - Could be due to hospitalization  - Likely multifactorial  - Discuss with family at bedside, went to see how he progresses, considering hospice          Seizure ([facility])- (present on admission)   Assessment & Plan    -Continue Keppra  - No additional seizure-like activity        Shock circulatory ([facility])- (present on admission)   Assessment & Plan     - Resolved        Hypernatremia   Assessment & Plan    -Stable, continue free water flushes as able  - Repeat BMP in the morning        NSVT (nonsustained ventricular tachycardia) ([facility])   Assessment & Plan    - Monitor with telemetry  - Keep electrolytes normal        Dysphagia   Assessment & Plan    -Patient did pass swallowing evaluation with nectar thick liquids  - Patient also was on tube feeds, was tolerating mental overnight, concern for aspiration  - Patient has had tube feed-like substance suctioned with NT suctioning  - KUB from yesterday with a mild ileus, does have decreased bowel sounds  -Bowel sounds improved, restart tube feeds at trickle rate        Metabolic acidosis- (present on admission)   Assessment & Plan    - resolved        Ileus ([facility])   Assessment & Plan    -Noted again on KUB  - tube feeds held, now improved  -Restart tube feeds        Acute renal failure superimposed on stage 3 chronic kidney disease ([facility])- (present on admission)   Assessment & Plan    - Has required hemodialysis, now holding hemodialysis per nephrology        Anemia- (present on admission)   Assessment & Plan    - No sign of gross bleeding  - Repeat CBC in the morning  - Hemoglobin is stable  - Transfuse as needed        Acute respiratory failure with hypoxia, due to encephalopathy, possible pneumonia ([facility])- (present on admission)   Assessment & Plan    - Intubated 4/20, extubated 4/25  - Good sats on 4 L nasal  cannula  - Additional recommendations for intensivist        Cerebral ataxia (HCC)- (present on admission)   Assessment & Plan    -Chronic, PT/OT when able        Diabetes mellitus type 2, controlled (CMS-HCC)- (present on admission)   Assessment & Plan    -Continue insulin sliding scale, adjust as needed        Dyslipidemia- (present on admission)   Assessment & Plan    -Takes gemfibrozil at home, continue        HTN (hypertension)- (present on admission)   Assessment & Plan    -Continue amlodipine  - Continue when necessary medication and adjust as needed          Quality-Core Measures   Reviewed items::  Labs reviewed, Medications reviewed and Radiology images reviewed  [place] Catheter: discontinue [place].  DVT prophylaxis pharmacological::  Heparin  Assessed for rehabilitation services:  Patient was assess for and/or received rehabilitation services during this hospitalization

## 2018-05-02 NOTE — DISCHARGE PLANNING
Medical SW    Sw attended AM IDT Rounds.    RN reports, pt increased agitation, able to say name and , follows commands 50%, 2 BMs over night, mcarthur in place,     Plan: Sw to assist w/ d/c planning as needed.

## 2018-05-02 NOTE — THERAPY
Per RN, pt not able to follow commands. Will hold OT tx session today and attempt again as able.     Leticia Harman, OTR/L  Pager: [phone]

## 2018-05-02 NOTE — DIETARY
Nutrition Services: TF held since late 4/30 due to mild ileus, aspiration. Pt is having BMs and has good bowel sounds per RN. Cortrak remains in place. OK per Dr. Skelton to resume trickle TF @ 20 ml/hr. TF goal is Diabetisource AC @ 65 ml/hr to provide 1872 kcal, 94 grams protein, and 1279 ml free water per day. RD following.

## 2018-05-02 NOTE — DISCHARGE PLANNING
Medical SW    Referral: Tuesday 5/2/18 Sw spoke to [facility] in [place]. She indicates she needs a copy of the referral scanned and emailed to her as the fax machine is not working. She can be contacted at number [phone]. She spoke to pt's wife later Tuesday night.    Sw spoke to bedside RN and any medical questions were addressed as RN was near wife during phone conversation w/ [place]. Pt requires PT and has a cortrak in place which Simona indicates would qualify him for Skilled need and placement w/ their facility.    Plan: Sw to assist w/ d/c planning as needed.

## 2018-05-02 NOTE — DISCHARGE PLANNING
Secure emailed referral to [facility] per Zee DUNN's request. Email: [email or web address]

## 2018-05-02 NOTE — DISCHARGE PLANNING
Medical SW    Referral: Sw met w/ pt's wife at bedside. Sw updated regarding d/c planning. Wife reiterates she would like to travel w/ pt upon transfer via [facility] if possible. Sw indicated would wait to receive acceptance and an open bed for pt from [facility] then will set up transport once pt is medically cleared to leave. Sw will update wife as soon as Sw has updates.     Plan: Sw to assist w/ d/c planning as needed.

## 2018-05-02 NOTE — DISCHARGE PLANNING
Medical SW    Referral: Sw left  w/ [facility] indicating pt will most likely d/c tomorrow, per hospitalist, to [facility] in [place].    Thad further requested Josie w/ admissions have a referral packet scanned and emailed to her as the fax machine is not working.    Plan: Sw to assist w/ d/c planning as needed.

## 2018-05-02 NOTE — PROGRESS NOTES
Nephrology Progress Note, Adult, Complex               Author: Norma Alley Date & Time created: 5/2/2018  1:03 PM     Interval History:  77 year old with CAR and now on dialysis who came into the hospital with slurred speech, and found to have a negative MRI. 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   Lethargic  Off dialysis - recovered  Good UOP  Creat improving    Review of Systems:  Review of Systems   Reason unable to perform ROS: altered mental status.   Eyes: Blurred vision:    Double vision:    Eye pain:      Gastrointestinal: Blood in stool:      Neurological: Seizures:    Loss of consciousness:          Physical Exam:  Physical Exam   Constitutional: He appears well-developed and well-nourished. No distress.   HENT:   Head: Normocephalic and atraumatic.   Dry mucosa   Eyes: Conjunctivae and EOM are normal. Pupils are equal, round, and reactive to light.   Neck: Normal range of motion. Neck supple. No thyromegaly present.   Cardiovascular: Normal rate and regular rhythm.  Exam reveals no gallop and no friction rub.    Pulmonary/Chest: Effort normal. No respiratory distress. He has no wheezes. He has no rales.   Abdominal: Soft. Bowel sounds are normal. He exhibits distension.   Musculoskeletal: He exhibits edema.   Neurological:   lethargic   Skin: Skin is warm and dry.       Labs:  Recent Labs      04/30/18   2203   [id number]  7.46   [id number]  44.2*   [id number]  71.1   HYUF7SFC  92.9*   ARTHCO3  31*   ARTBE  6*         Recent Labs      04/30/18   0923 04/30/18 2330 05/02/18   1235   SODIUM  147*  147*  146*   POTASSIUM  3.8  3.9  4.3   CHLORIDE  106  104  108   CO2  33  31  31   BUN  63*  58*  50*   CREATININE  3.01*  2.75*  2.26*   CALCIUM  8.7  8.8  9.0     Recent Labs      04/30/18   0923 04/30/18 2330 05/02/18   1235   GLUCOSE  171*  158*  137*     Recent Labs      04/30/18   0923  04/30/18 2330 05/02/18   1235   RBC  2.37*  2.41*  2.94*   HEMOGLOBIN  7.7*  7.7*  9.8*    HEMATOCRIT  24.0*  25.3*  31.8*   PLATELETCT  419  437  377     Recent Labs      18   0923  18   2330  18   1235   WBC  8.5  11.2*  7.1   NEUTSPOLYS  79.60*  80.90*   --    LYMPHOCYTES  12.20*  11.60*   --    MONOCYTES  6.10  5.50   --    EOSINOPHILS  1.10  0.80   --    BASOPHILS  0.20  0.80   --            Hemodynamics:  Temp (24hrs), Av.4 °C (97.6 °F), Min:36.3 °C (97.4 °F), Max:36.5 °C (97.7 °F)  Temperature: 36.3 °C (97.4 °F)  Pulse  Av.7  Min: 37  Max: 104Heart Rate (Monitored): 81  NIBP: 142/72    Respiratory:    Respiration: (!) 28, Pulse Oximetry: 92 %     Work Of Breathing / Effort: Mild  RUL Breath Sounds: Crackles, RML Breath Sounds: Crackles, RLL Breath Sounds: Diminished, CATALINA Breath Sounds: Crackles, LLL Breath Sounds: Diminished  Fluids:    Intake/Output Summary (Last 24 hours) at 18 1303  Last data filed at 18 1000   Gross per 24 hour   Intake              540 ml   Output             1625 ml   Net            -1085 ml        GI/Nutrition:  Orders Placed This Encounter   Procedures   • DIET NPO     Standing Status:   Standing     Number of Occurrences:   1     Order Specific Question:   Restrict to:     Answer:   Strict [1]     Medical Decision Making, by Problem:  Active Hospital Problems    Diagnosis   • *Encephalopathy [G93.40]   • Shock circulatory (CMS-[facility]) [R57.9]   • Acute respiratory failure (CMS-[facility]) [J96.00]   • Acute blood loss anemia [D62]   • Diabetes mellitus type 2, controlled (CMS[facility]) [E11.9]   • Cerebral ataxia (CMS-[facility]) [G11.9]   • Hypothermia [T68.XXXA]   • HTN (hypertension) [I10]   • Acute on chronic kidney failure (CMS-[facility]) [N17.9, N18.9]   • GERD (gastroesophageal reflux disease) [K21.9]   • Dyslipidemia [E78.5]     1. CAR - improving -to monitor  2. Anemia - Hb level better  3. HTN: BP well controlled  4. Hypernatremia - improving  5. AMS    Recs:   -continue current treatment  -monitor  BMP                                                                                                                                                                                                                                                                                                                                                                                                                                                                                                                                                                                                                                                                                  -Will follow  Quality-Core Measures   Reviewed items::  Labs reviewed and Medications reviewed

## 2018-05-02 NOTE — CARE PLAN
Problem: Safety  Goal: Will remain free from injury  Outcome: PROGRESSING AS EXPECTED      Problem: Psychosocial Needs:  Goal: Level of anxiety will decrease  Outcome: PROGRESSING SLOWER THAN EXPECTED   05/02/18 0400   OTHER   Patient Behaviors Restless;Anxious;Confused

## 2018-05-03 NOTE — PROGRESS NOTES
[facility] Hospitalist Progress Note    Date of Service: 5/3/2018    Chief Complaint  77 y.o. male admitted 2018 with altered mentation.    Interval Problem Update  Patient became very agitated as well as hypoxic, required intubation.  Extubated on .  Patient continued to be altered, EEG was obtained which showed likely seizure activity, is now on Keppra.  Patient continues to be altered, was very agitated overnight, difficulty sleeping.  MRI was obtained, no acute process.  Patient also developed worsening kidney function, did require hemodialysis but has not for days.  Patient was also diagnosed with pneumonia, finished full course of antibiotics.  Discuss with family at bedside.  Pt seen and examined in the ICU, ICU care given.  Discussed patient condition and plan with Intensivist, RN, RT and charge nurse / hot rounds.    Extubated   Overnight, lethargic, confused, no ativan given  Stating name  Follows some  NSR  -150  Afebrile  bm x 2 overnight  Tolerating TF at 20  Adequate uop overnight via mcarthur  Edge of bed    Consultants/Specialty  Nephrology  Neurology  Cardiology  Palliative care    Disposition  Patient requires additional treatment hospital, will need placement at the time of discharge        Review of Systems   Unable to perform ROS: Mental acuity        Unable to obtain due to delirium/confusion.        Physical Exam  Laboratory/Imaging   Hemodynamics  Temp (24hrs), Av.4 °C (97.6 °F), Min:36.2 °C (97.2 °F), Max:36.7 °C (98 °F)   Temperature: 36.6 °C (97.9 °F)  Pulse  Av.7  Min: 37  Max: 104 Heart Rate (Monitored): 86  NIBP: 147/64     Respiratory      Respiration: 19, Pulse Oximetry: 96 %     Work Of Breathing / Effort: Mild  RUL Breath Sounds: Rhonchi, RML Breath Sounds: Clear, RLL Breath Sounds: Diminished, CATALINA Breath Sounds: Rhonchi, LLL Breath Sounds: Diminished    Fluids    Intake/Output Summary (Last 24 hours) at 18 0746  Last data filed at 18 0600   Gross  per 24 hour   Intake              970 ml   Output             1700 ml   Net             -730 ml       Nutrition  Orders Placed This Encounter   Procedures   • DIET NPO     Standing Status:   Standing     Number of Occurrences:   1     Order Specific Question:   Restrict to:     Answer:   Strict [1]     Physical Exam   Constitutional: He appears ill.   HENT:   Head: Normocephalic and atraumatic.   Eyes: Right eye exhibits no discharge. Left eye exhibits no discharge.   Neck: Neck supple. No tracheal deviation present.   Cardiovascular: Normal rate and regular rhythm.  Exam reveals distant heart sounds.    Pulmonary/Chest: Effort normal. No stridor. No respiratory distress.   Abdominal: Soft. He exhibits no distension.   Musculoskeletal: He exhibits no edema.   Neurological:   Somnolent, nonverbal    Skin: Skin is warm and dry. He is not diaphoretic.   Psychiatric: He is noncommunicative.   Vitals reviewed.      Recent Labs      04/30/18   0923 04/30/18 2330 05/02/18   1235   WBC  8.5  11.2*  7.1   RBC  2.37*  2.41*  2.94*   HEMOGLOBIN  7.7*  7.7*  9.8*   HEMATOCRIT  24.0*  25.3*  31.8*   MCV  101.3*  105.0*  108.2*   MCH  32.5  32.0  33.3*   MCHC  32.1*  30.4*  30.8*   RDW  59.0*  61.5*  61.6*   PLATELETCT  419  437  377   MPV  9.9  10.0  9.9     Recent Labs      04/30/18   0923  04/30/18   2330  05/02/18   1235   SODIUM  147*  147*  146*   POTASSIUM  3.8  3.9  4.3   CHLORIDE  106  104  108   CO2  33  31  31   GLUCOSE  171*  158*  137*   BUN  63*  58*  50*   CREATININE  3.01*  2.75*  2.26*   CALCIUM  8.7  8.8  9.0                      Assessment/Plan     * Acute encephalopathy due to seizures, delirium- (present on admission)   Assessment & Plan    - Persists, unknown cause at this time  - Patient did have a EEG concerning for seizures, now placed on Keppra, remains altered, could possibly be due to Keppra  - Patient also had uremia, this is improved with hemodialysis but his mentation is still off  - Could've  initially been due to sepsis however this is resolved  - Could be due to hospitalization  - Likely multifactorial  - Discuss with family at bedside, went to see how he progresses, considering hospice          Seizure ([facility])- (present on admission)   Assessment & Plan    -Continue Keppra  - No additional seizure-like activity        Shock circulatory ([facility])- (present on admission)   Assessment & Plan     - Resolved        Hypernatremia   Assessment & Plan    -Stable, continue free water flushes as able  - Repeat BMP in the morning        NSVT (nonsustained ventricular tachycardia) ([facility])   Assessment & Plan    - Monitor with telemetry  - Keep electrolytes normal        Dysphagia   Assessment & Plan    -Patient did pass swallowing evaluation with nectar thick liquids  - Patient also was on tube feeds, was tolerating mental overnight, concern for aspiration  - Patient has had tube feed-like substance suctioned with NT suctioning  - KUB from yesterday with a mild ileus, does have decreased bowel sounds  -Bowel sounds improved, restart tube feeds at trickle rate        Metabolic acidosis- (present on admission)   Assessment & Plan    - resolved        Ileus ([facility])   Assessment & Plan    -Noted again on KUB  - tube feeds held, now improved  - Tolerating tube feeds at 20        Acute renal failure superimposed on stage 3 chronic kidney disease ([facility])- (present on admission)   Assessment & Plan    - Has required hemodialysis, now holding hemodialysis per nephrology        Anemia- (present on admission)   Assessment & Plan    - No sign of gross bleeding  - Repeat CBC in the morning  - Hemoglobin is stable  - Transfuse as needed        Acute respiratory failure with hypoxia, due to encephalopathy, possible pneumonia ([facility])- (present on admission)   Assessment & Plan    - Intubated 4/20, extubated 4/25  - Good sats on 4 L nasal cannula  - Additional recommendations for intensivist        Cerebral ataxia ([facility])- (present on admission)    Assessment & Plan    -Chronic, PT/OT when able        Diabetes mellitus type 2, controlled (CMS-HCC)- (present on admission)   Assessment & Plan    -Continue insulin sliding scale, adjust as needed        Dyslipidemia- (present on admission)   Assessment & Plan    -Takes gemfibrozil at home, continue        HTN (hypertension)- (present on admission)   Assessment & Plan    -Continue amlodipine  - Continue when necessary medication and adjust as needed          Quality-Core Measures   Reviewed items::  Labs reviewed, Medications reviewed and Radiology images reviewed  Holland Catheter: discontinue Holland.  DVT prophylaxis pharmacological::  Heparin  Assessed for rehabilitation services:  Patient was assess for and/or received rehabilitation services during this hospitalization

## 2018-05-03 NOTE — THERAPY
"Physical Therapy Treatment completed.   Bed Mobility:  Supine to Sit: Total Assist  Transfers: Sit to Stand: Unable to Participate  Gait: Level Of Assist: Unable to Participate with No Equipment Needed       Plan of Care: Will benefit from Physical Therapy 3 times per week  Discharge Recommendations: Equipment: Will Continue to Assess for Equipment Needs.   See \"Rehab Therapy-Acute\" Patient Summary Report for complete documentation.       "

## 2018-05-03 NOTE — PROGRESS NOTES
Assumed care of pt at 14:20. Received bedside report from nightshift RN. Pt is A&O to self and place. Pt able to recognize granddaughter at bedside, pt wife pt wasn't able to yesterday. pt awaiting Xray for cortrak placement confirmation. Pt has heel protectant boots in place. Pt has bilateral soft wrist restraints in place. Call light is within reach. Bed is locked and in the lowest position. Hourly rounding in place.  Wife is at bedside. 2 RN skin check and assessment completed.

## 2018-05-03 NOTE — PROGRESS NOTES
2 RN skin check completed. Pt has blanchable redness to skin right above both ears bilaterally. Pt has bruising to BUE with small generalized scabs to BUE. Pt has blanchable redness to sacral area. Dressing in place to L. Posterior foot covering scabbed area. Pt also has several scabs to the lateral and medial L foot. Blanching redness to bilateral heels. Fungal infection to bilateral toe nails and R. Hand fingernails.

## 2018-05-03 NOTE — DISCHARGE PLANNING
Medical SW    Sw attended AM IDT Rounds.    RN reports, pt lethargic and confused overnight, not sedated, able to say name, answer yes/no questions, able to say  and follow commands at times, pulled cortrak out overnight it has been replaced, sat edge of bed,     Plan: Sw to assist w/ d/c planning as needed.

## 2018-05-03 NOTE — PROGRESS NOTES
Patient pulled cortrack partially out during a turn, RN paused tube feed, reinserted cortrack to the original length, xray verification ordered

## 2018-05-03 NOTE — PROGRESS NOTES
RN paged Dr. Domingo Skelton to update on DX-ABDOMEN FOR TUBE PLACEMENT results. RN read finds to the DrAlesia As it was written. Per MD alexandra for RN to use cortrak, start tube feed at 20 ml/hr

## 2018-05-03 NOTE — PROGRESS NOTES
Request for tube feed pump was placed at 1000. Called [facility] at 1300 and was told no pumps were available currently, but a call was sent out to all the units. [facility] finally located a pump at 1745 and Diabetasource was started at 20mL an hour.

## 2018-05-03 NOTE — PROGRESS NOTES
Nephrology Hospital Progress Note               Author: Norma Hager Date & Time created: 5/3/2018  12:50 PM     Interval History:  75 y/o male admitted with slurred speech, negative MRI, complicated with CAR  Required temporary dialysis -recovered  Dialysis catheter pulled  Creatinine improving  Good UOP  Still confused    Review of Systems:  Review of Systems   Reason unable to perform ROS: confused.       Physical Exam:  Physical Exam   Constitutional: He appears well-developed and well-nourished. No distress.   HENT:   Head: Normocephalic and atraumatic.   Mouth/Throat: Oropharynx is clear and moist.   Eyes: Conjunctivae are normal. Pupils are equal, round, and reactive to light.   Neck: No thyromegaly present.   Cardiovascular: Normal rate and regular rhythm.  Exam reveals no gallop and no friction rub.    Pulmonary/Chest: Effort normal and breath sounds normal. No respiratory distress. He has no wheezes. He has no rales.   Abdominal: Soft. Bowel sounds are normal. He exhibits no distension. There is no tenderness.   Musculoskeletal: He exhibits no edema.   Lymphadenopathy:     He has no cervical adenopathy.   Neurological:   confused   Nursing note and vitals reviewed.      Labs:  Recent Labs      04/30/18   2203   [id number]  7.46   [id number]  44.2*   [id number]  71.1   NQAT9ILI  92.9*   ARTHCO3  31*   ARTBE  6*         Recent Labs      04/30/18 2330 05/02/18   1235   SODIUM  147*  146*   POTASSIUM  3.9  4.3   CHLORIDE  104  108   CO2  31  31   BUN  58*  50*   CREATININE  2.75*  2.26*   CALCIUM  8.8  9.0     Recent Labs      04/30/18 2330 05/02/18   1235   GLUCOSE  158*  137*     Recent Labs      04/30/18 2330 05/02/18   1235   RBC  2.41*  2.94*   HEMOGLOBIN  7.7*  9.8*   HEMATOCRIT  25.3*  31.8*   PLATELETCT  437  377     Recent Labs      04/30/18 2330 05/02/18   1235   WBC  11.2*  7.1   NEUTSPOLYS  80.90*   --    LYMPHOCYTES  11.60*   --    MONOCYTES  5.50   --    EOSINOPHILS  0.80   --     BASOPHILS  0.80   --      Hemodynamics:  Temp (24hrs), Av.5 °C (97.7 °F), Min:36.2 °C (97.2 °F), Max:36.7 °C (98 °F)  Temperature: 36.6 °C (97.9 °F)  Pulse  Av.7  Min: 37  Max: 104Heart Rate (Monitored): (P) 85  Blood Pressure : (P) 143/67, NIBP: (P) 134/64    Respiratory:    Respiration: (!) (P) 25, Pulse Oximetry: (P) 96 %     Work Of Breathing / Effort: Mild  RUL Breath Sounds: Rhonchi, RML Breath Sounds: Clear, RLL Breath Sounds: Diminished, CATALINA Breath Sounds: Rhonchi, LLL Breath Sounds: Diminished  Fluids:    Intake/Output Summary (Last 24 hours) at 18 1250  Last data filed at 18 1200   Gross per 24 hour   Intake              810 ml   Output             1600 ml   Net             -790 ml     Weight: 82.1 kg (181 lb)  GI/Nutrition:  Orders Placed This Encounter   Procedures   • DIET NPO     Standing Status:   Standing     Number of Occurrences:   1     Order Specific Question:   Restrict to:     Answer:   Strict [1]     Medical Decision Making, by Problem:  Active Hospital Problems    Diagnosis   • *Acute encephalopathy due to seizures, delirium [G93.40]   • Seizure (HCC) [R56.9]   • Shock circulatory (HCC) [R57.9]   • Hypernatremia [E87.0]   • Ileus (HCC) [K56.7]   • Metabolic acidosis [E87.2]   • Dysphagia [R13.10]   • NSVT (nonsustained ventricular tachycardia) (HCC) [I47.2]   • Acute respiratory failure with hypoxia, due to encephalopathy, possible pneumonia (HCC) [J96.00]   • Anemia [D64.9]   • Acute renal failure superimposed on stage 3 chronic kidney disease (HCC) [N17.9, N18.3]   • Cerebral ataxia (HCC) [G11.9]   • Diabetes mellitus type 2, controlled (CMS-HCC) [E11.9]   • HTN (hypertension) [I10]   • Dyslipidemia [E78.5]       Labs reviewed and Medications reviewed                  Assessment and Plan    1.CAR -improving -off HD  2.HTN: BP well controlled  3.Electrolytes: hypernatremia better  4.Anemia: Hb to monitor  5.Volume: well controlled  6.AMS    Recs: monitor BMP, keep well  hydrated

## 2018-05-03 NOTE — DISCHARGE PLANNING
Medical SW    Referral: Sw advised by pt's wife and bedside RN pt is d/cing to [facility].    Sw spoke to hTad Lake to update regarding pt's d/c plan.    Plan: Sw to assist w/ d/c planning as needed.

## 2018-05-03 NOTE — PROGRESS NOTES
Cortrak Placement    Tube Team verified patient name and medical record number prior to tube placement.  Cortrak tube (43 inches, 10 [place]) placed at 78 cm in right nare.  Per Cortrak picture, tube appears to be in the stomach.  Nursing Instructions: Awaiting KUB to confirm placement before use for medications or feeding. Once placement confirmed, flush tube with 30 ml of water, and then remove and save stylet, in patient medication drawer.

## 2018-05-03 NOTE — CARE PLAN
Problem: Safety  Goal: Will remain free from falls  Outcome: PROGRESSING AS EXPECTED    Intervention: Assess risk factors for falls  [place] Fall assessment done. Will monitor frequently.       Problem: Bowel/Gastric:  Goal: Will not experience complications related to bowel motility  Outcome: PROGRESSING SLOWER THAN EXPECTED    Intervention: Assess baseline bowel pattern  Spoke with wife about pts normal bowel pattern. Will continue to monitor.

## 2018-05-03 NOTE — DISCHARGE PLANNING
Medical SW    Referral: Sw called [facility] in [place] to f/u w/ referral and INS AUTH for transport.    Josie w/ facility admissions indicates she will accept pt once he is medically stable to transfer. Sw updated regarding concerns for pt and feed tube. Medical staff waiting for pt to stabilize w/ feed tube process first.      Plan: Sw to assist w/ d/c planning as needed.

## 2018-05-04 NOTE — PROGRESS NOTES
Neuro RN attempts to bridal coretrak. Unsuccessful. Trumpet removed from left nare. Pt maintaining o2 3l oxymask. Restraints removed from patient, assisted ROM, circulation intact. Some edema noted to bilateral hands. IV to ANAND infusing well. Suction at bedside. rr=22

## 2018-05-04 NOTE — PROGRESS NOTES
MD called back, results of KUB read + illeus, junky cough reported.  MD orders to hold tube feeds d/t illeus. Ok to give meds through coretrak.

## 2018-05-04 NOTE — PROGRESS NOTES
IV to LFA removed d/t infiltration, New IV placed to LFA. , pt has pulled out coretrak, 3L oxymask in place trumpet to left nare. Holland to gravity. Pt very restless, yells out, cannot be redirected. Ativan given after new IV placed.     2250- Radiology in to do KUB, pt pulls out IV during repositioning. New IV placed to TONY #20

## 2018-05-04 NOTE — PROGRESS NOTES
0615- pt becoming restless and yelling out. Medicated with ativan per MD order. Repositioned, ROM performed on all extremities

## 2018-05-04 NOTE — PROGRESS NOTES
Called radiology to check status of KUB reading. Tech states radiologist is very behind. Requested the KUB be read ASAP.

## 2018-05-04 NOTE — DISCHARGE PLANNING
Transport has been arranged with Gina at [facility] for 9:00 AM this Sunday. Approved Service Form was received.    Spoke to Iliana at [facility] to notify her of transport time and date. They will need a discharge summary before 10:00 AM tomorrow (Saturday) because they will need to get pt's medication and their pharmacy is closed on Sunday.     Iliana will need a discharge summary before pt leaves the hospital. We will need to call Iliana specifically and verify she has received discharge summary before pt leaves hospital. DEBBIE Lake notified.

## 2018-05-04 NOTE — PROGRESS NOTES
Cortrak Placement    Tube Team verified patient name and medical record number prior to tube placement.  Cortrak tube (109cm, 10F) placed at 89 cm in right  nare.  Per Cortrak picture, tube appears to be in the stomach.  Nursing Instructions: Awaiting KUB to confirm placement before use for medications or feeding. Once placement confirmed, flush tube with 30 ml of water, and then remove and save stylet, in patient medication drawer.

## 2018-05-04 NOTE — DISCHARGE PLANNING
Received [facility] PCS form, forms faxed to [facility] to arrange transport to [facility] in [facility].

## 2018-05-04 NOTE — DISCHARGE PLANNING
Medical Social Work  Patient's insurance would not cover cost of [facility], $14,140 or [facility] [employer] was $10,400.  [employer] will provide transport for $1760.00.  Currently waiting for [facility] to call back on what day they can transport this weekend.  [facility] has stated [facility] will take him either day.      Patient is m/c to discharge.

## 2018-05-04 NOTE — PROGRESS NOTES
Pt suctioned, o2=92% 3L oxy mask in place wrist restraints and mittens in place. Cushion bootson pt. Trumpet to left nare, right nare with new coretrak.

## 2018-05-04 NOTE — DISCHARGE PLANNING
[facility] is working on arrangements for transport to [place]. We are waiting to see if they can get a  for either Saturday or Sunday. Awaiting Response.     Approved Services form already faxed to [facility].

## 2018-05-04 NOTE — THERAPY
Patient with concerns for ileus on x-ray of the abdomen.  Discussed case with MD and SLP will hold until the patient is appropriate. Has Genna but currently NPO.

## 2018-05-04 NOTE — DIETARY
Nutrition Support Weekly Update: Day 14 of admit.  Jose Connor is a 77 y.o. male with admitting DX of Acute respiratory failure with hypoxia.  Tube feeding initiated on 4/20, but pt has had issues with tolerance. Pt did get a po diet for 2 days per SLP, but was made NPO again on 4/30 d/t lethargy and aspiration concerns.     Pt has had minimal nutrition since 4/30, TF was tried at 20 mL/hr with Diabetisource but now on hold d/t ileus. Cortrak tube is in the duodenum. Pt will be transferred to [place] on Sunday.  Family considering hospice?         Assessment:  Weight: yesterday pt was 82.1 kg via bed scale.  Admit wt was 84.6 kg.   Re-estimation of needs using current wt: 1700 - 1800 kcals (21-22 kcals/kg) and 82 - 99 gm pro (1.0 - 1.2 gm/kg) per day.     Evaluation:   1. Pt is on day #4 of minimal nutrition.     2. Labs: today Na+ 150, glu 125, BUN 38, creat 2.03  3. Last BM: 5/2  4. Pt with acute on chronic renal failure, has not needed dialysis since 4/25?        Recommendations/Plan:  1. MD's, please address nutrition plan of care.    2. Consider trying trickle TF with an elemental formula for better tolerance.  Could try Impact 1.5 at 20 mL/hr. If pt tolerates, increase TF to goal rate of 45 mL/hr to provide 1620 kcals, 102 gm pro and 832 mL free water per day.       RD following

## 2018-05-04 NOTE — PROGRESS NOTES
[facility] Hospitalist Progress Note    Date of Service: 2018    Chief Complaint  77 y.o. male admitted 2018 with altered mentation. Due to agitation and hypoxia, he was intubated in ICU and extubated on . He remained confused and put on restraint. He is on tube feeding but due to ileus, tube feeding was held.  EEG: seizure activity, started on keppra. Neuro on: MRI negative.  In ICU, he developed CAR and required brief period of HD. nephro on  He was transferred to medical floor on 5/3.    Interval Problem Update    Still confused on restraint. Discussed with the wife about his status. His wife said that she would like him to be transfer to the facility in [place], [place].      Consultants/Specialty  Nephrology  Neurology  Cardiology  Palliative care    Disposition  Patient requires additional treatment hospital, will need placement at the time of discharge        Review of Systems   Unable to perform ROS: Mental acuity        Unable to obtain due to delirium/confusion.        Physical Exam  Laboratory/Imaging   Hemodynamics  Temp (24hrs), Av.8 °C (98.2 °F), Min:36.5 °C (97.7 °F), Max:37.3 °C (99.1 °F)   Temperature: 36.8 °C (98.2 °F)  Pulse  Av.1  Min: 37  Max: 104 Heart Rate (Monitored): 75  Blood Pressure : 159/69, NIBP: 148/64     Respiratory      Respiration: 18, Pulse Oximetry: 98 %     Work Of Breathing / Effort: Mild  RUL Breath Sounds: Rhonchi, RML Breath Sounds: Clear, RLL Breath Sounds: Diminished, CATALINA Breath Sounds: Rhonchi, LLL Breath Sounds: Diminished    Fluids    Intake/Output Summary (Last 24 hours) at 18 0759  Last data filed at 18 0505   Gross per 24 hour   Intake              125 ml   Output             1475 ml   Net            -1350 ml       Nutrition  Orders Placed This Encounter   Procedures   • DIET NPO     Standing Status:   Standing     Number of Occurrences:   1     Order Specific Question:   Restrict to:     Answer:   Strict [1]     Physical Exam   Constitutional:  He appears ill.   HENT:   Head: Normocephalic and atraumatic.   Eyes: Right eye exhibits no discharge.   Neck: Neck supple.   Cardiovascular: Normal rate and regular rhythm.  Exam reveals distant heart sounds.    Pulmonary/Chest: Effort normal. No respiratory distress.   Abdominal: Soft. He exhibits no distension.   NG tube   Musculoskeletal: He exhibits no edema.   Neurological:   Somnolent, nonverbal    Skin: Skin is warm and dry. He is not diaphoretic.   On restraint   Psychiatric: He is noncommunicative.   Vitals reviewed.      Recent Labs      05/02/18   1235   WBC  7.1   RBC  2.94*   HEMOGLOBIN  9.8*   HEMATOCRIT  31.8*   MCV  108.2*   MCH  33.3*   MCHC  30.8*   RDW  61.6*   PLATELETCT  377   MPV  9.9     Recent Labs      05/02/18   1235  05/04/18   0326   SODIUM  146*  150*   POTASSIUM  4.3  3.8   CHLORIDE  108  110   CO2  31  31   GLUCOSE  137*  125*   BUN  50*  38*   CREATININE  2.26*  2.03*   CALCIUM  9.0  9.1                      Assessment/Plan     * Acute encephalopathy due to seizures, delirium- (present on admission)   Assessment & Plan    - likely multifactorial  - Persists, unknown cause at this time  - Patient did have a EEG concerning for seizures, now placed on Keppra, remains altered, could possibly be due to Keppra?  - Patient also had uremia, this is improved with hemodialysis but his mentation is still off  - Could've initially been due to sepsis however this is resolved  - Discuss with family at bedside, went to see how he progresses, considering hospice  - will hold haloperidol, benzos          Seizure (HCC)- (present on admission)   Assessment & Plan    -Continue Keppra  - No additional seizure-like activity        Shock circulatory (HCC)- (present on admission)   Assessment & Plan     - Resolved        Hypernatremia   Assessment & Plan    -Stable, continue free water flushes as able  - Repeat BMP in the morning        NSVT (nonsustained ventricular tachycardia) ([facility])   Assessment & Plan     - Monitor with telemetry  - Keep electrolytes normal        Dysphagia   Assessment & Plan    - Patient did pass swallowing evaluation with nectar thick liquids  - Patient also was on tube feeds, was tolerating mental overnight, concern for aspiration  - Patient has had tube feed-like substance suctioned with NT suctioning  - KUB showed mild ileus, does have decreased bowel sounds  - Bowel sounds improved, restart tube feeds at trickle rate        Metabolic acidosis- (present on admission)   Assessment & Plan    - resolved        Ileus (HCC)   Assessment & Plan    - Noted again on KUB  - tube feeds held, now improved          Acute renal failure superimposed on stage 3 chronic kidney disease (HCC)- (present on admission)   Assessment & Plan    - Has required hemodialysis, now holding hemodialysis per nephrology  - Cr: improving slowly  - follow bmp closely        Anemia- (present on admission)   Assessment & Plan    - No sign of gross bleeding  - Repeat CBC in the morning  - Hemoglobin is stable  - Transfuse as needed        Acute respiratory failure with hypoxia, due to encephalopathy, possible pneumonia ([facility])- (present on admission)   Assessment & Plan    - Intubated 4/20, extubated 4/25  - Good sats on 4 L nasal cannula  - Additional recommendations for intensivist        Cerebral ataxia (HCC)- (present on admission)   Assessment & Plan    -Chronic, PT/OT when able        Diabetes mellitus type 2, controlled (CMS-HCC)- (present on admission)   Assessment & Plan    -Continue insulin sliding scale, adjust as needed        Dyslipidemia- (present on admission)   Assessment & Plan    -Takes gemfibrozil at home, continue        Essential hypertension- (present on admission)   Assessment & Plan    -Continue amlodipine  - Continue when necessary medication and adjust as needed          Quality-Core Measures   Reviewed items::  Labs reviewed, Medications reviewed and Radiology images reviewed  [place] Catheter:  discontinue Holland.  DVT prophylaxis pharmacological::  Heparin  Assessed for rehabilitation services:  Patient was assess for and/or received rehabilitation services during this hospitalization

## 2018-05-05 NOTE — PROGRESS NOTES
Patient was restless and yelling,tried to pull lines,on bilateral soft wrist restraints and bilateral mittens,cortrack  in right nare patient, with meds given without problem,patient is noncompliant in repositioning.

## 2018-05-05 NOTE — PROGRESS NOTES
[facility] Hospitalist Progress Note    Date of Service: 2018    Chief Complaint  77 y.o. male admitted 2018 with altered mentation. Due to agitation and hypoxia, he was intubated in ICU and extubated on . He remained confused and put on restraint. He is on tube feeding but due to ileus, tube feeding was held.  EEG: seizure activity, started on keppra. Neuro on: MRI negative.  In ICU, he developed CAR and required brief period of HD. nephro on  He was transferred to medical floor on 5/3.    Interval Problem Update    Still confused on restraint. Discussed with the wife about his status.  Plan to take him to [place] tomorrow.    Consultants/Specialty  Nephrology  Neurology  Cardiology  Palliative care    Disposition  Patient requires additional treatment hospital, will need placement at the time of discharge        Review of Systems   Unable to perform ROS: Mental acuity        Unable to obtain due to delirium/confusion.        Physical Exam  Laboratory/Imaging   Hemodynamics  Temp (24hrs), Av.7 °C (98 °F), Min:36.3 °C (97.4 °F), Max:37.1 °C (98.7 °F)   Temperature: 36.3 °C (97.4 °F)  Pulse  Av.2  Min: 37  Max: 104    Blood Pressure : 153/69     Respiratory      Respiration: 18, Pulse Oximetry: 100 %     Work Of Breathing / Effort: Mild  RUL Breath Sounds: Rhonchi, RML Breath Sounds: Clear, RLL Breath Sounds: Diminished, CATALINA Breath Sounds: Rhonchi, LLL Breath Sounds: Diminished    Fluids    Intake/Output Summary (Last 24 hours) at 18 0751  Last data filed at 18 0551   Gross per 24 hour   Intake                0 ml   Output             1200 ml   Net            -1200 ml       Nutrition  Orders Placed This Encounter   Procedures   • DIET NPO     Standing Status:   Standing     Number of Occurrences:   1     Order Specific Question:   Restrict to:     Answer:   Strict [1]     Physical Exam   Constitutional: He appears ill.   HENT:   Head: Normocephalic and atraumatic.   Eyes: Right eye exhibits  no discharge. Left eye exhibits no discharge.   Neck: Neck supple. No thyromegaly present.   Cardiovascular: Normal rate and regular rhythm.  Exam reveals distant heart sounds.    Pulmonary/Chest: Effort normal. No respiratory distress.   Abdominal: Soft. He exhibits no distension.   NG tube   Musculoskeletal: He exhibits no edema.   Neurological:   Somnolent, nonverbal    Skin: Skin is warm and dry. He is not diaphoretic.   On restraint   Psychiatric: He is noncommunicative.   Vitals reviewed.      Recent Labs      05/02/18   1235  05/05/18   0319   WBC  7.1  4.3*   RBC  2.94*  2.16*   HEMOGLOBIN  9.8*  8.3*   HEMATOCRIT  31.8*  23.5*   MCV  108.2*  108.8*   MCH  33.3*  38.4*   MCHC  30.8*  35.3   RDW  61.6*  60.3*   PLATELETCT  377  343   MPV  9.9  9.5     Recent Labs      05/02/18   1235  05/04/18   0326  05/05/18   0319   SODIUM  146*  150*  151*   POTASSIUM  4.3  3.8  3.5*   CHLORIDE  108  110  112   CO2  31  31  31   GLUCOSE  137*  125*  110*   BUN  50*  38*  33*   CREATININE  2.26*  2.03*  1.83*   CALCIUM  9.0  9.1  8.7                      Assessment/Plan     * Acute encephalopathy due to seizures, delirium- (present on admission)   Assessment & Plan    - likely multifactorial  - Persists, unknown cause at this time  - Patient did have a EEG concerning for seizures, now placed on Keppra, remains altered, could possibly be due to Keppra?  - Patient also had uremia, this is improved with hemodialysis but his mentation is still off  - Could've initially been due to sepsis however this is resolved  - Discuss with family at bedside, went to see how he progresses, considering hospice  - will hold haloperidol, benzos  - wife wants to continue his care in [place], [place]          Seizure (HCC)- (present on admission)   Assessment & Plan    -Continue Keppra  - No additional seizure-like activity        Shock circulatory (HCC)- (present on admission)   Assessment & Plan     - Resolved        Hypernatremia   Assessment & Plan     - Na: 151  - free water flush  - Repeat BMP in the morning        NSVT (nonsustained ventricular tachycardia) ([facility])   Assessment & Plan    - Monitor with telemetry  - Keep electrolytes normal        Dysphagia   Assessment & Plan    - Patient did pass swallowing evaluation with nectar thick liquids  - Patient also was on tube feeds, was tolerating mental overnight, concern for aspiration  - Patient has had tube feed-like substance suctioned with NT suctioning  - having BM  - restarted TB at 20cc/hr and slowly advance as tolerated  - monitor closely for aspiration        Metabolic acidosis- (present on admission)   Assessment & Plan    - resolved        Ileus ([facility])   Assessment & Plan    - Noted again on KUB  - tube feeds held, now improved          Acute renal failure superimposed on stage 3 chronic kidney disease ([facility])- (present on admission)   Assessment & Plan    - Has required hemodialysis, now holding hemodialysis per nephrology  - Cr: improving slowly  - follow bmp closely        Anemia- (present on admission)   Assessment & Plan    - No sign of gross bleeding  - Repeat CBC in the morning  - Hemoglobin is stable  - Transfuse as needed        Acute respiratory failure with hypoxia, due to encephalopathy, possible pneumonia ([facility])- (present on admission)   Assessment & Plan    - Intubated 4/20, extubated 4/25  - Good sats on 4 L nasal cannula  - Additional recommendations for intensivist        Cerebral ataxia ([facility])- (present on admission)   Assessment & Plan    -Chronic, PT/OT when able        Diabetes mellitus type 2, controlled (CMS-[facility])- (present on admission)   Assessment & Plan    -Continue insulin sliding scale, adjust as needed        Dyslipidemia- (present on admission)   Assessment & Plan    -Takes gemfibrozil at home, continue        Essential hypertension- (present on admission)   Assessment & Plan    -Continue amlodipine  - Continue when necessary medication and adjust as needed           Quality-Core Measures   Reviewed items::  Labs reviewed, Medications reviewed and Radiology images reviewed  [place] Catheter: discontinue Holland.  DVT prophylaxis pharmacological::  Heparin  Assessed for rehabilitation services:  Patient was assess for and/or received rehabilitation services during this hospitalization

## 2018-05-05 NOTE — PROGRESS NOTES
Patient is alert and oriented x0. Patient is able to approprietly answer yes and no questions at times. Kortrak in place with tube feeding resumed. Remains on oxygen. Turned and repositioned every 2 hours and PRN. Appears very restless throughout shift. Maalox administered for complaints of stomach pain. Wife remains at bedside throughout shift.     Paperwork sent to facility in which patient is to be transferred tomorrow at 0900.

## 2018-05-05 NOTE — CARE PLAN
Problem: Safety  Goal: Will remain free from injury  Outcome: PROGRESSING AS EXPECTED  Patient remains free from falls. Wife at bedsied throughout shift. Bed alarm active and audible.     Problem: Bowel/Gastric:  Goal: Normal bowel function is maintained or improved  Outcome: PROGRESSING AS EXPECTED  Patient had multiple loose bowel movements on this shift.

## 2018-05-05 NOTE — PROGRESS NOTES
Nephrology Hospital Progress Note               Author: Norma Hager Date & Time created: 2018  2:16 PM     Interval History:  75 y/o male admitted with slurred speech, negative MRI, complicated with CAR  Required temporary dialysis -recovered  Dialysis catheter pulled  Creatinine improving  Good UOP  Still confused  Plan to d/c to SNF in [place]  Review of Systems:  Review of Systems   Reason unable to perform ROS: confused.       Physical Exam:  Physical Exam   Constitutional: He appears well-developed and well-nourished. No distress.   HENT:   Head: Normocephalic and atraumatic.   Mouth/Throat: Oropharynx is clear and moist.   Eyes: Conjunctivae are normal. Pupils are equal, round, and reactive to light.   Neck: No thyromegaly present.   Cardiovascular: Normal rate and regular rhythm.  Exam reveals no gallop and no friction rub.    Pulmonary/Chest: Effort normal and breath sounds normal. No respiratory distress. He has no wheezes. He has no rales.   Abdominal: Soft. Bowel sounds are normal. He exhibits no distension. There is no tenderness.   Musculoskeletal: He exhibits no edema.   Lymphadenopathy:     He has no cervical adenopathy.   Neurological:   confused   Nursing note and vitals reviewed.      Labs:        Invalid input(s): [id number]      Recent Labs      18   0326  18   SODIUM  150*  151*   POTASSIUM  3.8  3.5*   CHLORIDE  110  112   CO2  31  31   BUN  38*  33*   CREATININE  2.03*  1.83*   CALCIUM  9.1  8.7     Recent Labs      18   0326  18   031   GLUCOSE  125*  110*     Recent Labs      18   RBC  2.16*   HEMOGLOBIN  8.3*   HEMATOCRIT  23.5*   PLATELETCT  343     Recent Labs      18   WBC  4.3*     Hemodynamics:  Temp (24hrs), Av.7 °C (98 °F), Min:36.3 °C (97.4 °F), Max:37.1 °C (98.7 °F)  Temperature: 36.6 °C (97.9 °F)  Pulse  Av.3  Min: 37  Max: 104   Blood Pressure : (!) 162/70    Respiratory:    Respiration: 20, Pulse Oximetry: 95  %     Work Of Breathing / Effort: Mild  RUL Breath Sounds: Rhonchi, RML Breath Sounds: Diminished, RLL Breath Sounds: Diminished, CATALINA Breath Sounds: Rhonchi, LLL Breath Sounds: Diminished  Fluids:    Intake/Output Summary (Last 24 hours) at 05/03/18 1250  Last data filed at 05/03/18 1200   Gross per 24 hour   Intake              810 ml   Output             1600 ml   Net             -790 ml        GI/Nutrition:  Orders Placed This Encounter   Procedures   • DIET NPO     Standing Status:   Standing     Number of Occurrences:   1     Order Specific Question:   Restrict to:     Answer:   Strict [1]     Medical Decision Making, by Problem:  Active Hospital Problems    Diagnosis   • *Acute encephalopathy due to seizures, delirium [G93.40]   • Seizure ([facility]) [R56.9]   • Shock circulatory (HCC) [R57.9]   • Hypernatremia [E87.0]   • Ileus ([facility]) [K56.7]   • Metabolic acidosis [E87.2]   • Dysphagia [R13.10]   • NSVT (nonsustained ventricular tachycardia) ([facility]) [I47.2]   • Acute respiratory failure with hypoxia, due to encephalopathy, possible pneumonia ([facility]) [J96.00]   • Anemia [D64.9]   • Acute renal failure superimposed on stage 3 chronic kidney disease ([facility]) [N17.9, N18.3]   • Cerebral ataxia ([facility]) [G11.9]   • Diabetes mellitus type 2, controlled (CMS-[facility]) [E11.9]   • Essential hypertension [I10]   • Dyslipidemia [E78.5]       Core MeasuresAssessment and Plan    1.CAR -improving -off HD  2.HTN: BP well controlled  3.Electrolytes: hypernatremia worse -to add D5W  4.Anemia: Hb to monitor  5.Volume: well controlled  6.AMS    Recs: monitor BMP, keep well hydrated

## 2018-05-05 NOTE — CARE PLAN
Problem: Skin Integrity  Goal: Risk for impaired skin integrity will decrease  Outcome: PROGRESSING AS EXPECTED  Linen change frequently. Reposition patient frequently. Waffle mattress in place. Keep skin CDI.

## 2018-05-05 NOTE — CARE PLAN
Problem: Safety  Goal: Will remain free from injury  Outcome: PROGRESSING AS EXPECTED  Safety maintained. Family at bedside. Bed alarm on. Hourly rounding.

## 2018-05-05 NOTE — DISCHARGE PLANNING
Confirmed with bedside RN that the pt is ready for dc tomorrow. DC Sum has been posted. Request made by Jaya PIERRE to provide [facility] ([facility]) with araceli. TC to, [facility] [phone]. Left message informing them of the time sensitive nature of contact and requesting they call this IGNACIO as soon as possible.

## 2018-05-05 NOTE — DISCHARGE SUMMARY
CHIEF COMPLAINT ON ADMISSION  alterted mentation    CODE STATUS  DNR    HPI & HOSPITAL COURSE  This is a 77 y.o. male with PMH of dementia, HTN, DM II, here with altered mental status.   Due to agitation and hypoxia, he was intubated in ICU and extubated on 4/25.   He remained confused and put on restraint. He is on tube feeding but due to ileus, tube feeding was held.  EEG: seizure activity, started on keppra. Neuro was consulted. MRI brain was negative.  In ICU, he developed CAR and required brief period of HD. nephro on. His Cr is improving.  He was transferred to medical floor on 5/3.  He continue to be confused on the floor on restraint. TB restarted at 20 cc/hr. To titrate it slowly. Monitor   Closely for possible ileus symptoms again.  Wife is a POA and she requested to be transferred to medical facility in [place], [place].  Things to follow at other facility:  # seizure precaution  # on tube feeding: need speech evaluation, aspiration precaution. Titrate rate as needed  # check BMP 2 times a week  # monitor neuro status closely and consult neurology      The patient met 2-midnight criteria for an inpatient stay at the time of discharge.    Therefore, he is discharged in guarded and stable condition with close outpatient follow-up.    SPECIFIC OUTPATIENT FOLLOW-UP      DISCHARGE PROBLEM LIST  Principal Problem:    Acute encephalopathy due to seizures, delirium POA: Yes  Active Problems:    Shock circulatory (HCC) POA: Yes    Seizure (HCC) POA: Yes    Acute respiratory failure with hypoxia, due to encephalopathy, possible pneumonia ([facility]) POA: Yes    Anemia POA: Yes    Acute renal failure superimposed on stage 3 chronic kidney disease ([facility]) POA: Yes    Ileus ([facility]) POA: No    Metabolic acidosis POA: Yes    Dysphagia POA: No    NSVT (nonsustained ventricular tachycardia) ([facility]) POA: No    Hypernatremia POA: No    Essential hypertension POA: Yes    Dyslipidemia POA: Yes    Diabetes mellitus type 2, controlled (CMS-[facility])  POA: Yes    Cerebral ataxia (HCC) POA: Yes  Resolved Problems:    Hypothermia POA: Yes    Bradycardia POA: Yes      FOLLOW UP  No future appointments.  [facility] ([facility])  [address]  [place] [address]  [phone]        neurology in 1 week    In 1 week        MEDICATIONS ON DISCHARGE   Jose Connor   Home Medication Instructions DEB:[id number]    Printed on:05/05/18 5140   Medication Information                      amLODIPine (NORVASC) 10 MG Tab  1 Tab by Per NG Tube route every day.             aspirin EC (ECOTRIN) 81 MG TBEC  Take 1 Tab by mouth every day.             gemfibrozil (LOPID) 600 MG Tab  Take 600 mg by mouth 2 times a day.             levETIRAcetam (KEPPRA) 100 MG/ML Solution  Take 5 mL by mouth every 12 hours.             lisinopril (PRINIVIL) 10 MG Tab  Take 10 mg by mouth every evening.             metformin (GLUCOPHAGE) 500 MG TABS  Take 500-1,000 mg by mouth 2 times a day, with meals. 1,000 mg in AM and 500 mg at night                 DIET  Orders Placed This Encounter   Procedures   • DIET NPO     Standing Status:   Standing     Number of Occurrences:   1     Order Specific Question:   Restrict to:     Answer:   Strict [1]       ACTIVITY  As tolerated.  Weight bearing as tolerated      CONSULTATIONS  Neurology  intensivist  Cardiology  palliative    PROCEDURES      LABORATORY  Lab Results   Component Value Date/Time    SODIUM 151 (H) 05/05/2018 03:19 AM    POTASSIUM 3.5 (L) 05/05/2018 03:19 AM    CHLORIDE 112 05/05/2018 03:19 AM    CO2 31 05/05/2018 03:19 AM    GLUCOSE 110 (H) 05/05/2018 03:19 AM    BUN 33 (H) 05/05/2018 03:19 AM    CREATININE 1.83 (H) 05/05/2018 03:19 AM        Lab Results   Component Value Date/Time    WBC 4.3 (L) 05/05/2018 03:19 AM    HEMOGLOBIN 8.3 (L) 05/05/2018 03:19 AM    HEMATOCRIT 23.5 (L) 05/05/2018 03:19 AM    PLATELETCT 343 05/05/2018 03:19 AM        Total time of the discharge process exceeds 45 minutes

## 2018-05-05 NOTE — PROGRESS NOTES
Patient did not sleep since last night ,yelling and very confused,tried to removed restraints and lines,informed Dr. Shultz and got a order of benadryl 25 mg iv 1x dose and given.

## 2018-05-05 NOTE — PROGRESS NOTES
Pt alert and oriented x 0 at this time. Expressive aphasia. Kortrak in place, Tolerated meds through tube. Tube feeding held at this time, doctor aware, requested to hold until pt able to have a bowel movement. Pt bed rest at this time. Reposition pt frequently as he slides down in bed often. Pt agitated and restless. VSS. Safety maintained. Belongings within reach. Bed in lowest position and call light within reach. Wife at bedside, updated on plan of care and pt verbalized understanding. Will continue to monitor.

## 2018-05-06 NOTE — PROGRESS NOTES
Dr. Grayson was paged regarding order for non-violent soft wrist restraints bilaterally, previous order will be expiring at 2100 on 5/5/2018. Dr Grayson ordered a new order for non-violent soft wrist restraints for a duration of 24 hour.Order was read back verbally via telephone to verify correctness. Pt also has mitts on right and left hands. The justification for this intervention is for pt safety regarding pt previously attempting to pull out cortrack and PIV.

## 2018-05-06 NOTE — PROGRESS NOTES
"Discharge was delayed until tomorrow due to receiving facility's inability to accept patient because patient was in restraints until 1000 today. Wife and son became very upset and angry telling nursing staff that they were insisting to take the patient home via their private vehicle. Family kept insisting that nursing staff \"unhook all medical equipment\" and to get them a wheelchair.   When explained the complexity of the patients condition, including the need for oxygen and tube feedings, the family stated that the patient would \"be fine\" and that they \"could handle it\".   Social work, hospitalist RN, charge RN and this RN had multiple conversations with the family discussing the risks of leaving AMA.   Security was called when the son became very agitated and angry with staff insisting that he and the husbands wife were leaving with the patient.   "

## 2018-05-06 NOTE — PROGRESS NOTES
Had multiple discussions with the wife and family regarding his conditions.   We tried to explained to them multiple times that he can die on the way to [place], if they take him. He is on 3L of o2, feeding tube. They asked us to remove all the lines so that they can take him home.  They said they understood the risk but still demanded they take him home today, now. They said they are POA for the patient and they can do whatever they want with the patient's care.  We offered them about hospice care, but they don't want it initially. They said they will get that service when they get back to [place].  I discussed with Dr. Trejo about the situation and who recommended that they cannot take the patient home in that conditions and to call security if they try to take him physically. And also to get ethic committee involves.    We later had another very long discussion with the wife again and explained to her about hospice, palliaitive and comfort care in detail. She said she is going home today. She wants hospice care consult and like to be discharged him with home hospice.   Hospice consult placed.

## 2018-05-06 NOTE — PROGRESS NOTES
"IDT discussion with Dr Chandler it was determined that family taking patient home with no resources ready and immediately available in [place] would be unsafe.  Son and wife informed that we are trying our best to find a new discharge plan and that the patient may need to remain in the hospital for one more night.  Hospice was brought up by the wife and we informed her that as her medical team we would be more than happy to support any decision she made as long as we can provide for a safe discharge.  When the Son asked us to disconnect patient and allow them to take him against medical advice \"right now\" he was informed that he would not be allowed to remove the patient from the hospital.  He informed us that we had \"better get four more people and the police\" to stop him.  Security called.    "

## 2018-05-06 NOTE — DISCHARGE PLANNING
Per SHILOH Barrientos at  [place] they will not accept patient in restraints. CCT Fabi is aware.

## 2018-05-06 NOTE — FACE TO FACE
Face to Face Note  -  Durable Medical Equipment    Jamar Chandler M.D. - NPI: [id number]  I certify that this patient is under my care and that they have had a durable medical equipment(DME)face to face encounter by myself that meets the physician DME face-to-face encounter requirements with this patient on:    Date of encounter:   Patient:                    MRN:                       YOB: 2017  Jose Connor  [id number]  1940     The encounter with the patient was in whole, or in part, for the following medical condition, which is the primary reason for durable medical equipment:  COPD    I certify that, based on my findings, the following durable medical equipment is medically necessary:  Oxygen.    HOME O2 Saturation Measurements:(Values must be present for Home Oxygen orders)         ,     ,         My Clinical findings support the need for the above equipment due to:  Hypoxia    Supporting Symptoms: Hypoxia     ------------------------------------------------------------------------------------------------------------------    Face to Face Supporting Documentation - Home Health    The encounter with this patient was in whole or in part the primary reason for home health admission.    Date of encounter:   Patient:                    MRN:                       YOB: 2017  Jose Connor  [id number]  1940     Home health to see patient for:  Physical Therapy evaluation and treatment    Skilled need for:  Comment: PT/OT/speech    Skilled nursing interventions to include:  Comment: weakness, dysphagia    Homebound evidenced status by:  Need the aid of supportive devices such as crutches, canes, wheelchairs or walkers. Leaving home must require a considerable and taxing effort. There must exist a normal inability to leave the home.    Community Physician to provide follow up care: Pcp Not In Computer     Optional Interventions    Wound information & treatment:    Home  Infusion Therapy orders:    Line/Drain/Airway:    I certify the face to face encounter for this home care referral meets the CMS requirements and the encounter/clinical assessment with the patient was, in whole, or in part, for the medical condition(s) listed above, which is the primary reason for home health care. Based on my clinical findings: the service(s) are medically necessary, support the need for home health care, and the homebound criteria are met.  I certify that this patient has had a face to face encounter by myself.  Jamar Chandler M.D. - NPI: [id number]    *Debility, frailty and advanced age in the absence of an acute deterioration or exacerbation of a condition do not qualify a patient for home health.

## 2018-05-06 NOTE — DISCHARGE PLANNING
Call placed to [employer] weekend number [phone] message left to advise transport for today had been cancelled.  Supervisor Fabi has been advised as services paid for by [employer].

## 2018-05-06 NOTE — PROGRESS NOTES
Pt is alert and oriented x0 and at times recognizes questions when asked and attempts to communicate. However, pt was agitated, restless, and angry yelling during the night shift. Pt did not sleep.Pt has non-violent soft wrist restrains bilaterally, also there are mitts on right and left hand. Pt was attempting to pull out lines, Pt was reposition ed and assessed every 2 hours.     Pt is NPO and has Cortrak in place with Diabetasource running at 35 ml/ hour. Pt is tolerating tube feeding well. Aspiration precautions are in place, HOB is greater than 30 degrees.Pt has a mcarthur catheter in place, with 350 ml output. Pt has oxygen mask in place with 3 L O2, Pt has productive cough and is suctioned 2-3 times when oral care is performed, mouth swabs with water was implemented.

## 2018-05-06 NOTE — DISCHARGE PLANNING
Spoke with MANE Dunlap, transport has been cancelled for today due patient being placed into restraints.  Call placed to [facility]- Spoke with Etienne Charge Nurse, advised patient will no transfer today.  Per Nurse Etienne, Discharge Summary has not been received.  Discharge Summar faxed to [facility] at [phone] at 0807 on 05-06-18

## 2018-05-06 NOTE — DISCHARGE PLANNING
Medical Social Work    Referral: Discharge to SNF    Intervention: IGNACIO spoke with the  Yoselin, at [facility] who states she doubled checked with their , Nakia Donahue, and they CANNOT take a pt if they are in restraints. The restraints have been removed. Pt's spouse, Yael, stated she wants to do anything she can to just get pt home to [place] and that includes hospice at home. It was explained to her that we will need to send a referral and make sure they can accept his care. [place] has a volunteer hospice and [facility] for home health. Pt's spouse states [facility] services them for oxygen.    If Yael changes her mind and would like pt to d/c to the facility, pt is able to go after he is out of restraints for 24 hours and does not require 1:1 monitoring or a sitter due to the facility not being able to accommodate it.     Yael states she is exhausted due to being here at bedside with pt for 16 days. Yael will go home to [place] today and can be reached on the telephone.    Plan: Unit IGNACIO to follow Monday for discharge planning coordination.

## 2018-05-06 NOTE — PROGRESS NOTES
[facility] Hospitalist Progress Note    Date of Service: 2018    Chief Complaint  77 y.o. male admitted 2018 with altered mentation. Due to agitation and hypoxia, he was intubated in ICU and extubated on . He remained confused and put on restraint. He is on tube feeding but due to ileus, tube feeding was held.  EEG: seizure activity, started on keppra. Neuro on: MRI negative.  In ICU, he developed CAR and required brief period of HD. nephro on  He was transferred to medical floor on 5/3.    Interval Problem Update  Had a multiple discussions with the patient family. They insisted that they will take him home in their private vehicle. They said she is a nurse, he is a , palliative nurse in their family. Will try to get his restraint off slowly today and hopefully dc tomorrow.    Consultants/Specialty  Nephrology  Neurology  Cardiology  Palliative care    Disposition  Patient requires additional treatment hospital, will need placement at the time of discharge        Review of Systems   Unable to perform ROS: Mental acuity        Unable to obtain due to delirium/confusion.        Physical Exam  Laboratory/Imaging   Hemodynamics  Temp (24hrs), Av.6 °C (97.8 °F), Min:36.4 °C (97.6 °F), Max:36.7 °C (98 °F)   Temperature: 36.7 °C (98 °F)  Pulse  Av.5  Min: 37  Max: 104    Blood Pressure : 150/84     Respiratory      Respiration: 18, Pulse Oximetry: 98 %             Fluids    Intake/Output Summary (Last 24 hours) at 18 1007  Last data filed at 18 0345   Gross per 24 hour   Intake             1900 ml   Output              850 ml   Net             1050 ml       Nutrition  Orders Placed This Encounter   Procedures   • DIET NPO     Standing Status:   Standing     Number of Occurrences:   1     Order Specific Question:   Restrict to:     Answer:   Strict [1]   • DISCONTINUE DIET TRAY     Standing Status:   Standing     Number of Occurrences:   1     Physical Exam   Constitutional: He  appears ill.   HENT:   Head: Normocephalic and atraumatic.   Eyes: Right eye exhibits no discharge.   Neck: Neck supple. No thyromegaly present.   Cardiovascular: Normal rate.  Exam reveals distant heart sounds.    Pulmonary/Chest: Effort normal.   Abdominal: Soft. He exhibits no distension.   NG tube   Musculoskeletal: He exhibits no edema.   Skin: Skin is warm. He is not diaphoretic.   On restraint   Psychiatric: He is noncommunicative.   Vitals reviewed.      Recent Labs      05/05/18   0319   WBC  4.3*   RBC  2.16*   HEMOGLOBIN  8.3*   HEMATOCRIT  23.5*   MCV  108.8*   MCH  38.4*   MCHC  35.3   RDW  60.3*   PLATELETCT  343   MPV  9.5     Recent Labs      05/04/18   0326  05/05/18   0319   SODIUM  150*  151*   POTASSIUM  3.8  3.5*   CHLORIDE  110  112   CO2  31  31   GLUCOSE  125*  110*   BUN  38*  33*   CREATININE  2.03*  1.83*   CALCIUM  9.1  8.7                      Assessment/Plan     * Acute encephalopathy due to seizures, delirium- (present on admission)   Assessment & Plan    - likely multifactorial  - Persists, unknown cause at this time  - Patient did have a EEG concerning for seizures, now placed on Keppra, remains altered, could possibly be due to Keppra?  - Patient also had uremia, this is improved with hemodialysis but his mentation is still off  - Could've initially been due to sepsis however this is resolved  - Discuss with family at bedside, went to see how he progresses, considering hospice  - will hold haloperidol, benzos  - wife wants to continue his care in [place], [place]          Seizure ([facility])- (present on admission)   Assessment & Plan    -Continue Keppra  - No additional seizure-like activity        Shock circulatory ([facility])- (present on admission)   Assessment & Plan     - Resolved        Hypernatremia   Assessment & Plan    - Na: 151  - free water flush  - Repeat BMP in the morning        NSVT (nonsustained ventricular tachycardia) ([facility])   Assessment & Plan    - Monitor with telemetry  - Keep  electrolytes normal        Dysphagia   Assessment & Plan    - Patient did pass swallowing evaluation with nectar thick liquids  - Patient also was on tube feeds, was tolerating mental overnight, concern for aspiration  - Patient has had tube feed-like substance suctioned with NT suctioning  - having BM  - restarted TB at 35cc/hr and slowly advance as tolerated  - monitor closely for aspiration        Metabolic acidosis- (present on admission)   Assessment & Plan    - resolved        Ileus (HCC)   Assessment & Plan    - Noted again on KUB  - tube feeds held, now improved          Acute renal failure superimposed on stage 3 chronic kidney disease (HCC)- (present on admission)   Assessment & Plan    - Has required hemodialysis, now holding hemodialysis per nephrology  - Cr: improving slowly  - follow bmp closely        Anemia- (present on admission)   Assessment & Plan    - No sign of gross bleeding  - Repeat CBC in the morning  - Hemoglobin is stable  - Transfuse as needed        Acute respiratory failure with hypoxia, due to encephalopathy, possible pneumonia ([facility])- (present on admission)   Assessment & Plan    - Intubated 4/20, extubated 4/25  - Good sats on 4 L nasal cannula  - Additional recommendations for intensivist        Cerebral ataxia ([facility])- (present on admission)   Assessment & Plan    -Chronic, PT/OT when able        Diabetes mellitus type 2, controlled (CMS-HCC)- (present on admission)   Assessment & Plan    -Continue insulin sliding scale, adjust as needed        Dyslipidemia- (present on admission)   Assessment & Plan    -Takes gemfibrozil at home, continue        Essential hypertension- (present on admission)   Assessment & Plan    -Continue amlodipine  - Continue when necessary medication and adjust as needed          Quality-Core Measures   Reviewed items::  Labs reviewed, Medications reviewed and Radiology images reviewed  Holland Catheter: discontinue Holland.  DVT prophylaxis pharmacological::   Heparin  Assessed for rehabilitation services:  Patient was assess for and/or received rehabilitation services during this hospitalization      Total time spent 64 minutes with > 50% time spent on direct patient care.

## 2018-05-06 NOTE — DISCHARGE PLANNING
Received choice form from [facility] Fabi, Referral sent to [facility] in [place] at 1602 on 05-06-18.  Volunteer hospice is available thru [facility] per patient wife.

## 2018-05-06 NOTE — DISCHARGE PLANNING
Anticipated Discharge Disposition: SNF    Action: Pt was restrained at 8pm last night with bilateral soft wrist restraints and is currently still restrained with bilateral wrist restraints and mittens. THAD notified Fabi Arndt, Supervisor of [employer], and she confirmed that the discharge has to be cancelled due to pt currently being in restraints. Thad met with pt's wife and son at bedside and explained that pt cannot transfer due to being in restraints. Pt's wife states rehabbing pt was possibly for only a short amount of time anyway's and she would like to take pt home with palliative care. THAD notified MD that pt's wife would like to speak with him about if there are any other options for discharge. Fabi Arndt will assist with cancelling [facility].    Barriers to Discharge: Restraints     Plan: THAD to follow for d/c planning

## 2018-05-06 NOTE — DISCHARGE INSTRUCTIONS
Discharge Instructions    Discharged to other by ambulance with relative. Discharged via ambulance, hospital escort: Yes.  Special equipment needed: Oxygen    Be sure to schedule a follow-up appointment with your primary care doctor or any specialists as instructed.     Discharge Plan:   Pneumococcal Vaccine Administered/Refused: Not given - Patient refused pneumococcal vaccine  Influenza Vaccine Indication: Not indicated: Previously immunized this influenza season and > 8 years of age    I understand that a diet low in cholesterol, fat, and sodium is recommended for good health. Unless I have been given specific instructions below for another diet, I accept this instruction as my diet prescription.   Other diet: NPO        · Is patient discharged on Warfarin / Coumadin?   Discharge Instructions    Discharged to home by car with relative. Discharged via wheelchair, hospital escort: Yes.  Special equipment needed: Oxygen    Be sure to schedule a follow-up appointment with your primary care doctor or any specialists as instructed.     Discharge Plan:   Pneumococcal Vaccine Administered/Refused: Not given - Patient refused pneumococcal vaccine  Influenza Vaccine Indication: Not indicated: Previously immunized this influenza season and > 8 years of age    I understand that a diet low in cholesterol, fat, and sodium is recommended for good health. Unless I have been given specific instructions below for another diet, I accept this instruction as my diet prescription.         Heparin injection  What is this medicine?  HEPARIN (HEP a rin) is an anticoagulant. It is used to treat or prevent clots in the veins, arteries, lungs, or heart. It stops clots from forming or getting bigger. This medicine prevents clotting during open-heart surgery, dialysis, or in patients who are confined to bed.  This medicine may be used for other purposes; ask your health care provider or pharmacist if you have questions.  COMMON BRAND NAME(S):  Hep-Lock, Hep-Lock U/P, Hepflush-10, Monoject Prefill Advanced Heparin Lock Flush  What should I tell my health care provider before I take this medicine?  They need to know if you have any of these conditions:  -bleeding disorders, such as hemophilia or low blood platelets  -bowel disease or diverticulitis  -endocarditis  -high blood pressure  -liver disease  -recent surgery or delivery of a baby  -stomach ulcers  -an unusual or allergic reaction to heparin, benzyl alcohol, sulfites, other medicines, foods, dyes, or preservatives  -pregnant or trying to get pregnant  -breast-feeding  How should I use this medicine?  This medicine is given by injection or infusion into a vein. It can also be given by injection of small amounts under the skin. It is usually given by a health care professional in a hospital or clinic setting.  If you get this medicine at home, you will be taught how to prepare and give this medicine. Use exactly as directed. Take your medicine at regular intervals. Do not take it more often than directed. Do not stop taking except on your doctor's advice. Stopping this medicine may increase your risk of a blot clot. Be sure to refill your prescription before you run out of medicine.  It is important that you put your used needles and syringes in a special sharps container. Do not put them in a trash can. If you do not have a sharps container, call your pharmacist or healthcare provider to get one.  Talk to your pediatrician regarding the use of this medicine in children. While this medicine may be prescribed for children for selected conditions, precautions do apply.  Overdosage: If you think you have taken too much of this medicine contact a poison control center or emergency room at once.  NOTE: This medicine is only for you. Do not share this medicine with others.  What if I miss a dose?  If you miss a dose, take it as soon as you can. If it is almost time for your next dose, take only that dose.  Do not take double or extra doses.  What may interact with this medicine?  Do not take this medicine with any of the following medications:  -aspirin and aspirin-like drugs  -mifepristone  -medicines that treat or prevent blood clots like warfarin, enoxaparin, and dalteparin  -palifermin  -protamine  This medicine may also interact with the following medications:  -dextran  -digoxin  -hydroxychloroquine  -medicines for treating colds or allergies  -nicotine  -NSAIDs, medicines for pain and inflammation, like ibuprofen or naproxen  -phenylbutazone  -tetracycline antibiotics  This list may not describe all possible interactions. Give your health care provider a list of all the medicines, herbs, non-prescription drugs, or dietary supplements you use. Also tell them if you smoke, drink alcohol, or use illegal drugs. Some items may interact with your medicine.  What should I watch for while using this medicine?  While you are taking this medicine, carry an identification card with your name, the name and dose of medicine(s) being used, and the name and phone number of your doctor or health care professional or person to contact in an emergency.  Notify your doctor or health care professional and seek emergency treatment if you develop breathing problems; changes in vision; chest pain; severe, sudden headache; pain, swelling, warmth in the leg; trouble speaking; sudden numbness or weakness of the face, arm, or leg. These can be signs that your condition has gotten worse.  Notify your doctor or health care professional at once if you have cold, blue hands or feet.  If you are going to have surgery or dental work, tell your doctor or health care professional that you have received this medicine. Be careful brushing and flossing your teeth or using a toothpick while receiving this medicine because you may bleed more easily.  Avoid sports and activities that might cause injury while you are using this medicine. Severe falls  or injuries can cause unseen bleeding. Be careful when using sharp tools or knives. Consider using an electric razor.  What side effects may I notice from receiving this medicine?  Side effects that you should report to your doctor or health care professional as soon as possible:  -allergic reactions like skin rash, itching or hives, swelling of the face, lips, or tongue  -back pain  -burning or itching on the bottoms of the feet  -cold, blue, or painful hands and feet  -feeling faint or lightheaded, falls  -fever, chills  -nausea, vomiting  -signs and symptoms of bleeding such as bloody or black, tarry stools; red or dark-brown urine; spitting up blood or brown material that looks like coffee grounds; red spots on the skin; unusual bruising or bleeding from the eye, gums, or nose  -stomach pain  -unusually low blood pressure  Side effects that usually do not require medical attention (report to your doctor or health care professional if they continue or are bothersome):  -pain, redness, or irritation at site where injected  This list may not describe all possible side effects. Call your doctor for medical advice about side effects. You may report side effects to FDA at 5-121-FDA-0786.  Where should I keep my medicine?  Keep out of the reach of children.  Store unopened vials at room temperature between 15 and 30 degrees C (59 and 86 degrees F). Do not freeze. Do not use if solution is discolored or particulate matter is present. Throw away any unused medicine after the expiration date.  NOTE: This sheet is a summary. It may not cover all possible information. If you have questions about this medicine, talk to your doctor, pharmacist, or health care provider.  © 2018 Elsevier/Gold Standard (2014-04-15 16:19:24)          Acute Respiratory Failure, Adult  Acute respiratory failure occurs when there is not enough oxygen passing from your lungs to your body. When this happens, your lungs have trouble removing carbon  dioxide from the blood. This causes your blood oxygen level to drop too low as carbon dioxide builds up.  Acute respiratory failure is a medical emergency. It can develop quickly, but it is temporary if treated promptly. Your lung capacity, or how much air your lungs can hold, may improve with time, exercise, and treatment.  What are the causes?  There are many possible causes of acute respiratory failure, including:  Lung injury.  Chest injury or damage to the ribs or tissues near the lungs.  Lung conditions that affect the flow of air and blood into and out of the lungs, such as pneumonia, acute respiratory distress syndrome, and cystic fibrosis.  Medical conditions, such as strokes or spinal cord injuries, that affect the muscles and nerves that control breathing.  Blood infection (sepsis).  Inflammation of the pancreas (pancreatitis).  A blood clot in the lungs (pulmonary embolism).  A large-volume blood transfusion.  Peck.  Near-drowning.  Seizure.  Smoke inhalation.  Reaction to medicines.  Alcohol or drug overdose.  What increases the risk?  This condition is more likely to develop in people who have:  A blocked airway.  Asthma.  A condition or disease that damages or weakens the muscles, nerves, bones, or tissues that are involved in breathing.  A serious infection.  A health problem that blocks the unconscious reflex that is involved in breathing, such as hypothyroidism or sleep apnea.  A lung injury or trauma.  What are the signs or symptoms?  Trouble breathing is the main symptom of acute respiratory failure. Symptoms may also include:  Rapid breathing.  Restlessness or anxiety.  Skin, lips, or fingernails that appear blue (cyanosis).  Rapid heart rate.  Abnormal heart rhythms (arrhythmias).  Confusion or changes in behavior.  Tiredness or loss of energy.  Feeling sleepy or having a loss of consciousness.  How is this diagnosed?  Your health care provider can diagnose acute respiratory failure with a  medical history and physical exam. During the exam, your health care provider will listen to your heart and check for crackling or wheezing sounds in your lungs. Your may also have tests to confirm the diagnosis and determine what is causing respiratory failure. These tests may include:  Measuring the amount of oxygen in your blood (pulse oximetry). The measurement comes from a small device that is placed on your finger, earlobe, or toe.  Other blood tests to measure blood gases and to look for signs of infection.  Sampling your cerebral spinal fluid or tracheal fluid to check for infections.  Chest X-ray to look for fluid in spaces that should be filled with air.  Electrocardiogram (ECG) to look at the heart's electrical activity.  How is this treated?  Treatment for this condition usually takes places in a hospital intensive care unit (ICU). Treatment depends on what is causing the condition. It may include one or more treatments until your symptoms improve. Treatment may include:  Supplemental oxygen. Extra oxygen is given through a tube in the nose, a face mask, or a jimenez.  A device such as a continuous positive airway pressure (CPAP) or bi-level positive airway pressure (BiPAP or BPAP) machine. This treatment uses mild air pressure to keep the airways open. A mask or other device will be placed over your nose or mouth. A tube that is connected to a motor will deliver oxygen through the mask.  Ventilator. This treatment helps move air into and out of the lungs. This may be done with a bag and mask or a machine. For this treatment, a tube is placed in your windpipe (trachea) so air and oxygen can flow to the lungs.  Extracorporeal membrane oxygenation (ECMO). This treatment temporarily takes over the function of the heart and lungs, supplying oxygen and removing carbon dioxide. ECMO gives the lungs a chance to recover. It may be used if a ventilator is not effective.  Tracheostomy. This is a procedure that  creates a hole in the neck to insert a breathing tube.  Receiving fluids and medicines.  Rocking the bed to help breathing.  Follow these instructions at home:  Take over-the-counter and prescription medicines only as told by your health care provider.  Return to normal activities as told by your health care provider. Ask your health care provider what activities are safe for you.  Keep all follow-up visits as told by your health care provider. This is important.  How is this prevented?  Treating infections and medical conditions that may lead to acute respiratory failure can help prevent the condition from developing.  Contact a health care provider if:  You have a fever.  Your symptoms do not improve or they get worse.  Get help right away if:  You are having trouble breathing.  You lose consciousness.  Your have cyanosis or turn blue.  You develop a rapid heart rate.  You are confused.  These symptoms may represent a serious problem that is an emergency. Do not wait to see if the symptoms will go away. Get medical help right away. Call your local emergency services (911 in the [place] Do not drive yourself to the hospital.   This information is not intended to replace advice given to you by your health care provider. Make sure you discuss any questions you have with your health care provider.  Document Released: 12/23/2014 Document Revised: 07/15/2017 Document Reviewed: 07/05/2017          Other diet: Hypoxemia  Hypoxemia occurs when your blood does not contain enough oxygen. The body cannot work well when it does not have enough oxygen because every part of your body needs oxygen. Oxygen travels to all parts of the body through your blood. Hypoxemia can develop suddenly or can come on slowly.  CAUSES  Some common causes of hypoxemia include:  Long-term (chronic) lung diseases, such as chronic obstructive pulmonary disease (COPD) or interstitial lung disease.   Disorders that affect breathing at night, such as  sleep apnea.  Fluid buildup in your lungs (pulmonary edema).   Lung infection (pneumonia).  Lung or throat cancer.  Abnormal blood flow that bypasses the lungs (shunt).  Certain diseases that affect nerves or muscles.  A collapsed lung (pneumothorax).  A blood clot in the lungs (pulmonary embolus).  Certain types of heart disease.  Slow or shallow breathing (hypoventilation).   Certain medicines.  High altitudes.  Toxic chemicals and gases.  SIGNS AND SYMPTOMS  Not everyone who has hypoxemia will develop symptoms. If the hypoxemia developed quickly, you will likely have symptoms such as shortness of breath. If the hypoxemia came on slowly over months or years, you may not notice any symptoms. Symptoms can include:  Shortness of breath (dyspnea).  Bluish color of the skin, lips, or nail beds.  Breathing that is fast, noisy, or shallow.  A fast heartbeat.  Feeling tired or sleepy.  Being confused or feeling anxious.  DIAGNOSIS  To determine if you have hypoxemia, your health care provider may perform:  A physical exam.  Blood tests.  A pulse oximetry. A sensor will be put on your finger, toe, or earlobe to measure the percent of oxygen in your blood.  TREATMENT  You will likely be treated with oxygen therapy. Depending on the cause of your hypoxemia, you may need oxygen for a short time (weeks or months), or you may need it indefinitely. Your health care provider may also recommend other therapies to treat the underlying cause of your hypoxemia.  HOME CARE INSTRUCTIONS  Only take over-the-counter or prescription medicines as directed by your health care provider.  Follow oxygen safety measures if you are on oxygen therapy. These may include:  Always having a backup supply of oxygen.  Not allowing anyone to smoke around oxygen.  Handling the oxygen tanks carefully and as instructed.  If you smoke, quit. Stay away from people who smoke.  Follow up with your health care provider as directed.  SEEK MEDICAL CARE IF:  You  have any concerns about your oxygen therapy.  You still have trouble breathing.  You become short of breath when you exercise.  You are tired when you wake up.  You have a headache when you wake up.  SEEK IMMEDIATE MEDICAL CARE IF:   Your breathing gets worse.  You have new shortness of breath with normal activity.  You have a bluish color of the skin, lips, or nail beds.  You have confusion or cloudy thinking.  You cough up dark mucus.  You have chest pain.  You have a fever.  MAKE SURE YOU:  Understand these instructions.  Will watch your condition.  Will get help right away if you are not doing well or get worse.  This information is not intended to replace advice given to you by your health care provider. Make sure you discuss any questions you have with your health care provider.  Document Released: 07/02/2012 Document Revised: 12/23/2014 Document Reviewed: 07/17/2014        Special Instructions:     · Is patient discharged on Warfarin / Coumadin?   No         Elsevier Interactive Patient Education © 2017 [employer]  Hypertension  Hypertension is another name for high blood pressure. High blood pressure forces your heart to work harder to pump blood. A blood pressure reading has two numbers, which includes a higher number over a lower number (example: 110/72).  Follow these instructions at home:  Have your blood pressure rechecked by your doctor.  Only take medicine as told by your doctor. Follow the directions carefully. The medicine does not work as well if you skip doses. Skipping doses also puts you at risk for problems.  Do not smoke.  Monitor your blood pressure at home as told by your doctor.  Contact a doctor if:  You think you are having a reaction to the medicine you are taking.  You have repeat headaches or feel dizzy.  You have puffiness (swelling) in your ankles.  You have trouble with your vision.  Get help right away if:  You get a very bad headache and are confused.  You feel weak, numb, or  faint.  You get chest or belly (abdominal) pain.  You throw up (vomit).  You cannot breathe very well.  This information is not intended to replace advice given to you by your health care provider. Make sure you discuss any questions you have with your health care provider.  Document Released: 06/05/2009 Document Revised: 05/25/2017 Document Reviewed: 10/10/2014  [employer] Interactive Patient Education © 2017 [employer]    Depression / Suicide Risk    As you are discharged from this [facility] facility, it is important to learn how to keep safe from harming yourself.    Recognize the warning signs:  · Abrupt changes in personality, positive or negative- including increase in energy   · Giving away possessions  · Change in eating patterns- significant weight changes-  positive or negative  · Change in sleeping patterns- unable to sleep or sleeping all the time   · Unwillingness or inability to communicate  · Depression  · Unusual sadness, discouragement and loneliness  · Talk of wanting to die  · Neglect of personal appearance   · Rebelliousness- reckless behavior  · Withdrawal from people/activities they love  · Confusion- inability to concentrate     If you or a loved one observes any of these behaviors or has concerns about self-harm, here's what you can do:  · Talk about it- your feelings and reasons for harming yourself  · Remove any means that you might use to hurt yourself (examples: pills, rope, extension cords, firearm)  · Get professional help from the community (Mental Health, Substance Abuse, psychological counseling)  · Do not be alone:Call your Safe Contact- someone whom you trust who will be there for you.  · Call your local CRISIS HOTLINE 301-8823 or 941-706-9197  · Call your local Children's Mobile Crisis Response Team [place] (472) 313-8895 or www.[address]  · Call the toll free National Suicide Prevention Hotlines   · National Suicide Prevention Lifeline 214-936-AVED  (1313)  · National Hope Line Network 800-SUICIDE (036-7238)      Depression / Suicide Risk    As [facility] are discharged [facility] this [facility], it is important to learn how to keep safe from harming yourself.    Recognize the warning signs:  · Abrupt changes in personality, positive or negative- including increase in energy   · Giving away possessions  · Change in eating patterns- significant weight changes-  positive or negative  · Change in sleeping patterns- unable to sleep or sleeping all the time   · Unwillingness or inability to communicate  · Depression  · Unusual sadness, discouragement and loneliness  · Talk of wanting to die  · Neglect of personal appearance   · Rebelliousness- reckless behavior  · Withdrawal from people/activities they love  · Confusion- inability to concentrate     If you or a loved one observes any of these behaviors or has concerns about self-harm, here's what you can do:  · Talk about it- your feelings and reasons for harming yourself  · Remove any means that you might use to hurt yourself (examples: pills, rope, extension cords, firearm)  · Get professional help from the community (Mental Health, Substance Abuse, psychological counseling)  · Do not be alone:Call your Safe Contact- someone whom you trust who will be there for you.  · Call your local CRISIS HOTLINE 768-1068 or 996-928-1647  · Call your local Children's Mobile Crisis Response Team [place] (789) 310-8717 or www.[employer]  · Call the toll free National Suicide Prevention Hotlines   · National Suicide Prevention Lifeline 428-186-DOLD (7026)  · National Hope Line Network 800-SUICIDE (225-2685)

## 2018-05-06 NOTE — DISCHARGE PLANNING
Anticipated Discharge Disposition: SNF in [place], [place] vs Home with hospice    Action: SW completed choice form with verbal consent for [facility] for pt's home in [facility].     Barriers to Discharge: None    Plan: Unit SW to follow up with spouse about final d/c plan. SNF in [place], [place] vs Home with hospice

## 2018-05-06 NOTE — PROGRESS NOTES
Discussed hospice with wife, Dr Chandler, Alicia RN, and social work.  Wife would like to take the patient home on hospice tomorrow.  Team discussed the process to discharging home on hospice with wife and what that will look like when she gets home.   Wife states she would like the cortrak and all medical care to continue up until the discharge home.  She was assured multiple times that the team will do our best to make this transfer happen as smoothly and quickly as possible and that the goal is to discharge home tomorrow.

## 2018-05-06 NOTE — DISCHARGE PLANNING
Per [facility] request, spoke with Nurse Lia Feng at [facility], advised they have patients medications, and tube feeds, but will not accept patient in restraints of any kind as they are a restraint free facility. Per [facility], patient family has spoke with facility and advised they will accept patient.  Per Nurse Feng she will speak with there  to confirm if they will accept restraints.  Spoke with Supervisor Fabi, she is aware of current barriers.

## 2018-05-06 NOTE — PROGRESS NOTES
Nephrology Hospital Progress Note               Author: Norma Hager Date & Time created: 2018  1:02 PM     Interval History:  77 y/o male admitted with slurred speech, negative MRI, complicated with CAR  Required temporary dialysis -recovered  Creatinine improving  Good UOP  Still confused  Plan to d/c to SNF in [place]  Review of Systems:  Review of Systems   Reason unable to perform ROS: confused.       Physical Exam:  Physical Exam   Constitutional: He appears well-developed and well-nourished. No distress.   HENT:   Head: Normocephalic and atraumatic.   Mouth/Throat: Oropharynx is clear and moist.   Eyes: Conjunctivae are normal. Pupils are equal, round, and reactive to light.   Neck: No thyromegaly present.   Cardiovascular: Normal rate and regular rhythm.  Exam reveals no gallop and no friction rub.    Pulmonary/Chest: Effort normal and breath sounds normal. No respiratory distress. He has no wheezes. He has no rales.   Abdominal: Soft. Bowel sounds are normal. He exhibits no distension. There is no tenderness.   Musculoskeletal: He exhibits no edema.   Lymphadenopathy:     He has no cervical adenopathy.   Neurological:   confused   Nursing note and vitals reviewed.      Labs:        Invalid input(s): [id number]      Recent Labs      18   0326  18   1232   SODIUM  150*  151*  143   POTASSIUM  3.8  3.5*  3.3*   CHLORIDE  110  112  104   CO2  31  31  33   BUN  38*  33*  26*   CREATININE  2.03*  1.83*  1.76*   CALCIUM  9.1  8.7  8.4*     Recent Labs      18   0326  189  18   1232   GLUCOSE  125*  110*  235*     Recent Labs      18   RBC  2.16*   HEMOGLOBIN  8.3*   HEMATOCRIT  23.5*   PLATELETCT  343     Recent Labs      18   WBC  4.3*     Hemodynamics:  Temp (24hrs), Av.6 °C (97.8 °F), Min:36.4 °C (97.6 °F), Max:36.7 °C (98 °F)  Temperature: 36.7 °C (98 °F)  Pulse  Av.5  Min: 37  Max: 104   Blood Pressure : 150/84     Respiratory:    Respiration: 18, Pulse Oximetry: 98 %     Work Of Breathing / Effort: Mild  RUL Breath Sounds: Rhonchi, RML Breath Sounds: Diminished, RLL Breath Sounds: Diminished, CATALINA Breath Sounds: Rhonchi, LLL Breath Sounds: Diminished  Fluids:    Intake/Output Summary (Last 24 hours) at 05/03/18 1250  Last data filed at 05/03/18 1200   Gross per 24 hour   Intake              810 ml   Output             1600 ml   Net             -790 ml        GI/Nutrition:  Orders Placed This Encounter   Procedures   • DIET NPO     Standing Status:   Standing     Number of Occurrences:   1     Order Specific Question:   Restrict to:     Answer:   Strict [1]   • DISCONTINUE DIET TRAY     Standing Status:   Standing     Number of Occurrences:   1     Medical Decision Making, by Problem:  Active Hospital Problems    Diagnosis   • *Acute encephalopathy due to seizures, delirium [G93.40]   • Seizure (HCC) [R56.9]   • Shock circulatory (HCC) [R57.9]   • Hypernatremia [E87.0]   • Ileus ([facility]) [K56.7]   • Metabolic acidosis [E87.2]   • Dysphagia [R13.10]   • NSVT (nonsustained ventricular tachycardia) ([facility]) [I47.2]   • Acute respiratory failure with hypoxia, due to encephalopathy, possible pneumonia ([facility]) [J96.00]   • Anemia [D64.9]   • Acute renal failure superimposed on stage 3 chronic kidney disease (HCC) [N17.9, N18.3]   • Cerebral ataxia ([facility]) [G11.9]   • Diabetes mellitus type 2, controlled (CMS-HCC) [E11.9]   • Essential hypertension [I10]   • Dyslipidemia [E78.5]       Core MeasuresAssessment and Plan    1.CRA -improving -off HD  2.HTN: BP well controlled  3.Electrolytes: hypernatremia normalized -d/c D5W  4.Anemia: Hb to monitor  5.Volume: well controlled  6.AMS    Recs: monitor BMP, keep well hydrated

## 2018-05-07 NOTE — CARE PLAN
Problem: Urinary Elimination:  Goal: Ability to reestablish a normal urinary elimination pattern will improve  Outcome: PROGRESSING AS EXPECTED  Pt voids via mcarthur catheter. Mcarthur care performed with linen change. Urine yellow, clear.     Problem: Pain Management  Goal: Pain level will decrease to patient's comfort goal  Outcome: PROGRESSING AS EXPECTED  Pt medicated with tylenol per MAR to tx shoulder pain. Pt denies pain at this time, is resting comfortably with son at bedside.

## 2018-05-07 NOTE — PROGRESS NOTES
Discussed options with son at bedside related to patient pulling out his cortrak and the [place] facility being unable to place another one.  He requested that I call the wife POA and have the same discussion.  Called Yael - wife and had a discussion regarding peg tube placement vs cortrak and the alternative option of hospice.  She stated that whatever would be quicker is what she wanted.  We discussed that this is a very important decision and I reenforced that making it based on the speediness it could happen was maybe not appropriate.  I encouraged her to take the time to discuss this with her family and get back to us on the decision.  I informed her that no matter the decision our team would make every attempt to follow her wished in a speedy manner.  After much deliberation she stated she would like the peg tube placed.  Dr Chandler informed.

## 2018-05-07 NOTE — PROGRESS NOTES
Late entry:  0500: pt son ran downstairs, pt was unsupervised for approx 15 minutes. During this time, pt dislodged coretrack approximately 12 inches, this nurse  Stopped feed and removed coretrack tube the remainder of the way. Pt lung sounds display no changes from assessment earlier in the shift.

## 2018-05-07 NOTE — PROGRESS NOTES
Patient has been unarousable to painful stimuli since about 1330 today.  Notified MD, joao vitals, nothing outside of normal besides O2 saturation.  Placed call to RT for deep suctioning.  Will continue to monitor

## 2018-05-07 NOTE — PROGRESS NOTES
Patient remains alert to self but does occasionally respond to questions. Holland remains in place with continuous tube feedings. Turned and repositioned every 2 hours. Suctioned as needed. Patient is to be discharged tomorrow with home hospice.     Skin assessment: Slight redness behind left ear from oxygen tubing, protective foam applied and tubing readjusted throughout the day. Scattered bruising to BUE. Buttocks red and blanchable. BLE has multiple scattered scabs. Bilateral heels elevated on pillows, turned and repositioned every 2 hours and PRN.       Bed and strip alarm active and audible, call bell within reach, hourly rounding performed.

## 2018-05-07 NOTE — DISCHARGE PLANNING
Medical Social Work  Faxed DME order to [facility].  Requested [facility] to find out about transport to [place] today.      Talked to patient's son and patient's wife via phone. Yael stated that she wants her  to go to [facility] as he is on O2 and wants them to wean him off of it.  Her hope is that once his home he will get better.  Stated several times that she will do what ever is necessary to get home to [place].   Told them that we will contact  [facility] to see if they can take patient today and working on transportation for tomorrow.

## 2018-05-07 NOTE — CARE PLAN
Problem: Safety  Goal: Will remain free from injury  Outcome: PROGRESSING AS EXPECTED  Patient remains free from falls. Bed and strip alarm active and audible. Hourly rounding performed    Problem: Discharge Barriers/Planning  Goal: Patient's continuum of care needs will be met  Outcome: PROGRESSING SLOWER THAN EXPECTED  Please see progress notes. Patient to be discharge home with hospice services tomorrow    Problem: Urinary Elimination:  Goal: Ability to reestablish a normal urinary elimination pattern will improve  Outcome: PROGRESSING AS EXPECTED  Patient continues to have mcarthur catheter, remains patent

## 2018-05-07 NOTE — DISCHARGE PLANNING
LM for a call back from [facility] regarding information if they can put coretrack back in if pt removes it again.

## 2018-05-07 NOTE — PROGRESS NOTES
Assumed care of pt from Nirmal Maldonado. Pt has no complaints of pain/discomfort at this time; pt appears moderately calm. Hourly rounding in place. Call light within reach, treaded slipper socks on, bed locked in lowest position. Mobility and fall risk assessed- proper communication signs are in place.  No family at bedside at this time. Built in bed alarm in ON, strip alarm is ON

## 2018-05-07 NOTE — THERAPY
Attempted to see the patient for dysphagia therapy on this date.  He pulled his Cortrak and is currently NPO with no source of nutrition.  Upon entering the room, patient sleeping with noisy, congested sounding respirations and on oxymask.  The patient was somnolent and did not rouse to verbal stimuli or sternal rub. SLP will hold today and reattempt as the patient is appropriate.

## 2018-05-07 NOTE — PROGRESS NOTES
[facility] Hospitalist Progress Note    Date of Service: 2018    Chief Complaint  77 y.o. male admitted 2018 with altered mentation. Due to agitation and hypoxia, he was intubated in ICU and extubated on . He remained confused and put on restraint. He is on tube feeding but due to ileus, tube feeding was held.  EEG: seizure activity, started on keppra. Neuro on: MRI negative.  In ICU, he developed CAR and required brief period of HD. nephro on  He was transferred to medical floor on 5/3.    Interval Problem Update  The son was very apologitic for his behavior yesterday. SW working on discharge.   Discussed with the mom(POA) about his feeding and she agreed with PEG tube placement.  Dr. Storey was consulted for PEG.    Consultants/Specialty  Nephrology  Neurology  Cardiology  Palliative care    Disposition  Patient requires additional treatment hospital, will need placement at the time of discharge        Review of Systems   Unable to perform ROS: Mental acuity        Unable to obtain due to delirium/confusion.        Physical Exam  Laboratory/Imaging   Hemodynamics  Temp (24hrs), Av.5 °C (97.7 °F), Min:36.1 °C (96.9 °F), Max:37 °C (98.6 °F)   Temperature: 37 °C (98.6 °F)  Pulse  Av.6  Min: 37  Max: 104    Blood Pressure : 146/69     Respiratory      Respiration: 18, Pulse Oximetry: 89 %     Work Of Breathing / Effort: Mild  RUL Breath Sounds: Diminished, RML Breath Sounds: Diminished, RLL Breath Sounds: Diminished;Crackles, CATALINA Breath Sounds: Rhonchi, LLL Breath Sounds: Diminished;Crackles    Fluids    Intake/Output Summary (Last 24 hours) at 18 0800  Last data filed at 18 0600   Gross per 24 hour   Intake             1420 ml   Output              750 ml   Net              670 ml       Nutrition  Orders Placed This Encounter   Procedures   • DIET NPO     Standing Status:   Standing     Number of Occurrences:   1     Order Specific Question:   Restrict to:     Answer:   Strict [1]   •  DISCONTINUE DIET TRAY     Standing Status:   Standing     Number of Occurrences:   1     Physical Exam   Constitutional: He appears ill.   HENT:   Head: Normocephalic and atraumatic.   Eyes: Right eye exhibits no discharge.   Neck: Neck supple. No thyromegaly present.   Cardiovascular: Normal rate.  Exam reveals distant heart sounds.    Pulmonary/Chest: Effort normal.   Abdominal: Soft. He exhibits no distension.   NG tube   Musculoskeletal: He exhibits no edema.   Skin: Skin is warm. He is not diaphoretic.   On restraint   Psychiatric: He is noncommunicative.   Vitals reviewed.      Recent Labs      05/05/18   0319   WBC  4.3*   RBC  2.16*   HEMOGLOBIN  8.3*   HEMATOCRIT  23.5*   MCV  108.8*   MCH  38.4*   MCHC  35.3   RDW  60.3*   PLATELETCT  343   MPV  9.5     Recent Labs      05/05/18   0319  05/06/18   1232  05/07/18   0319   SODIUM  151*  143  140   POTASSIUM  3.5*  3.3*  3.6   CHLORIDE  112  104  101   CO2  31  33  33   GLUCOSE  110*  235*  189*   BUN  33*  26*  28*   CREATININE  1.83*  1.76*  1.74*   CALCIUM  8.7  8.4*  8.5                      Assessment/Plan     * Acute encephalopathy due to seizures, delirium- (present on admission)   Assessment & Plan    - likely multifactorial  - Persists, unknown cause at this time  - Patient did have a EEG concerning for seizures, now placed on Keppra, remains altered, could possibly be due to Keppra?  - Patient also had uremia, this is improved with hemodialysis but his mentation is still off  - Could've initially been due to sepsis however this is resolved  - Discuss with family at bedside, went to see how he progresses, considering hospice  - will hold haloperidol, benzos  - wife wants to continue his care in [place], [place]          Seizure (HCC)- (present on admission)   Assessment & Plan    -Continue Keppra  - No additional seizure-like activity        Shock circulatory (HCC)- (present on admission)   Assessment & Plan     - Resolved        Hypernatremia   Assessment &  Plan    - improving  - Na 140  - free water flush  - Repeat BMP in the morning        NSVT (nonsustained ventricular tachycardia) ([facility])   Assessment & Plan    - Monitor with telemetry  - Keep electrolytes normal        Dysphagia   Assessment & Plan    - Patient did pass swallowing evaluation with nectar thick liquids  - Patient also was on tube feeds, was tolerating mental overnight, concern for aspiration  - Patient has had tube feed-like substance suctioned with NT suctioning  - having BM  - restarted TB at 35cc/hr and slowly advance as tolerated, but he removed it yesterday  - monitor closely for aspiration  - GI consulted for PEG        Metabolic acidosis- (present on admission)   Assessment & Plan    - resolved        Ileus ([facility])   Assessment & Plan    - improving  - restarted on tube feeding  - but he took out NGT          Acute renal failure superimposed on stage 3 chronic kidney disease ([facility])- (present on admission)   Assessment & Plan    - Has required hemodialysis, now holding hemodialysis per nephrology  - Cr: improving slowly  - follow bmp closely        Anemia- (present on admission)   Assessment & Plan    - No sign of gross bleeding  - Repeat CBC in the morning  - Hemoglobin is stable  - Transfuse as needed        Acute respiratory failure with hypoxia, due to encephalopathy, possible pneumonia ([facility])- (present on admission)   Assessment & Plan    - Intubated 4/20, extubated 4/25  - Good sats on 3 L nasal cannula  - Additional recommendations for intensivist        Cerebral ataxia ([facility])- (present on admission)   Assessment & Plan    -Chronic, PT/OT when able        Diabetes mellitus type 2, controlled (CMS-[facility])- (present on admission)   Assessment & Plan    -Continue insulin sliding scale, adjust as needed        Dyslipidemia- (present on admission)   Assessment & Plan    -Takes gemfibrozil at home, continue        Essential hypertension- (present on admission)   Assessment & Plan    -Continue  amlodipine  - Continue when necessary medication and adjust as needed          Quality-Core Measures   Reviewed items::  Labs reviewed, Medications reviewed and Radiology images reviewed  Holland Catheter: discontinue Holland.  DVT prophylaxis pharmacological::  Heparin  Assessed for rehabilitation services:  Patient was assess for and/or received rehabilitation services during this hospitalization

## 2018-05-07 NOTE — PROGRESS NOTES
Patient removed Cortrak on previous shift, staff on unit unaware if SNF pt POA wanted to send him would take pt with Cortrak.  No nurses on the floor today are Cortrak certified to place, charge nurse called around to other units to see if a certified RN can come up yet no one has been available.  All medications have been held in the meantime.

## 2018-05-07 NOTE — DISCHARGE PLANNING
Disregard Referral notice sent to [facility]. Jaya spoke with the  agency earlier today. Pt to discharge to SNF in [place].

## 2018-05-08 NOTE — PROGRESS NOTES
Pt  at , pronounced by 2 RN's. Family at bedside, MD notified. Pt not accepted by . Tissue bank contacted- awaiting tissue bank to speak with family. Lines removed, body cleansed and in body bag. Belongings (one outfit) in a bag, bag is at charge desk and family will collect in AM.

## 2018-05-08 NOTE — PROGRESS NOTES
Had a very long discussion with the patient's wife (POA) over the phone regarding his medical status. I explained to her that he is having more lethargic and more SOB. She said he told her before that he doesn't want to be intubated again or go through any invasive procedure including blood test. She understood that he is not going to make it and doesn't want him to suffer anymore.  I explained to her in detail about comfort care measure for him at his last moment and answered all the questions she had.  After a long discussion, she agreed to put him on comfort care.  Comfort care ordered.

## 2018-05-08 NOTE — PROGRESS NOTES
Assumed care of pt from Nirmal Garcia. Pt has no complaints of pain/discomfort at this time, pt is non responsive to verbal stimuli at this time. Pt duaghter, son, and grandchildren at bedside. Family educated on bereavement trays and verbalize understanding.  Hourly rounding in place. Call light within reach, treaded slipper socks on, bed locked in lowest position. Mobility and fall risk assessed- proper communication signs are in place. Bed alarm is ON.

## 2018-05-08 NOTE — PROGRESS NOTES
Nephrology Hospital Progress Note               Author: Fadi Najjar Date & Time created: 2018  5:03 PM     Interval History:  75 y/o male admitted with slurred speech, negative MRI, complicated with CAR  Required temporary dialysis -recovered  Creatinine improving  Good UOP    Review of Systems:  Review of Systems   Unable to perform ROS: Mental acuity       Physical Exam:  Physical Exam   Constitutional: He appears lethargic. No distress. Face mask in place.   HENT:   Head: Atraumatic.   Mouth/Throat: Oropharynx is clear and moist.   Eyes: Conjunctivae are normal. Pupils are equal, round, and reactive to light. No scleral icterus.   Cardiovascular: Normal rate and regular rhythm.    Abdominal: Bowel sounds are normal.   Musculoskeletal: He exhibits no edema.   Neurological: He appears lethargic.   confused   Nursing note and vitals reviewed.      Labs:        Invalid input(s): [id number]      Recent Labs      18   SODIUM  151*  143  140   POTASSIUM  3.5*  3.3*  3.6   CHLORIDE  112  104  101   CO2  31  33  33   BUN  33*  26*  28*   CREATININE  1.83*  1.76*  1.74*   CALCIUM  8.7  8.4*  8.5     Recent Labs      18   ALTSGPT   --    --   35   ASTSGOT   --    --   23   ALKPHOSPHAT   --    --   201*   TBILIRUBIN   --    --   0.4   PREALBUMIN   --    --   12.0*   GLUCOSE  110*  235*  189*     Recent Labs      18   RBC  2.16*   HEMOGLOBIN  8.3*   HEMATOCRIT  23.5*   PLATELETCT  343     Recent Labs      18   WBC  4.3*   --    ASTSGOT   --   23   ALTSGPT   --   35   ALKPHOSPHAT   --   201*   TBILIRUBIN   --   0.4     Hemodynamics:  Temp (24hrs), Av.5 °C (97.7 °F), Min:36.1 °C (96.9 °F), Max:37 °C (98.6 °F)  Temperature: 37 °C (98.6 °F)  Pulse  Av.6  Min: 37  Max: 104   Blood Pressure : 146/69    Respiratory:    Respiration: (!) 22, Pulse Oximetry: 95 % (After suction, pt  74% before), O2 Daily Delivery Respiratory : OxyMask     Work Of Breathing / Effort: Mild  RUL Breath Sounds: Coarse Crackles, RML Breath Sounds: Coarse Crackles, RLL Breath Sounds: Coarse Crackles, CATALINA Breath Sounds: Coarse Crackles, LLL Breath Sounds: Coarse Crackles  Fluids:    Intake/Output Summary (Last 24 hours) at 05/03/18 1250  Last data filed at 05/03/18 1200   Gross per 24 hour   Intake              810 ml   Output             1600 ml   Net             -790 ml        GI/Nutrition:  Orders Placed This Encounter   Procedures   • DIET NPO     Standing Status:   Standing     Number of Occurrences:   1     Order Specific Question:   Restrict to:     Answer:   Strict [1]   • DISCONTINUE DIET TRAY     Standing Status:   Standing     Number of Occurrences:   1     Medical Decision Making, by Problem:  Active Hospital Problems    Diagnosis   • *Acute encephalopathy due to seizures, delirium [G93.40]   • Seizure ([facility]) [R56.9]   • Shock circulatory ([facility]) [R57.9]   • Hypernatremia [E87.0]   • Ileus ([facility]) [K56.7]   • Metabolic acidosis [E87.2]   • Dysphagia [R13.10]   • NSVT (nonsustained ventricular tachycardia) ([facility]) [I47.2]   • Acute respiratory failure with hypoxia, due to encephalopathy, possible pneumonia ([facility]) [J96.00]   • Anemia [D64.9]   • Acute renal failure superimposed on stage 3 chronic kidney disease ([facility]) [N17.9, N18.3]   • Cerebral ataxia ([facility]) [G11.9]   • Diabetes mellitus type 2, controlled (CMS-[facility]) [E11.9]   • Essential hypertension [I10]   • Dyslipidemia [E78.5]       Labs reviewed                  Assessment and Plan    1.CAR : cr is stable  2.HTN: BP well controlled  3.Electrolytes: OK  4.Anemia: Hb to monitor  5.Volume: well controlled  6.AMS    Recs:   monitor BMP  no need for HD  Renal dose all meds  Avoid nephrotoxins  Prognosis poor.    Quality-Core Measures   Reviewed items::  Labs reviewed

## 2018-05-10 NOTE — DISCHARGE SUMMARY
CHIEF COMPLAINT ON ADMISSION  Altered mental status    CODE STATUS  Prior    HPI & HOSPITAL COURSE  This is a 77 y.o. male here with altered mental status on 4/19/18.  Due to agitation and hypoxia, he was intubated in ICU.  He had a prolonged and complicated ICU stay where he had CAR. Nephrology was consulted and he received HD.  Also he developed seizure and EEG showed abnormal seizure activity. MRI brain was negative. Neurology was consulted   And he was put on keppra.   With HD his CAR seems to be improved and HD was hold. He was extubated on 4/25. After extubation, he remained confused  And put on restraint. He was put on NGT feeding.   He was then transferred to medical floor on 5/3. While on the floor, he remained to be on restraint and confused. He pulled out   NGT. While on the floor, I had multiple discussions with the wife and family members regarding his medical status. Please see my  Daily notes for details. They wanted to bring the patient back to [place] as soon as possible since they think that's where he wanted to be.  Due to his worsening respiratory distress and deteriorating medical condition, I informed the wife again over the phone and she said  He told her that he doesn't want to be intubated and she wants him to be comfortable. Comfort care was explained to her in details and she agreed to proceed with comfort care. Later that day, the patient passed.    DISCHARGE PROBLEM LIST  Principal Problem:    Acute encephalopathy due to seizures, delirium POA: Yes  Active Problems:    Shock circulatory (HCC) POA: Yes    Seizure (HCC) POA: Yes    Acute respiratory failure with hypoxia, due to encephalopathy, possible pneumonia ([facility]) POA: Yes    Anemia POA: Yes    Acute renal failure superimposed on stage 3 chronic kidney disease ([facility]) POA: Yes    Ileus ([facility]) POA: No    Metabolic acidosis POA: Yes    Dysphagia POA: No    NSVT (nonsustained ventricular tachycardia) ([facility]) POA: No    Hypernatremia POA: No     Essential hypertension POA: Yes    Dyslipidemia POA: Yes    Diabetes mellitus type 2, controlled (CMS-HCC) POA: Yes    Cerebral ataxia (HCC) POA: Yes  Resolved Problems:    Hypothermia POA: Yes    Bradycardia POA: Yes      Total time of the discharge process exceeds 45 minutes

## 2018-05-10 NOTE — DISCHARGE SUMMARY
Hospital Medicine Death Summary    Date of Note  5/10/2018    Primary Care Physician  Pcp Not In Computer    Death Date: 05/07/18  Death Time: 2150    Cause of Death  Acute hypoxic respiratory failure  Acute encephalopathy  Hypernatremia  Dysphagia  Ileus  Acute renal failure  Diabetes mellitus II    Comorbid Conditions  Patient Active Problem List   Diagnosis   • Spinal stenosis, lumbar region, without neurogenic claudication   • NSTEMI (non-ST elevated myocardial infarction) ([facility])   • Essential hypertension   • Dyslipidemia   • Osteoarthritis   • GERD (gastroesophageal reflux disease)   • Diabetes mellitus type 2, controlled ([facility])   • Acute respiratory failure with hypoxia, due to encephalopathy, possible pneumonia ([facility])   • Anemia   • Acute renal failure superimposed on stage 3 chronic kidney disease ([facility])   • Cerebral ataxia ([facility])   • Shock circulatory ([facility])   • Acute encephalopathy due to seizures, delirium   • Seizure ([facility])   • Ileus ([facility])   • Metabolic acidosis   • Dysphagia   • NSVT (nonsustained ventricular tachycardia) ([facility])   • Hypernatremia       History of Presenting Illness and Hospital Course  This is a 77 y.o. male here with altered mental status on 4/19/18.  Due to agitation and hypoxia, he was intubated in ICU.  He had a prolonged and complicated ICU stay where he had CAR. Nephrology was consulted and he received HD.  Also he developed seizure and EEG showed abnormal seizure activity. MRI brain was negative. Neurology was consulted   And he was put on keppra.   With HD his CAR seems to be improved and HD was hold. He was extubated on 4/25. After extubation, he remained confused  And put on restraint. He was put on NGT feeding.   He also noticed to have bradycardia, cardiology was consulted and said likely related to electrolytes derangement.  He was then transferred to medical floor on 5/3. While on the floor, he remained to be on restraint and confused. He pulled out   NGT. While on the floor,  I had multiple discussions with the wife and family members regarding his medical status. Please see my  Daily notes for details. They wanted to bring the patient back to [place] as soon as possible since they think that's where he wanted to be.  Due to his worsening respiratory distress and deteriorating medical condition, I informed the wife again over the phone and she said  He told her that he doesn't want to be intubated and she wants him to be comfortable. Comfort care was explained to her in details and she agreed to proceed with comfort care. Later that day, the patient passed around 2150 PM.    Consultants  Neurology  Nephrology  Intensivist  Palliative  cardiology  Procedures

## 2018-05-16 LAB
FUNGUS SPEC CULT: NORMAL
SIGNIFICANT IND 70042: NORMAL
SITE SITE: NORMAL
SOURCE SOURCE: NORMAL

## 2018-06-15 LAB
MYCOBACTERIUM SPEC CULT: NORMAL
RHODAMINE-AURAMINE STN SPEC: NORMAL
SIGNIFICANT IND 70042: NORMAL
SITE SITE: NORMAL
SOURCE SOURCE: NORMAL

## 2020-09-14 NOTE — DISCHARGE PLANNING
Medical SW    Referral: Sw advised by RN pt's wife requesting a lodging discount. Thad spoke to ASHLEY Monterroso and there is a new list of lodging choices. She will fax this new list to Sw.    Thad completed lodging discount letter for pt's wife. Sw will wait for lodging motel participator list then provide to wife.      Plan: Thad to assist w/ d/c planning as needed.     No

## 2021-12-03 NOTE — PROGRESS NOTES
3 hr HD was completed at 1820. Net UF 2 liters. VICENTA CDI. Positional. Line reversed for about 45 minutes. He tolerated HD with pressor for Bp support. titrated by primary RN.   Yes...